# Patient Record
Sex: MALE | Race: WHITE | NOT HISPANIC OR LATINO | Employment: OTHER | URBAN - METROPOLITAN AREA
[De-identification: names, ages, dates, MRNs, and addresses within clinical notes are randomized per-mention and may not be internally consistent; named-entity substitution may affect disease eponyms.]

---

## 2017-10-04 ENCOUNTER — ALLSCRIPTS OFFICE VISIT (OUTPATIENT)
Dept: OTHER | Facility: OTHER | Age: 67
End: 2017-10-04

## 2017-10-04 DIAGNOSIS — N40.0 ENLARGED PROSTATE WITHOUT LOWER URINARY TRACT SYMPTOMS (LUTS): ICD-10-CM

## 2017-10-04 LAB
BILIRUB UR QL STRIP: NEGATIVE
CLARITY UR: NORMAL
COLOR UR: YELLOW
GLUCOSE (HISTORICAL): NEGATIVE
HGB UR QL STRIP.AUTO: NEGATIVE
KETONES UR STRIP-MCNC: NEGATIVE MG/DL
LEUKOCYTE ESTERASE UR QL STRIP: NORMAL
NITRITE UR QL STRIP: NEGATIVE
PH UR STRIP.AUTO: 6 [PH]
PROT UR STRIP-MCNC: NORMAL MG/DL
SP GR UR STRIP.AUTO: 1.02
UROBILINOGEN UR QL STRIP.AUTO: 0.2

## 2018-01-13 VITALS
BODY MASS INDEX: 30.45 KG/M2 | HEIGHT: 67 IN | SYSTOLIC BLOOD PRESSURE: 144 MMHG | DIASTOLIC BLOOD PRESSURE: 82 MMHG | WEIGHT: 194 LBS

## 2018-10-10 ENCOUNTER — OFFICE VISIT (OUTPATIENT)
Dept: UROLOGY | Facility: MEDICAL CENTER | Age: 68
End: 2018-10-10
Payer: MEDICARE

## 2018-10-10 VITALS
HEIGHT: 64 IN | DIASTOLIC BLOOD PRESSURE: 84 MMHG | BODY MASS INDEX: 31.41 KG/M2 | SYSTOLIC BLOOD PRESSURE: 136 MMHG | WEIGHT: 184 LBS

## 2018-10-10 DIAGNOSIS — N40.1 BENIGN LOCALIZED PROSTATIC HYPERPLASIA WITH LOWER URINARY TRACT SYMPTOMS (LUTS): Primary | ICD-10-CM

## 2018-10-10 LAB
SL AMB  POCT GLUCOSE, UA: NEGATIVE
SL AMB LEUKOCYTE ESTERASE,UA: NEGATIVE
SL AMB POCT BILIRUBIN,UA: NEGATIVE
SL AMB POCT BLOOD,UA: NEGATIVE
SL AMB POCT CLARITY,UA: CLEAR
SL AMB POCT COLOR,UA: YELLOW
SL AMB POCT KETONES,UA: NEGATIVE
SL AMB POCT NITRITE,UA: NEGATIVE
SL AMB POCT PH,UA: 5.5
SL AMB POCT SPECIFIC GRAVITY,UA: 1.02
SL AMB POCT URINE PROTEIN: NORMAL
SL AMB POCT UROBILINOGEN: 0.2

## 2018-10-10 PROCEDURE — 81003 URINALYSIS AUTO W/O SCOPE: CPT | Performed by: UROLOGY

## 2018-10-10 PROCEDURE — 99214 OFFICE O/P EST MOD 30 MIN: CPT | Performed by: UROLOGY

## 2018-10-10 RX ORDER — SOTALOL HYDROCHLORIDE 120 MG/1
TABLET ORAL DAILY
COMMUNITY
Start: 2018-10-03 | End: 2019-12-20 | Stop reason: SDUPTHER

## 2018-10-10 RX ORDER — AMLODIPINE BESYLATE 10 MG/1
TABLET ORAL DAILY
COMMUNITY
Start: 2018-09-01

## 2018-10-10 RX ORDER — POTASSIUM CHLORIDE 20 MEQ/1
TABLET, EXTENDED RELEASE ORAL 2 TIMES DAILY
COMMUNITY
Start: 2018-09-22 | End: 2021-01-04 | Stop reason: ALTCHOICE

## 2018-10-10 RX ORDER — SIMVASTATIN 5 MG
TABLET ORAL DAILY
COMMUNITY
Start: 2018-09-26

## 2018-10-10 RX ORDER — GEMFIBROZIL 600 MG/1
TABLET, FILM COATED ORAL 2 TIMES DAILY
COMMUNITY
Start: 2018-09-22 | End: 2021-01-04 | Stop reason: ALTCHOICE

## 2018-10-10 RX ORDER — SERTRALINE HYDROCHLORIDE 100 MG/1
TABLET, FILM COATED ORAL DAILY
COMMUNITY
Start: 2018-09-24

## 2018-10-10 RX ORDER — RIVAROXABAN 20 MG/1
TABLET, FILM COATED ORAL DAILY
COMMUNITY
Start: 2018-08-09

## 2018-10-10 NOTE — ASSESSMENT & PLAN NOTE
The patient's urinary symptoms are now interfering with his activities of daily living  He will return in the near future for cystoscopy

## 2018-10-10 NOTE — PROGRESS NOTES
Assessment/Plan:    Benign localized prostatic hyperplasia with lower urinary tract symptoms (LUTS)    The patient's urinary symptoms are now interfering with his activities of daily living  He will return in the near future for cystoscopy  Diagnoses and all orders for this visit:    Benign localized prostatic hyperplasia with lower urinary tract symptoms (LUTS)  -     POCT urine dip auto non-scope    Other orders  -     XARELTO 20 MG tablet; daily  -     sotalol (BETAPACE) 120 mg tablet; daily  -     potassium chloride (K-DUR,KLOR-CON) 20 mEq tablet; 2 (two) times a day  -     azilsartan medoxomil (EDARBI) 40 MG tablet; Take 40 mg by mouth daily  -     amLODIPine (NORVASC) 10 mg tablet; daily  -     gemfibrozil (LOPID) 600 mg tablet; 2 (two) times a day  -     metFORMIN (GLUCOPHAGE) 500 mg tablet; Take by mouth daily  -     simvastatin (ZOCOR) 5 MG tablet; daily  -     sertraline (ZOLOFT) 100 mg tablet; daily          Subjective:      Patient ID: Chelo Huerta is a 79 y o  male  HPI    BPH:   He notes urinary frequency, urinary urgency  He denies other significant urinary symptoms  He denies gross hematuria, urinary tract infections or incontinence  He is taking neither medications nor supplements for his symptoms  Sx interfere with ADL's  I think we can make pt much better than he is  AUA SYMPTOM SCORE      Most Recent Value   AUA SYMPTOM SCORE   How often have you had a sensation of not emptying your bladder completely after you finished urinating? 0   How often have you had to urinate again less than two hours after you finished urinating? 5   How often have you found you stopped and started again several times when you urinate?  0   How often have you found it difficult to postpone urination? 3   How often have you had a weak urinary stream?  2   How often have you had to push or strain to begin urination?   0   How many times did you most typically get up to urinate from the time you went to bed at night until the time you got up in the morning? 2   Quality of Life: If you were to spend the rest of your life with your urinary condition just the way it is now, how would you feel about that?  3   AUA SYMPTOM SCORE  12        PSA in April 2018 was 0 72  The following portions of the patient's history were reviewed and updated as appropriate: allergies, current medications, past family history, past medical history, past social history, past surgical history and problem list     Review of Systems   Constitutional: Negative for activity change and fatigue  Respiratory: Negative for shortness of breath and wheezing  Cardiovascular: Negative for chest pain  The patient had a successful cardioversion in the past   He is taking Xarelto  Still in and out of a fib  Had ablation a few days ago  Hypertension  Gastrointestinal: Negative for abdominal pain  Endocrine:          Non-insulin-dependent diabetes  Genitourinary: Negative for difficulty urinating, dysuria, frequency, hematuria and urgency  Musculoskeletal: Negative for back pain and gait problem  Skin: Negative  Allergic/Immunologic: Negative  Neurological: Negative  Psychiatric/Behavioral: Negative  Objective:      /84 (BP Location: Left arm, Patient Position: Sitting, Cuff Size: Standard)   Ht 5' 4" (1 626 m)   Wt 83 5 kg (184 lb)   BMI 31 58 kg/m²          Physical Exam   Constitutional: He is oriented to person, place, and time  He appears well-developed and well-nourished  HENT:   Head: Normocephalic and atraumatic  Eyes: EOM are normal    Neck: Normal range of motion  Neck supple  Pulmonary/Chest: Effort normal    Genitourinary: Rectum normal    Genitourinary Comments: The prostate is 40 grams, firm, smooth and non-tender   Musculoskeletal: Normal range of motion  Neurological: He is alert and oriented to person, place, and time  Skin: Skin is warm and dry     Psychiatric: He has a normal mood and affect   His behavior is normal  Judgment and thought content normal

## 2018-10-10 NOTE — LETTER
October 10, 2018     Nazia Cloud MD  [id number] [place] [address]    Patient: Milind Bonilla   YOB: 1950   Date of Visit: 10/10/2018       Dear Dr Joshua Aquino:    Thank you for referring Christine Vang to me for evaluation  Below are my notes for this consultation  If you have questions, please do not hesitate to call me  I look forward to following your patient along with you  Sincerely,        Hernán De Guzman MD        CC: No Recipients  Hernán De Guzman MD  10/10/2018  9:56 AM  Sign at close encounter  Assessment/Plan:    Benign localized prostatic hyperplasia with lower urinary tract symptoms (LUTS)    The patient's urinary symptoms are now interfering with his activities of daily living  He will return in the near future for cystoscopy  Diagnoses and all orders for this visit:    Benign localized prostatic hyperplasia with lower urinary tract symptoms (LUTS)  -     POCT urine dip auto non-scope    Other orders  -     XARELTO 20 MG tablet; daily  -     sotalol (BETAPACE) 120 mg tablet; daily  -     potassium chloride (K-DUR,KLOR-CON) 20 mEq tablet; 2 (two) times a day  -     azilsartan medoxomil (EDARBI) 40 MG tablet; Take 40 mg by mouth daily  -     amLODIPine (NORVASC) 10 mg tablet; daily  -     gemfibrozil (LOPID) 600 mg tablet; 2 (two) times a day  -     metFORMIN (GLUCOPHAGE) 500 mg tablet; Take by mouth daily  -     simvastatin (ZOCOR) 5 MG tablet; daily  -     sertraline (ZOLOFT) 100 mg tablet; daily          Subjective:      Patient ID: Milind Bonilla is a 79 y o  male  HPI    BPH:   He notes urinary frequency, urinary urgency  He denies other significant urinary symptoms  He denies gross hematuria, urinary tract infections or incontinence  He is taking neither medications nor supplements for his symptoms  Sx interfere with ADL's  I think we can make pt much better than he is         AUA SYMPTOM SCORE      Most Recent Value   AUA SYMPTOM SCORE How often have you had a sensation of not emptying your bladder completely after you finished urinating? 0   How often have you had to urinate again less than two hours after you finished urinating? 5   How often have you found you stopped and started again several times when you urinate?  0   How often have you found it difficult to postpone urination? 3   How often have you had a weak urinary stream?  2   How often have you had to push or strain to begin urination? 0   How many times did you most typically get up to urinate from the time you went to bed at night until the time you got up in the morning? 2   Quality of Life: If you were to spend the rest of your life with your urinary condition just the way it is now, how would you feel about that?  3   AUA SYMPTOM SCORE  12        PSA in April 2018 was 0 72  The following portions of the patient's history were reviewed and updated as appropriate: allergies, current medications, past family history, past medical history, past social history, past surgical history and problem list     Review of Systems   Constitutional: Negative for activity change and fatigue  Respiratory: Negative for shortness of breath and wheezing  Cardiovascular: Negative for chest pain  The patient had a successful cardioversion in the past   He is taking Xarelto  Still in and out of a fib  Had ablation a few days ago  Hypertension  Gastrointestinal: Negative for abdominal pain  Endocrine:          Non-insulin-dependent diabetes  Genitourinary: Negative for difficulty urinating, dysuria, frequency, hematuria and urgency  Musculoskeletal: Negative for back pain and gait problem  Skin: Negative  Allergic/Immunologic: Negative  Neurological: Negative  Psychiatric/Behavioral: Negative            Objective:      /84 (BP Location: Left arm, Patient Position: Sitting, Cuff Size: Standard)   Ht 5' 4" (1 626 m)   Wt 83 5 kg (184 lb)   BMI 31 58 kg/m² Physical Exam   Constitutional: He is oriented to person, place, and time  He appears well-developed and well-nourished  HENT:   Head: Normocephalic and atraumatic  Eyes: EOM are normal    Neck: Normal range of motion  Neck supple  Pulmonary/Chest: Effort normal    Genitourinary: Rectum normal    Genitourinary Comments: The prostate is 40 grams, firm, smooth and non-tender   Musculoskeletal: Normal range of motion  Neurological: He is alert and oriented to person, place, and time  Skin: Skin is warm and dry  Psychiatric: He has a normal mood and affect   His behavior is normal  Judgment and thought content normal

## 2018-10-29 ENCOUNTER — PROCEDURE VISIT (OUTPATIENT)
Dept: UROLOGY | Facility: MEDICAL CENTER | Age: 68
End: 2018-10-29
Payer: MEDICARE

## 2018-10-29 VITALS
DIASTOLIC BLOOD PRESSURE: 84 MMHG | HEIGHT: 64 IN | SYSTOLIC BLOOD PRESSURE: 146 MMHG | BODY MASS INDEX: 31.41 KG/M2 | WEIGHT: 184 LBS

## 2018-10-29 DIAGNOSIS — N32.81 OVERACTIVE BLADDER: ICD-10-CM

## 2018-10-29 DIAGNOSIS — N13.8 BPH WITH URINARY OBSTRUCTION: ICD-10-CM

## 2018-10-29 DIAGNOSIS — N40.1 BPH WITH URINARY OBSTRUCTION: ICD-10-CM

## 2018-10-29 DIAGNOSIS — N40.1 BENIGN LOCALIZED PROSTATIC HYPERPLASIA WITH LOWER URINARY TRACT SYMPTOMS (LUTS): Primary | ICD-10-CM

## 2018-10-29 LAB
SL AMB  POCT GLUCOSE, UA: ABNORMAL
SL AMB LEUKOCYTE ESTERASE,UA: ABNORMAL
SL AMB POCT BILIRUBIN,UA: NEGATIVE
SL AMB POCT BLOOD,UA: ABNORMAL
SL AMB POCT CLARITY,UA: CLEAR
SL AMB POCT COLOR,UA: ABNORMAL
SL AMB POCT KETONES,UA: NEGATIVE
SL AMB POCT NITRITE,UA: NEGATIVE
SL AMB POCT PH,UA: 6
SL AMB POCT SPECIFIC GRAVITY,UA: 1.02
SL AMB POCT URINE PROTEIN: ABNORMAL
SL AMB POCT UROBILINOGEN: 0.2

## 2018-10-29 PROCEDURE — 99213 OFFICE O/P EST LOW 20 MIN: CPT | Performed by: UROLOGY

## 2018-10-29 PROCEDURE — 52000 CYSTOURETHROSCOPY: CPT | Performed by: UROLOGY

## 2018-10-29 PROCEDURE — 81003 URINALYSIS AUTO W/O SCOPE: CPT | Performed by: UROLOGY

## 2018-10-29 RX ORDER — TAMSULOSIN HYDROCHLORIDE 0.4 MG/1
0.4 CAPSULE ORAL EVERY EVENING
Qty: 30 CAPSULE | Refills: 1 | Status: SHIPPED | OUTPATIENT
Start: 2018-10-29 | End: 2018-11-28 | Stop reason: SDUPTHER

## 2018-10-29 RX ORDER — NITROFURANTOIN 25; 75 MG/1; MG/1
100 CAPSULE ORAL 2 TIMES DAILY
Qty: 6 CAPSULE | Refills: 0 | Status: SHIPPED | OUTPATIENT
Start: 2018-10-29 | End: 2021-01-04 | Stop reason: ALTCHOICE

## 2018-10-29 NOTE — ASSESSMENT & PLAN NOTE
The patient's urinary frequency and urgency certainly overlap with this diagnosis and the diagnosis of BPH  We will try alpha blockers and reassess the patient in a month

## 2018-10-29 NOTE — LETTER
October 29, 2018     Carlton Morales MD  [address] [facility]  [address] [address]    Patient: Harjit Saldaña   YOB: 1950   Date of Visit: 10/29/2018       Dear Dr Dinesh Bhatia:    Thank you for referring More Alcantara to me for evaluation  Below are my notes for this consultation  If you have questions, please do not hesitate to call me  I look forward to following your patient along with you  Sincerely,        Dimitri Buchanan MD        CC: No Recipients  Dimitri Buchanan MD  10/29/2018  8:49 AM  Sign at close encounter  Assessment/Plan:    Overactive bladder  The patient's urinary frequency and urgency certainly overlap with this diagnosis and the diagnosis of BPH  We will try alpha blockers and reassess the patient in a month  Benign localized prostatic hyperplasia with lower urinary tract symptoms (LUTS)    Trial of alpha blockers and reassess in 4 weeks  Diagnoses and all orders for this visit:    Benign localized prostatic hyperplasia with lower urinary tract symptoms (LUTS)  -     POCT urine dip auto non-scope  -     nitrofurantoin (MACROBID) 100 mg capsule; Take 1 capsule (100 mg total) by mouth 2 (two) times a day    BPH with urinary obstruction  -     tamsulosin (FLOMAX) 0 4 mg; Take 1 capsule (0 4 mg total) by mouth every evening    Overactive bladder          Subjective:      Patient ID: Harjit Saldaña is a 79 y o  male  HPI  BPH:   He notes urinary frequency, urinary urgency  He denies other significant urinary symptoms  He denies gross hematuria, urinary tract infections or incontinence  He is taking neither medications nor supplements for his symptoms       Sx interfere with ADL's  I think we can make pt much better than he is             AUA SYMPTOM SCORE      Most Recent Value   AUA SYMPTOM SCORE   How often have you had a sensation of not emptying your bladder completely after you finished urinating?   0   How often have you had to urinate again less than two hours after you finished urinating? 5   How often have you found you stopped and started again several times when you urinate?  0   How often have you found it difficult to postpone urination? 3   How often have you had a weak urinary stream?  2   How often have you had to push or strain to begin urination? 0   How many times did you most typically get up to urinate from the time you went to bed at night until the time you got up in the morning? 2   Quality of Life: If you were to spend the rest of your life with your urinary condition just the way it is now, how would you feel about that?  3   AUA SYMPTOM SCORE  12          PSA in April 2018 was 0 72  Overactive bladder:  The patient's urinary frequency and urgency certainly overlap with this diagnosis and the diagnosis of BPH  We will try alpha blockers and reassess the patient in a month  Procedures  MALE FLEXIBLE CYSTOSCOPY    Preoperative diagnosis:    BPH with obstruction    Postoperative diagnosis:     Same    Operation:  Flexible cystoscopy    Surgeon:  Teresa Espinoza MD,FACS    Anesthesia:  Xylocaine jelly    Procedure:  Patient was brought to the cystoscopy room and positively identified  The patient was prepped and draped in sterile fashion  Ten mL of 1% xylocaine jelly was instilled into the urethra  A penile clamp was applied  The flexible cystoscope was inserted  Findings are as follows:    Penile Urethra:normal  Bulbar Urethra:normal  Prostate:   Occlusive for approximately 1 5 cm by symmetrically enlarged lateral lobes  Bladder: The bladder neck is normal  Ureteral orifices are in normal  position  The mucosa is  normal    There is  mild trabeculation  The cystoscope was removed  The patient tolerated the procedure well  Following additional consultation to review the findings with the patient, he was discharged from the office in satisfactory condition  A small postvoid residual was noted      Impression:  Mild BPH which can be managed with medication if the patient wishes  Alternatively, he may simply live with the symptoms as they are  The following portions of the patient's history were reviewed and updated as appropriate: allergies, current medications, past family history, past medical history, past social history, past surgical history and problem list     Review of Systems    Constitutional: Negative for activity change and fatigue  Respiratory: Negative for shortness of breath and wheezing  Cardiovascular: Negative for chest pain  The patient had a successful cardioversion in the past   He is taking Xarelto  Still in and out of a fib  Had ablation a few days ago  Hypertension  Gastrointestinal: Negative for abdominal pain  Endocrine:          Non-insulin-dependent diabetes  Genitourinary: Negative for difficulty urinating, dysuria, frequency, hematuria and urgency  Musculoskeletal: Negative for back pain and gait problem  Skin: Negative  Allergic/Immunologic: Negative  Neurological: Negative  Psychiatric/Behavioral: Negative  Objective:      /84 (BP Location: Left arm, Patient Position: Sitting, Cuff Size: Standard)   Ht 5' 4" (1 626 m)   Wt 83 5 kg (184 lb)   BMI 31 58 kg/m²           Physical Exam   Constitutional: He is oriented to person, place, and time  He appears well-developed and well-nourished  HENT:   Head: Normocephalic and atraumatic  Eyes: EOM are normal    Neck: Normal range of motion  Pulmonary/Chest: Effort normal    Genitourinary: Penis normal    Genitourinary Comments: Normal external genitalia  Musculoskeletal: Normal range of motion  Neurological: He is alert and oriented to person, place, and time  Skin: Skin is warm and dry  Psychiatric: He has a normal mood and affect   His behavior is normal  Judgment and thought content normal

## 2018-10-29 NOTE — PROGRESS NOTES
Assessment/Plan:    Overactive bladder  The patient's urinary frequency and urgency certainly overlap with this diagnosis and the diagnosis of BPH  We will try alpha blockers and reassess the patient in a month  Benign localized prostatic hyperplasia with lower urinary tract symptoms (LUTS)    Trial of alpha blockers and reassess in 4 weeks  Diagnoses and all orders for this visit:    Benign localized prostatic hyperplasia with lower urinary tract symptoms (LUTS)  -     POCT urine dip auto non-scope  -     nitrofurantoin (MACROBID) 100 mg capsule; Take 1 capsule (100 mg total) by mouth 2 (two) times a day    BPH with urinary obstruction  -     tamsulosin (FLOMAX) 0 4 mg; Take 1 capsule (0 4 mg total) by mouth every evening    Overactive bladder          Subjective:      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 0   How often have you had to urinate again less than two hours after you finished urinating? 5   How often have you found you stopped and started again several times when you urinate?  0   How often have you found it difficult to postpone urination? 3   How often have you had a weak urinary stream?  2   How often have you had to push or strain to begin urination? 0   How many times did you most typically get up to urinate from the time you went to bed at night until the time you got up in the morning?   2   Quality of Life: If you were to spend the rest of your life with your urinary condition just the way it is now, how would you feel about that?  3   AUA SYMPTOM SCORE  12          PSA in April 2018 was 0 72  Overactive bladder:  The patient's urinary frequency and urgency certainly overlap with this diagnosis and the diagnosis of BPH  We will try alpha blockers and reassess the patient in a month  Procedures  MALE FLEXIBLE CYSTOSCOPY    Preoperative diagnosis:    BPH with obstruction    Postoperative diagnosis:     Same    Operation:  Flexible cystoscopy    Surgeon:  Dany Estrada MD,FACS    Anesthesia:  Xylocaine jelly    Procedure:  Patient was brought to the cystoscopy room and positively identified  The patient was prepped and draped in sterile fashion  Ten mL of 1% xylocaine jelly was instilled into the urethra  A penile clamp was applied  The flexible cystoscope was inserted  Findings are as follows:    Penile Urethra:normal  Bulbar Urethra:normal  Prostate:   Occlusive for approximately 1 5 cm by symmetrically enlarged lateral lobes  Bladder: The bladder neck is normal  Ureteral orifices are in normal  position  The mucosa is  normal    There is  mild trabeculation  The cystoscope was removed  The patient tolerated the procedure well  Following additional consultation to review the findings with the patient, he was discharged from the office in satisfactory condition  A small postvoid residual was noted  Impression:  Mild BPH which can be managed with medication if the patient wishes  Alternatively, he may simply live with the symptoms as they are  The following portions of the patient's history were reviewed and updated as appropriate: allergies, current medications, past family history, past medical history, past social history, past surgical history and problem list     Review of Systems    Constitutional: Negative for activity change and fatigue  Respiratory: Negative for shortness of breath and wheezing  Cardiovascular: Negative for chest pain  The patient had a successful cardioversion in the past   He is taking Xarelto  Still in and out of a fib  Had ablation a few days ago  Hypertension  Gastrointestinal: Negative for abdominal pain  Endocrine:          Non-insulin-dependent diabetes  Genitourinary: Negative for difficulty urinating, dysuria, frequency, hematuria and urgency  Musculoskeletal: Negative for back pain and gait problem  Skin: Negative  Allergic/Immunologic: Negative  Neurological: Negative  Psychiatric/Behavioral: Negative  Objective:      /84 (BP Location: Left arm, Patient Position: Sitting, Cuff Size: Standard)   Ht 5' 4" (1 626 m)   Wt 83 5 kg (184 lb)   BMI 31 58 kg/m²          Physical Exam   Constitutional: He is oriented to person, place, and time  He appears well-developed and well-nourished  HENT:   Head: Normocephalic and atraumatic  Eyes: EOM are normal    Neck: Normal range of motion  Pulmonary/Chest: Effort normal    Genitourinary: Penis normal    Genitourinary Comments: Normal external genitalia  Musculoskeletal: Normal range of motion  Neurological: He is alert and oriented to person, place, and time  Skin: Skin is warm and dry  Psychiatric: He has a normal mood and affect   His behavior is normal  Judgment and thought content normal

## 2018-11-28 ENCOUNTER — OFFICE VISIT (OUTPATIENT)
Dept: UROLOGY | Facility: MEDICAL CENTER | Age: 68
End: 2018-11-28
Payer: MEDICARE

## 2018-11-28 VITALS
BODY MASS INDEX: 29.57 KG/M2 | DIASTOLIC BLOOD PRESSURE: 82 MMHG | HEART RATE: 54 BPM | WEIGHT: 184 LBS | SYSTOLIC BLOOD PRESSURE: 142 MMHG | HEIGHT: 66 IN

## 2018-11-28 DIAGNOSIS — R35.0 URINARY FREQUENCY: ICD-10-CM

## 2018-11-28 DIAGNOSIS — N13.8 BPH WITH URINARY OBSTRUCTION: ICD-10-CM

## 2018-11-28 DIAGNOSIS — N40.1 BPH WITH URINARY OBSTRUCTION: ICD-10-CM

## 2018-11-28 DIAGNOSIS — N40.1 BENIGN LOCALIZED PROSTATIC HYPERPLASIA WITH LOWER URINARY TRACT SYMPTOMS (LUTS): Primary | ICD-10-CM

## 2018-11-28 PROBLEM — N32.81 OVERACTIVE BLADDER: Status: RESOLVED | Noted: 2018-10-29 | Resolved: 2018-11-28

## 2018-11-28 LAB
SL AMB  POCT GLUCOSE, UA: NEGATIVE
SL AMB LEUKOCYTE ESTERASE,UA: NEGATIVE
SL AMB POCT BILIRUBIN,UA: NEGATIVE
SL AMB POCT BLOOD,UA: NEGATIVE
SL AMB POCT CLARITY,UA: CLEAR
SL AMB POCT COLOR,UA: YELLOW
SL AMB POCT KETONES,UA: NEGATIVE
SL AMB POCT NITRITE,UA: NEGATIVE
SL AMB POCT PH,UA: 6
SL AMB POCT SPECIFIC GRAVITY,UA: >=1.03
SL AMB POCT URINE PROTEIN: NORMAL
SL AMB POCT UROBILINOGEN: 0.2

## 2018-11-28 PROCEDURE — 99213 OFFICE O/P EST LOW 20 MIN: CPT | Performed by: UROLOGY

## 2018-11-28 PROCEDURE — 81003 URINALYSIS AUTO W/O SCOPE: CPT | Performed by: UROLOGY

## 2018-11-28 RX ORDER — TAMSULOSIN HYDROCHLORIDE 0.4 MG/1
0.4 CAPSULE ORAL EVERY EVENING
Qty: 30 CAPSULE | Refills: 11 | Status: SHIPPED | OUTPATIENT
Start: 2018-11-28 | End: 2019-12-20

## 2018-11-28 RX ORDER — RISPERIDONE 0.5 MG/1
TABLET, FILM COATED ORAL DAILY
COMMUNITY
Start: 2018-11-13 | End: 2021-01-04 | Stop reason: ALTCHOICE

## 2018-11-28 NOTE — LETTER
November 28, 2018     Radha Hill MD  [address] [id number] [address] [address]    Patient: Sherman Ricketts   YOB: 1950   Date of Visit: 11/28/2018       Dear Dr Oswald Ny:    Thank you for referring Urszula Correa to me for evaluation  Below are my notes for this consultation  If you have questions, please do not hesitate to call me  I look forward to following your patient along with you  Sincerely,        Dana Ramirez MD        CC: No Recipients  Dana Ramirez MD  11/28/2018 11:03 AM  Sign at close encounter  Assessment/Plan:    Benign localized prostatic hyperplasia with lower urinary tract symptoms (LUTS)    The patient is doing very well on tamsulosin  We will continue it and see him again in 1 year  Diagnoses and all orders for this visit:    Benign localized prostatic hyperplasia with lower urinary tract symptoms (LUTS)    Urinary frequency  -     POCT urine dip auto non-scope    BPH with urinary obstruction  -     tamsulosin (FLOMAX) 0 4 mg; Take 1 capsule (0 4 mg total) by mouth every evening    Other orders  -     risperiDONE (RisperDAL) 0 5 mg tablet; daily          Subjective:      Patient ID: Sherman Ricketts is a 79 y o  male  HPI  BPH:   The patient was seen approximately 4 weeks ago with urinary symptoms severe enough to interfere with his activities of daily living  He was started on nitrofurantoin and tamsulosin  AUA score significantly improved  He notes nocturia x 1, weak stream   He denies other significant urinary symptoms  He denies gross hematuria, urinary tract infections or incontinence  He is taking tamsulosin for his symptoms  Feel tamsulosin turned his life around  No side effects  AUA SYMPTOM SCORE      Most Recent Value   AUA SYMPTOM SCORE   How often have you had a sensation of not emptying your bladder completely after you finished urinating?   0   How often have you had to urinate again less than two hours after you finished urinating? 2   How often have you found you stopped and started again several times when you urinate?  0   How often have you found it difficult to postpone urination? 2   How often have you had a weak urinary stream?  2   How often have you had to push or strain to begin urination? 0   How many times did you most typically get up to urinate from the time you went to bed at night until the time you got up in the morning? 1   Quality of Life: If you were to spend the rest of your life with your urinary condition just the way it is now, how would you feel about that?  2   AUA SYMPTOM SCORE  7          The following portions of the patient's history were reviewed and updated as appropriate: allergies, current medications, past family history, past medical history, past social history, past surgical history and problem list     Review of Systems    Constitutional: Negative for activity change and fatigue  Respiratory: Negative for shortness of breath and wheezing  Cardiovascular: Negative for chest pain  The patient had a successful cardioversion in the past   He is taking Xarelto  Still in and out of a fib  Had ablation a few days ago  Hypertension  Gastrointestinal: Negative for abdominal pain  Endocrine:          Non-insulin-dependent diabetes  Genitourinary: Negative for difficulty urinating, dysuria, frequency, hematuria and urgency  Musculoskeletal: Negative for back pain and gait problem  Skin: Negative  Allergic/Immunologic: Negative  Neurological: Negative  Psychiatric/Behavioral: Negative  Objective:      /82 (BP Location: Left arm, Patient Position: Sitting, Cuff Size: Standard)   Pulse (!) 54   Ht 5' 6" (1 676 m)   Wt 83 5 kg (184 lb)   BMI 29 70 kg/m²           Physical Exam   Constitutional: He is oriented to person, place, and time  He appears well-developed and well-nourished  HENT:   Head: Normocephalic and atraumatic     Eyes: EOM are normal  Neck: Normal range of motion  Pulmonary/Chest: Effort normal    Musculoskeletal: Normal range of motion  Neurological: He is alert and oriented to person, place, and time  Skin: Skin is warm and dry  Psychiatric: He has a normal mood and affect   His behavior is normal  Judgment and thought content normal

## 2018-11-28 NOTE — PROGRESS NOTES
Assessment/Plan:    Benign localized prostatic hyperplasia with lower urinary tract symptoms (LUTS)    The patient is doing very well on tamsulosin  We will continue it and see him again in 1 year  Diagnoses and all orders for this visit:    Benign localized prostatic hyperplasia with lower urinary tract symptoms (LUTS)    Urinary frequency  -     POCT urine dip auto non-scope    BPH with urinary obstruction  -     tamsulosin (FLOMAX) 0 4 mg; Take 1 capsule (0 4 mg total) by mouth every evening    Other orders  -     risperiDONE (RisperDAL) 0 5 mg tablet; daily          Subjective:      Patient ID: Miguel Kilgore is a 79 y o  male  HPI  BPH:   The patient was seen approximately 4 weeks ago with urinary symptoms severe enough to interfere with his activities of daily living  He was started on nitrofurantoin and tamsulosin  AUA score significantly improved  He notes nocturia x 1, weak stream   He denies other significant urinary symptoms  He denies gross hematuria, urinary tract infections or incontinence  He is taking tamsulosin for his symptoms  Feel tamsulosin turned his life around  No side effects  AUA SYMPTOM SCORE      Most Recent Value   AUA SYMPTOM SCORE   How often have you had a sensation of not emptying your bladder completely after you finished urinating? 0   How often have you had to urinate again less than two hours after you finished urinating? 2   How often have you found you stopped and started again several times when you urinate?  0   How often have you found it difficult to postpone urination? 2   How often have you had a weak urinary stream?  2   How often have you had to push or strain to begin urination? 0   How many times did you most typically get up to urinate from the time you went to bed at night until the time you got up in the morning?   1   Quality of Life: If you were to spend the rest of your life with your urinary condition just the way it is now, how would you feel about that?  2   AUA SYMPTOM SCORE  7          The following portions of the patient's history were reviewed and updated as appropriate: allergies, current medications, past family history, past medical history, past social history, past surgical history and problem list     Review of Systems    Constitutional: Negative for activity change and fatigue  Respiratory: Negative for shortness of breath and wheezing  Cardiovascular: Negative for chest pain  The patient had a successful cardioversion in the past   He is taking Xarelto  Still in and out of a fib  Had ablation a few days ago  Hypertension  Gastrointestinal: Negative for abdominal pain  Endocrine:          Non-insulin-dependent diabetes  Genitourinary: Negative for difficulty urinating, dysuria, frequency, hematuria and urgency  Musculoskeletal: Negative for back pain and gait problem  Skin: Negative  Allergic/Immunologic: Negative  Neurological: Negative  Psychiatric/Behavioral: Negative  Objective:      /82 (BP Location: Left arm, Patient Position: Sitting, Cuff Size: Standard)   Pulse (!) 54   Ht 5' 6" (1 676 m)   Wt 83 5 kg (184 lb)   BMI 29 70 kg/m²          Physical Exam   Constitutional: He is oriented to person, place, and time  He appears well-developed and well-nourished  HENT:   Head: Normocephalic and atraumatic  Eyes: EOM are normal    Neck: Normal range of motion  Pulmonary/Chest: Effort normal    Musculoskeletal: Normal range of motion  Neurological: He is alert and oriented to person, place, and time  Skin: Skin is warm and dry  Psychiatric: He has a normal mood and affect   His behavior is normal  Judgment and thought content normal

## 2019-02-08 ENCOUNTER — OFFICE VISIT (OUTPATIENT)
Dept: UROLOGY | Facility: MEDICAL CENTER | Age: 69
End: 2019-02-08
Payer: MEDICARE

## 2019-02-08 VITALS
BODY MASS INDEX: 29.89 KG/M2 | WEIGHT: 186 LBS | SYSTOLIC BLOOD PRESSURE: 140 MMHG | DIASTOLIC BLOOD PRESSURE: 80 MMHG | HEIGHT: 66 IN | HEART RATE: 60 BPM

## 2019-02-08 DIAGNOSIS — R35.0 FREQUENCY OF URINATION: Primary | ICD-10-CM

## 2019-02-08 DIAGNOSIS — N40.1 BPH WITH OBSTRUCTION/LOWER URINARY TRACT SYMPTOMS: ICD-10-CM

## 2019-02-08 DIAGNOSIS — N13.8 BPH WITH OBSTRUCTION/LOWER URINARY TRACT SYMPTOMS: ICD-10-CM

## 2019-02-08 DIAGNOSIS — N32.81 OVERACTIVE BLADDER: ICD-10-CM

## 2019-02-08 LAB — POST-VOID RESIDUAL VOLUME, ML POC: 89 ML

## 2019-02-08 PROCEDURE — 81003 URINALYSIS AUTO W/O SCOPE: CPT | Performed by: UROLOGY

## 2019-02-08 PROCEDURE — 99213 OFFICE O/P EST LOW 20 MIN: CPT | Performed by: UROLOGY

## 2019-02-08 PROCEDURE — 51798 US URINE CAPACITY MEASURE: CPT | Performed by: UROLOGY

## 2019-02-08 RX ORDER — OXYBUTYNIN CHLORIDE 5 MG/1
5 TABLET ORAL 3 TIMES DAILY
Qty: 90 TABLET | Refills: 1 | Status: SHIPPED | OUTPATIENT
Start: 2019-02-08 | End: 2019-12-20

## 2019-02-08 NOTE — LETTER
February 8, 2019     Radha Hill MD  [address] [address]    Patient: Sherman Ricketts   YOB: 1950   Date of Visit: 2/8/2019       Dear Dr Oswald Ny:    Thank you for referring Urszula Correa to me for evaluation  Below are my notes for this consultation  If you have questions, please do not hesitate to call me  I look forward to following your patient along with you  Sincerely,        Dana Ramirez MD        CC: No Recipients  Dana Ramirez MD  2/8/2019  9:20 AM  Sign at close encounter  Assessment/Plan:    Overactive bladder  After short term success with tamsulosin for treatment of the patient's BPH symptoms, he now presents with frequency, urgency and voiding small amounts  The postvoid residual today of 89 mL is actually a 1 hour postvoid residual   The symptoms now are compatible with overactive bladder  He will be given a trial of oxybutynin in addition to the tamsulosin  He will return in 1 month cirilo [place] Diagnoses and all orders for this visit:    Frequency of urination  -     POCT Measure PVR  -     oxybutynin (DITROPAN) 5 mg tablet; Take 1 tablet (5 mg total) by mouth 3 (three) times a day    Overactive bladder    BPH with obstruction/lower urinary tract symptoms          Subjective:      Patient ID: Sherman Ricketts is a 76 y o  male  HPI  BPH:  He notes urinary frequency and nocturia x 3 or more  He denies other significant urinary symptoms  He denies gross hematuria, urinary tract infections or incontinence  He is taking tamsulosin for his symptoms  Stream is excellent  Sx interfere with ADL's and annoy wife  The patient was placed on tamsulosin on to October 29, 2018  He reported that his symptoms improved significantly on November 28, 2018  Now his symptoms are as bad as they ever were  No change in meds  No cold remedies  PVR=89 cc  This is a 1-hour PVR  PSA:  0 72 on April 24, 2018       AUA SYMPTOM SCORE Most Recent Value   AUA SYMPTOM SCORE   How often have you had a sensation of not emptying your bladder completely after you finished urinating? 5   How often have you had to urinate again less than two hours after you finished urinating? 5   How often have you found you stopped and started again several times when you urinate?  0   How often have you found it difficult to postpone urination? 4   How often have you had a weak urinary stream?  0   How often have you had to push or strain to begin urination? 0   How many times did you most typically get up to urinate from the time you went to bed at night until the time you got up in the morning? 3   Quality of Life: If you were to spend the rest of your life with your urinary condition just the way it is now, how would you feel about that?  3   AUA SYMPTOM SCORE  17        The patient refused to give urine sample, stating that his insurance will not pay for it      The following portions of the patient's history were reviewed and updated as appropriate: allergies, current medications, past family history, past medical history, past social history, past surgical history and problem list     Review of Systems    Constitutional: Negative for activity change and fatigue  Respiratory: Negative for shortness of breath and wheezing     Cardiovascular: Negative for chest pain         The patient had a successful cardioversion in the past   He is taking Xarelto   Still in and out of a fib   Had an ablation a few months ago  Hypertension    Gastrointestinal: Negative for abdominal pain  Endocrine:          Non-insulin-dependent diabetes    Genitourinary: Negative for difficulty urinating, dysuria, frequency, hematuria and urgency  Musculoskeletal: Negative for back pain and gait problem     Skin: Negative     Allergic/Immunologic: Negative     Neurological: Negative     Psychiatric/Behavioral: Negative     Objective:      /80 (BP Location: Left arm, Patient Position: Sitting, Cuff Size: Standard)   Pulse 60   Ht 5' 6" (1 676 m)   Wt 84 4 kg (186 lb)   BMI 30 02 kg/m²           Physical Exam

## 2019-02-08 NOTE — ASSESSMENT & PLAN NOTE
After short term success with tamsulosin for treatment of the patient's BPH symptoms, he now presents with frequency, urgency and voiding small amounts  The postvoid residual today of 89 mL is actually a 1 hour postvoid residual   The symptoms now are compatible with overactive bladder  He will be given a trial of oxybutynin in addition to the tamsulosin  He will return in 1 month cirilo Multani

## 2019-03-05 ENCOUNTER — TELEPHONE (OUTPATIENT)
Dept: UROLOGY | Facility: MEDICAL CENTER | Age: 69
End: 2019-03-05

## 2019-03-05 NOTE — TELEPHONE ENCOUNTER
Patient left voice mail to cancel his appointment for Friday 03/08/2019  Called patient back to see if he would like to reschedule  Left voice mail for patient to call back if he would like to reschedule

## 2019-07-17 ENCOUNTER — APPOINTMENT (EMERGENCY)
Dept: CT IMAGING | Facility: HOSPITAL | Age: 69
End: 2019-07-17
Payer: COMMERCIAL

## 2019-07-17 ENCOUNTER — HOSPITAL ENCOUNTER (EMERGENCY)
Facility: HOSPITAL | Age: 69
Discharge: HOME | End: 2019-07-17
Attending: EMERGENCY MEDICINE | Admitting: EMERGENCY MEDICINE
Payer: COMMERCIAL

## 2019-07-17 ENCOUNTER — APPOINTMENT (EMERGENCY)
Dept: RADIOLOGY | Facility: HOSPITAL | Age: 69
End: 2019-07-17
Payer: COMMERCIAL

## 2019-07-17 VITALS
OXYGEN SATURATION: 96 % | DIASTOLIC BLOOD PRESSURE: 69 MMHG | TEMPERATURE: 98.1 F | HEART RATE: 51 BPM | WEIGHT: 188.27 LBS | BODY MASS INDEX: 30.39 KG/M2 | RESPIRATION RATE: 16 BRPM | SYSTOLIC BLOOD PRESSURE: 146 MMHG

## 2019-07-17 DIAGNOSIS — T14.8XXA HEMATOMA: Primary | ICD-10-CM

## 2019-07-17 DIAGNOSIS — S80.12XA CONTUSION OF LEFT LOWER LEG, INITIAL ENCOUNTER: ICD-10-CM

## 2019-07-17 LAB
ANION GAP SERPL CALCULATED.3IONS-SCNC: 10 MMOL/L (ref 4–13)
APTT PPP: 35 SECONDS (ref 23–37)
BASOPHILS # BLD AUTO: 0.08 THOUSANDS/ΜL (ref 0–0.1)
BASOPHILS NFR BLD AUTO: 1 % (ref 0–1)
BUN SERPL-MCNC: 27 MG/DL (ref 5–25)
CALCIUM SERPL-MCNC: 9.9 MG/DL (ref 8.3–10.1)
CHLORIDE SERPL-SCNC: 105 MMOL/L (ref 100–108)
CO2 SERPL-SCNC: 24 MMOL/L (ref 21–32)
CREAT SERPL-MCNC: 1.26 MG/DL (ref 0.6–1.3)
EOSINOPHIL # BLD AUTO: 0.36 THOUSAND/ΜL (ref 0–0.61)
EOSINOPHIL NFR BLD AUTO: 5 % (ref 0–6)
ERYTHROCYTE [DISTWIDTH] IN BLOOD BY AUTOMATED COUNT: 12.8 % (ref 11.6–15.1)
GFR SERPL CREATININE-BSD FRML MDRD: 58 ML/MIN/1.73SQ M
GLUCOSE SERPL-MCNC: 127 MG/DL (ref 65–140)
HCT VFR BLD AUTO: 39.7 % (ref 36.5–49.3)
HGB BLD-MCNC: 13.4 G/DL (ref 12–17)
IMM GRANULOCYTES # BLD AUTO: 0.04 THOUSAND/UL (ref 0–0.2)
IMM GRANULOCYTES NFR BLD AUTO: 1 % (ref 0–2)
INR PPP: 1.15 (ref 0.84–1.19)
LYMPHOCYTES # BLD AUTO: 1.22 THOUSANDS/ΜL (ref 0.6–4.47)
LYMPHOCYTES NFR BLD AUTO: 15 % (ref 14–44)
MCH RBC QN AUTO: 29.6 PG (ref 26.8–34.3)
MCHC RBC AUTO-ENTMCNC: 33.8 G/DL (ref 31.4–37.4)
MCV RBC AUTO: 88 FL (ref 82–98)
MONOCYTES # BLD AUTO: 0.53 THOUSAND/ΜL (ref 0.17–1.22)
MONOCYTES NFR BLD AUTO: 7 % (ref 4–12)
NEUTROPHILS # BLD AUTO: 5.84 THOUSANDS/ΜL (ref 1.85–7.62)
NEUTS SEG NFR BLD AUTO: 71 % (ref 43–75)
NRBC BLD AUTO-RTO: 0 /100 WBCS
PLATELET # BLD AUTO: 320 THOUSANDS/UL (ref 149–390)
PMV BLD AUTO: 9 FL (ref 8.9–12.7)
POTASSIUM SERPL-SCNC: 4.2 MMOL/L (ref 3.5–5.3)
PROTHROMBIN TIME: 14.9 SECONDS (ref 11.6–14.5)
RBC # BLD AUTO: 4.52 MILLION/UL (ref 3.88–5.62)
SODIUM SERPL-SCNC: 139 MMOL/L (ref 136–145)
WBC # BLD AUTO: 8.07 THOUSAND/UL (ref 4.31–10.16)

## 2019-07-17 PROCEDURE — 36415 COLL VENOUS BLD VENIPUNCTURE: CPT | Performed by: PHYSICIAN ASSISTANT

## 2019-07-17 PROCEDURE — 80048 BASIC METABOLIC PNL TOTAL CA: CPT | Performed by: PHYSICIAN ASSISTANT

## 2019-07-17 PROCEDURE — 73701 CT LOWER EXTREMITY W/DYE: CPT

## 2019-07-17 PROCEDURE — 99285 EMERGENCY DEPT VISIT HI MDM: CPT | Performed by: PHYSICIAN ASSISTANT

## 2019-07-17 PROCEDURE — 99284 EMERGENCY DEPT VISIT MOD MDM: CPT

## 2019-07-17 PROCEDURE — 73590 X-RAY EXAM OF LOWER LEG: CPT

## 2019-07-17 PROCEDURE — 85730 THROMBOPLASTIN TIME PARTIAL: CPT | Performed by: PHYSICIAN ASSISTANT

## 2019-07-17 PROCEDURE — 90715 TDAP VACCINE 7 YRS/> IM: CPT | Performed by: PHYSICIAN ASSISTANT

## 2019-07-17 PROCEDURE — 90471 IMMUNIZATION ADMIN: CPT

## 2019-07-17 PROCEDURE — 85610 PROTHROMBIN TIME: CPT | Performed by: PHYSICIAN ASSISTANT

## 2019-07-17 PROCEDURE — 93005 ELECTROCARDIOGRAM TRACING: CPT

## 2019-07-17 PROCEDURE — 85025 COMPLETE CBC W/AUTO DIFF WBC: CPT | Performed by: PHYSICIAN ASSISTANT

## 2019-07-17 PROCEDURE — 96374 THER/PROPH/DIAG INJ IV PUSH: CPT

## 2019-07-17 RX ORDER — MORPHINE SULFATE 4 MG/ML
4 INJECTION, SOLUTION INTRAMUSCULAR; INTRAVENOUS ONCE
Status: COMPLETED | OUTPATIENT
Start: 2019-07-17 | End: 2019-07-17

## 2019-07-17 RX ORDER — HYDROCODONE BITARTRATE AND ACETAMINOPHEN 5; 325 MG/1; MG/1
1 TABLET ORAL EVERY 6 HOURS PRN
Qty: 4 TABLET | Refills: 0 | Status: SHIPPED | OUTPATIENT
Start: 2019-07-17 | End: 2019-07-27

## 2019-07-17 RX ORDER — BACITRACIN, NEOMYCIN, POLYMYXIN B 400; 3.5; 5 [USP'U]/G; MG/G; [USP'U]/G
1 OINTMENT TOPICAL ONCE
Status: COMPLETED | OUTPATIENT
Start: 2019-07-17 | End: 2019-07-17

## 2019-07-17 RX ORDER — ACETAMINOPHEN 325 MG/1
650 TABLET ORAL ONCE AS NEEDED
Status: COMPLETED | OUTPATIENT
Start: 2019-07-17 | End: 2019-07-17

## 2019-07-17 RX ADMIN — BACITRACIN ZINC, NEOMYCIN SULFATE, AND POLYMYXIN B SULFATE 1 SMALL APPLICATION: 400; 3.5; 5 OINTMENT TOPICAL at 16:35

## 2019-07-17 RX ADMIN — MORPHINE SULFATE 4 MG: 4 INJECTION INTRAVENOUS at 12:49

## 2019-07-17 RX ADMIN — TETANUS TOXOID, REDUCED DIPHTHERIA TOXOID AND ACELLULAR PERTUSSIS VACCINE, ADSORBED 0.5 ML: 5; 2.5; 8; 8; 2.5 SUSPENSION INTRAMUSCULAR at 12:18

## 2019-07-17 RX ADMIN — IOHEXOL 100 ML: 350 INJECTION, SOLUTION INTRAVENOUS at 14:04

## 2019-07-17 RX ADMIN — ACETAMINOPHEN 650 MG: 325 TABLET ORAL at 12:16

## 2019-07-17 NOTE — DISCHARGE INSTRUCTIONS
Hold your Xarelto 1 day  FU with your family doctor tomorrow  Return to the ED for any concerns  Including worsening swelling, severe pain, numbness or tingling or reduced pulses

## 2019-07-17 NOTE — ED NOTES
Wound cleansed, neosporin and non-adherent dressing applied, and ace wrap applied per PA request      Alayna Smith RN  07/17/19 6985
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
## 2019-07-17 NOTE — ED NOTES
Larisa Garrison made aware of patient's heart rate, patient remains asymptomatic at this time        Nile Casanova RN  07/17/19 1024

## 2019-07-18 ENCOUNTER — OFFICE VISIT (OUTPATIENT)
Dept: URGENT CARE | Age: 69
End: 2019-07-18
Payer: COMMERCIAL

## 2019-07-18 VITALS
SYSTOLIC BLOOD PRESSURE: 161 MMHG | OXYGEN SATURATION: 96 % | TEMPERATURE: 97.6 F | DIASTOLIC BLOOD PRESSURE: 73 MMHG | BODY MASS INDEX: 30.53 KG/M2 | HEART RATE: 58 BPM | WEIGHT: 190 LBS | HEIGHT: 66 IN | RESPIRATION RATE: 16 BRPM

## 2019-07-18 DIAGNOSIS — S80.12XA TRAUMATIC HEMATOMA OF LEFT LOWER LEG, INITIAL ENCOUNTER: Primary | ICD-10-CM

## 2019-07-18 PROCEDURE — G0382 LEV 3 HOSP TYPE B ED VISIT: HCPCS | Performed by: FAMILY MEDICINE

## 2019-07-18 NOTE — PATIENT INSTRUCTIONS
Hematoma   WHAT YOU NEED TO KNOW:   A hematoma is a collection of blood  A bruise is a type of hematoma  A hematoma may form in a muscle or in the tissues just under the skin  A hematoma that forms under the skin will feel like a bump or hard mass  Hematomas can happen anywhere in your body, including in your brain  Your body may break down and absorb a mild hematoma on its own  A more serious hematoma may need treatment  DISCHARGE INSTRUCTIONS:   Medicines: You may need any of the following:  · Prescription pain medicine  may be given  Ask how to take this medicine safely  · NSAIDs , such as ibuprofen, help decrease swelling, pain, and fever  This medicine is available with or without a doctor's order  NSAIDs can cause stomach bleeding or kidney problems in certain people  If you take blood thinner medicine, always ask your healthcare provider if NSAIDs are safe for you  Always read the medicine label and follow directions  · Antibiotics  prevent or treat a bacterial infection  · Take your medicine as directed  Contact your healthcare provider if you think your medicine is not helping or if you have side effects  Tell him of her if you are allergic to any medicine  Keep a list of the medicines, vitamins, and herbs you take  Include the amounts, and when and why you take them  Bring the list or the pill bottles to follow-up visits  Carry your medicine list with you in case of an emergency  Return to the emergency department if:   · You have new or worsening pain, or pain that does not get better with medicine  · You have a fever  · You have trouble moving the body part that has the hematoma  Contact your healthcare provider if:   · You have questions or concerns about your condition or care  Follow up with your healthcare provider as directed: You may need to have surgery if your hematoma is severe  You may also need other tests to make sure there is no other damage that needs to be treated  Write down your questions so you remember to ask them during your visits  Self-care:   · Rest the area  Rest will help your body heal and will also help prevent more damage  · Apply ice as directed  Ice helps reduce swelling  Ice may also help prevent tissue damage  Use an ice pack, or put crushed ice in a bag  Cover it with a towel  Place it on your hematoma for 20 minutes every hour, or as directed  Ask how many times each day to apply ice, and for how many days  · Compress the injury if possible  Lightly wrap the injury with an elastic or soft bandage  This may help control swelling  Ask your healthcare provider how to wrap your injury properly  · Elevate the area as directed  If possible, raise the area above the level of your heart as often as you can  This will help decrease swelling  · Keep the hematoma covered with a bandage  This will help protect the area while it heals  © 2017 [employer]  Information is for End User's use only and may not be sold, redistributed or otherwise used for commercial purposes  All illustrations and images included in CareNotes® are the copyrighted property of [employer]  or Artie Wang  The above information is an  only  It is not intended as medical advice for individual conditions or treatments  Talk to your doctor, nurse or pharmacist before following any medical regimen to see if it is safe and effective for you
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
## 2019-07-19 LAB
ATRIAL RATE: 46 BPM
P AXIS: 94 DEGREES
PR INTERVAL: 184 MS
QRS AXIS: 6 DEGREES
QRSD INTERVAL: 96 MS
QT INTERVAL: 482 MS
QTC INTERVAL: 421 MS
T WAVE AXIS: 44 DEGREES
VENTRICULAR RATE: 46 BPM

## 2019-07-19 PROCEDURE — 93010 ELECTROCARDIOGRAM REPORT: CPT | Performed by: INTERNAL MEDICINE

## 2019-12-20 ENCOUNTER — OFFICE VISIT (OUTPATIENT)
Dept: UROLOGY | Facility: MEDICAL CENTER | Age: 69
End: 2019-12-20
Payer: MEDICARE

## 2019-12-20 VITALS
WEIGHT: 190.2 LBS | HEART RATE: 74 BPM | SYSTOLIC BLOOD PRESSURE: 138 MMHG | DIASTOLIC BLOOD PRESSURE: 72 MMHG | HEIGHT: 66 IN | BODY MASS INDEX: 30.57 KG/M2

## 2019-12-20 DIAGNOSIS — N13.8 BPH WITH OBSTRUCTION/LOWER URINARY TRACT SYMPTOMS: Primary | ICD-10-CM

## 2019-12-20 DIAGNOSIS — N40.1 BPH WITH OBSTRUCTION/LOWER URINARY TRACT SYMPTOMS: Primary | ICD-10-CM

## 2019-12-20 PROCEDURE — 81003 URINALYSIS AUTO W/O SCOPE: CPT | Performed by: UROLOGY

## 2019-12-20 PROCEDURE — 99214 OFFICE O/P EST MOD 30 MIN: CPT | Performed by: UROLOGY

## 2019-12-20 RX ORDER — SPIRONOLACTONE 50 MG/1
50 TABLET, FILM COATED ORAL 2 TIMES DAILY
Refills: 11 | COMMUNITY
Start: 2019-12-04

## 2019-12-20 RX ORDER — CHLORAL HYDRATE 500 MG
CAPSULE ORAL
COMMUNITY

## 2019-12-20 RX ORDER — CLONAZEPAM 0.5 MG/1
TABLET ORAL
Refills: 2 | COMMUNITY
Start: 2019-09-26 | End: 2021-01-04 | Stop reason: ALTCHOICE

## 2019-12-20 RX ORDER — SIMVASTATIN 10 MG
TABLET ORAL
COMMUNITY
End: 2021-01-04 | Stop reason: ALTCHOICE

## 2019-12-20 RX ORDER — SOTALOL HYDROCHLORIDE 120 MG/1
TABLET ORAL
COMMUNITY
Start: 2019-06-19

## 2019-12-20 NOTE — PROGRESS NOTES
Assessment/Plan:    Benign localized prostatic hyperplasia with lower urinary tract symptoms (LUTS)  Mildly symptomatic  Content with the status quo  PSA ordered  Return in 1 year  Diagnoses and all orders for this visit:    BPH with obstruction/lower urinary tract symptoms  -     PSA, Total Screen; Future  -     PSA, Total Screen; Future    Other orders  -     sotalol (BETAPACE) 120 mg tablet; sotalol 120 mg tablet  -     clonazePAM (KlonoPIN) 0 5 mg tablet; TAKE 1 2 TABLET BY MOUTH TWICE A DAY AND TAKE 1 TABLET AT BEDTIME  -     spironolactone (ALDACTONE) 50 mg tablet; Take 50 mg by mouth 2 (two) times a day  -     simvastatin (ZOCOR) 10 mg tablet; simvastatin 10 mg tablet  -     Omega-3 Fatty Acids (OMEGA-3 FISH OIL) 1000 MG CAPS; Take by mouth          Subjective:      Patient ID: Ana Meléndez is a 71 y o  male  HPI  BPH:  He notes nocturia x 2  Falls right back to sleep  He denies other significant urinary symptoms  He denies gross hematuria, urinary tract infections or incontinence  He is taking neither prescription meds nor supplements for his symptoms  PSA:  Due for PSA  AUA SYMPTOM SCORE      Most Recent Value   AUA SYMPTOM SCORE   How often have you had a sensation of not emptying your bladder completely after you finished urinating? 0   How often have you had to urinate again less than two hours after you finished urinating? 1   How often have you found you stopped and started again several times when you urinate?  0   How often have you found it difficult to postpone urination? 0   How often have you had a weak urinary stream?  0   How often have you had to push or strain to begin urination? 0   How many times did you most typically get up to urinate from the time you went to bed at night until the time you got up in the morning? 2   Quality of Life: If you were to spend the rest of your life with your urinary condition just the way it is now, how would you feel about that? 1   AUA SYMPTOM SCORE  3          The following portions of the patient's history were reviewed and updated as appropriate: allergies, current medications, past family history, past medical history, past social history, past surgical history and problem list     Review of Systems    Constitutional: Negative for activity change and fatigue  Respiratory: Negative for shortness of breath and wheezing     Cardiovascular: Negative for chest pain         The patient had a successful cardioversion in the past   He is taking Xarelto   Still in and out of a fib   Had an ablation a few months ago  Hypertension    Gastrointestinal: Negative for abdominal pain  Endocrine:          Non-insulin-dependent diabetes    Genitourinary: Negative for difficulty urinating, dysuria, frequency, hematuria and urgency  Musculoskeletal: Negative for back pain and gait problem  Skin: Negative     Allergic/Immunologic: Negative     Neurological: Negative     Psychiatric/Behavioral: Negative       Objective:      /72   Pulse 74   Ht 5' 6" (1 676 m)   Wt 86 3 kg (190 lb 3 2 oz)   BMI 30 70 kg/m²          Physical Exam   Constitutional: He is oriented to person, place, and time  He appears well-developed and well-nourished  HENT:   Head: Normocephalic and atraumatic  Eyes: EOM are normal    Neck: Normal range of motion  Neck supple  Pulmonary/Chest: Effort normal    Genitourinary: Rectum normal    Genitourinary Comments: The prostate is 40 grams, firm, smooth and non-tender   Musculoskeletal: Normal range of motion  Neurological: He is alert and oriented to person, place, and time  Skin: Skin is warm and dry  Psychiatric: He has a normal mood and affect   His behavior is normal  Judgment and thought content normal

## 2020-01-02 DIAGNOSIS — N40.1 BENIGN LOCALIZED PROSTATIC HYPERPLASIA WITH LOWER URINARY TRACT SYMPTOMS (LUTS): Primary | ICD-10-CM

## 2020-01-06 ENCOUNTER — TELEPHONE (OUTPATIENT)
Dept: UROLOGY | Facility: MEDICAL CENTER | Age: 70
End: 2020-01-06

## 2020-01-06 DIAGNOSIS — N40.1 BPH WITH OBSTRUCTION/LOWER URINARY TRACT SYMPTOMS: Primary | ICD-10-CM

## 2020-01-06 DIAGNOSIS — N13.8 BPH WITH OBSTRUCTION/LOWER URINARY TRACT SYMPTOMS: Primary | ICD-10-CM

## 2020-01-06 NOTE — TELEPHONE ENCOUNTER
Call placed to patient to inform him that new order for PSA was placed in the system  Patient requested a script be sent to his home  Will mail script to address on file  Patient is aware and understands

## 2020-01-06 NOTE — TELEPHONE ENCOUNTER
Patient of Dr Chavez Score seen in the [facility] office  Patient advised that he went to get his PSA done with the order from 01/02/2020 and advised that medicare will not cover it and he would have to pay $104 00  Patient advised that he needs a different diagnosis code  Please advise

## 2020-01-15 LAB
CREAT ?TM UR-SCNC: 150 UMOL/L
EXT PROTEIN URINE: 24
HBA1C MFR BLD HPLC: 6.5 %
PROT/CREAT UR: 0.16 MG/G{CREAT}

## 2020-01-15 NOTE — TELEPHONE ENCOUNTER
Patient states he received second script for his psa however his insurance still will not cover    Please advise

## 2020-01-15 NOTE — TELEPHONE ENCOUNTER
Patient called and said [facility] in [facility] was putting the wrong code in   They entered the right code and he had his blood work done

## 2020-01-22 ENCOUNTER — TELEPHONE (OUTPATIENT)
Dept: UROLOGY | Facility: CLINIC | Age: 70
End: 2020-01-22

## 2020-01-22 NOTE — TELEPHONE ENCOUNTER
Called and informed patient of test results patient had no additional questions and will follow up as planned   ----- Message from Karen Hernandez MD sent at 1/21/2020  7:56 PM EST -----  PSA nl  Inform pt

## 2020-01-28 ENCOUNTER — OFFICE VISIT (OUTPATIENT)
Dept: URGENT CARE | Facility: CLINIC | Age: 70
End: 2020-01-28
Payer: MEDICARE

## 2020-01-28 VITALS
HEART RATE: 52 BPM | RESPIRATION RATE: 18 BRPM | HEIGHT: 66 IN | BODY MASS INDEX: 31.18 KG/M2 | DIASTOLIC BLOOD PRESSURE: 72 MMHG | WEIGHT: 194 LBS | SYSTOLIC BLOOD PRESSURE: 132 MMHG | OXYGEN SATURATION: 98 % | TEMPERATURE: 98.3 F

## 2020-01-28 DIAGNOSIS — J06.9 UPPER RESPIRATORY INFECTION WITH COUGH AND CONGESTION: Primary | ICD-10-CM

## 2020-01-28 PROCEDURE — G0463 HOSPITAL OUTPT CLINIC VISIT: HCPCS | Performed by: NURSE PRACTITIONER

## 2020-01-28 PROCEDURE — 99213 OFFICE O/P EST LOW 20 MIN: CPT | Performed by: NURSE PRACTITIONER

## 2020-01-28 RX ORDER — PREDNISONE 10 MG/1
10 TABLET ORAL 2 TIMES DAILY WITH MEALS
Qty: 6 TABLET | Refills: 0 | Status: SHIPPED | OUTPATIENT
Start: 2020-01-28 | End: 2020-01-31

## 2020-01-28 NOTE — PATIENT INSTRUCTIONS

## 2020-01-28 NOTE — PROGRESS NOTES
[address] [address]        NAME: Roxanne Palm is a 71 y o  male  : 1950    MRN: [id number]  DATE: 2020  TIME: 12:24 PM    Assessment and Plan   Upper respiratory infection with cough and congestion [J06 9]  1  Upper respiratory infection with cough and congestion  predniSONE 10 mg tablet         Patient Instructions     Prednisone for cough and possible laryngitis  Coricidin OTC as directed  Follow up with PCP in 3-5 days  Proceed to  ER if symptoms worsen  Chief Complaint     Chief Complaint   Patient presents with   Adonna Sale Like Symptoms     Sx began Thursday; worsened over /e but feels as if they are improving  Most bothersome sx is "feeling washed out "         History of Present Illness       HPI   Reports cold symptoms for 4-5 days  Reports that the cough got worse over the weekend and he lost his voice  Also reports fatigue and generally just feeling bad  Review of Systems   Review of Systems   Constitutional: Positive for fatigue  Negative for fever  HENT: Positive for congestion, rhinorrhea and sinus pressure  Negative for sore throat and trouble swallowing  Respiratory: Positive for cough  Negative for wheezing  Cardiovascular: Negative for chest pain  Gastrointestinal: Negative for diarrhea, nausea and vomiting  Neurological: Negative for dizziness and headaches           Current Medications       Current Outpatient Medications:     amLODIPine (NORVASC) 10 mg tablet, daily, Disp: , Rfl:     azilsartan medoxomil (EDARBI) 40 MG tablet, Take 40 mg by mouth daily, Disp: , Rfl:     metFORMIN (GLUCOPHAGE) 500 mg tablet, Take 250 mg by mouth daily , Disp: , Rfl:     Omega-3 Fatty Acids (OMEGA-3 FISH OIL) 1000 MG CAPS, Take by mouth, Disp: , Rfl:     sertraline (ZOLOFT) 100 mg tablet, daily, Disp: , Rfl:     simvastatin (ZOCOR) 10 mg tablet, simvastatin 10 mg tablet, Disp: , Rfl:     sotalol (BETAPACE) 120 mg tablet, sotalol 120 mg tablet, Disp: , Rfl:    spironolactone (ALDACTONE) 50 mg tablet, Take 50 mg by mouth 2 (two) times a day, Disp: , Rfl: 11    XARELTO 20 MG tablet, daily, Disp: , Rfl:     clonazePAM (KlonoPIN) 0 5 mg tablet, TAKE 1 2 TABLET BY MOUTH TWICE A DAY AND TAKE 1 TABLET AT BEDTIME, Disp: , Rfl: 2    gemfibrozil (LOPID) 600 mg tablet, 2 (two) times a day, Disp: , Rfl:     nitrofurantoin (MACROBID) 100 mg capsule, Take 1 capsule (100 mg total) by mouth 2 (two) times a day (Patient not taking: Reported on 2/8/2019 ), Disp: 6 capsule, Rfl: 0    potassium chloride (K-DUR,KLOR-CON) 20 mEq tablet, 2 (two) times a day, Disp: , Rfl:     predniSONE 10 mg tablet, Take 1 tablet (10 mg total) by mouth 2 (two) times a day with meals for 3 days, Disp: 6 tablet, Rfl: 0    risperiDONE (RisperDAL) 0 5 mg tablet, daily, Disp: , Rfl:     simvastatin (ZOCOR) 5 MG tablet, daily, Disp: , Rfl:     VENTOLIN  (90 Base) MCG/ACT inhaler, Inhale 2 puffs every 6 (six) hours as needed, Disp: , Rfl: 0    Current Allergies     Allergies as of 01/28/2020    (No Known Allergies)            The following portions of the patient's history were reviewed and updated as appropriate: allergies, current medications, past family history, past medical history, past social history, past surgical history and problem list      Past Medical History:   Diagnosis Date    Atrial fibrillation ([address] )     Benign localized prostatic hyperplasia with lower urinary tract symptoms (LUTS)     Bilateral hydrocele     Hyperlipidemia     Hypertension     Other specified depressive episodes     Type 2 diabetes mellitus ([address] )        Past Surgical History:   Procedure Laterality Date    CARDIAC SURGERY  10/2018    Ablation    CARPAL TUNNEL RELEASE Bilateral 1988    CATARACT EXTRACTION, BILATERAL Bilateral 2015    CYSTOSCOPY  07/2016    HERNIA REPAIR Bilateral 2003    HYDROCELE EXCISION / REPAIR Bilateral 07/2016    THROAT SURGERY  1990    THUMB ARTHROSCOPY Right 2016       Family History   Problem Relation Age of Onset    Heart disease Mother     Diabetes Sister          Medications have been verified  Objective   /72   Pulse (!) 52   Temp 98 3 °F (36 8 °C)   Resp 18   Ht 5' 6" (1 676 m)   Wt 88 kg (194 lb)   SpO2 98%   BMI 31 31 kg/m²        Physical Exam     Physical Exam   Constitutional: He appears well-developed  No distress  HENT:   Right Ear: External ear normal    Left Ear: External ear normal    Nasal discharge  Minimal postnasal drip  Turbinates 1+  Cardiovascular: Normal rate and regular rhythm  Pulmonary/Chest: Effort normal  No respiratory distress  He has no wheezes  Mild congestion in the lungs   Lymphadenopathy:     He has no cervical adenopathy

## 2021-01-04 ENCOUNTER — OFFICE VISIT (OUTPATIENT)
Dept: UROLOGY | Facility: MEDICAL CENTER | Age: 71
End: 2021-01-04
Payer: MEDICARE

## 2021-01-04 VITALS
DIASTOLIC BLOOD PRESSURE: 80 MMHG | HEART RATE: 97 BPM | SYSTOLIC BLOOD PRESSURE: 120 MMHG | HEIGHT: 66 IN | BODY MASS INDEX: 29.57 KG/M2 | WEIGHT: 184 LBS

## 2021-01-04 DIAGNOSIS — Z12.5 SPECIAL SCREENING FOR MALIGNANT NEOPLASM OF PROSTATE: Primary | ICD-10-CM

## 2021-01-04 DIAGNOSIS — N40.0 BPH WITHOUT OBSTRUCTION/LOWER URINARY TRACT SYMPTOMS: ICD-10-CM

## 2021-01-04 DIAGNOSIS — N40.1 BENIGN LOCALIZED PROSTATIC HYPERPLASIA WITH LOWER URINARY TRACT SYMPTOMS (LUTS): ICD-10-CM

## 2021-01-04 PROCEDURE — 81003 URINALYSIS AUTO W/O SCOPE: CPT | Performed by: UROLOGY

## 2021-01-04 PROCEDURE — 99214 OFFICE O/P EST MOD 30 MIN: CPT | Performed by: UROLOGY

## 2021-01-04 RX ORDER — PREDNISONE 10 MG/1
TABLET ORAL
COMMUNITY
End: 2021-01-04 | Stop reason: ALTCHOICE

## 2021-01-04 NOTE — PROGRESS NOTES
Assessment/Plan:    Benign localized prostatic hyperplasia with lower urinary tract symptoms (LUTS)  Mildly symptomatic  Return in about 1 year  Diagnoses and all orders for this visit:    Benign localized prostatic hyperplasia with lower urinary tract symptoms (LUTS)    Special screening for malignant neoplasm of prostate  -     PSA, Total Screen; Future  -     PSA, Total Screen; Future    BPH without obstruction/lower urinary tract symptoms  -     UA (URINE) with reflex to Scope          Subjective:      Patient ID: Cheri Norman is a 79 y o  male  HPI  BPH:  He notesmild  urinary frequency, mild urinary urgency and nocturia x 2  He denies other significant urinary symptoms  He denies gross hematuria, urinary tract infections or incontinence  He is taking neither medications nor supplements for his symptoms  PSA:  0 35 on January 15, 2020  Due for 2021 PSA  AUA SYMPTOM SCORE      Most Recent Value   AUA SYMPTOM SCORE   How often have you had a sensation of not emptying your bladder completely after you finished urinating? 0   How often have you had to urinate again less than two hours after you finished urinating? 2   How often have you found you stopped and started again several times when you urinate?  0   How often have you found it difficult to postpone urination? 2   How often have you had a weak urinary stream?  0   How often have you had to push or strain to begin urination? 0   How many times did you most typically get up to urinate from the time you went to bed at night until the time you got up in the morning?   2   Quality of Life: If you were to spend the rest of your life with your urinary condition just the way it is now, how would you feel about that?  2   AUA SYMPTOM SCORE  6          The following portions of the patient's history were reviewed and updated as appropriate: allergies, current medications, past family history, past medical history, past social history, past surgical history and problem list     Review of Systems   Constitutional: Negative for activity change and fatigue  Respiratory: Negative for shortness of breath and wheezing  Cardiovascular: Negative for chest pain  Hypertension  The patient had a successful cardioversion in the past   He is taking Xarelto  Still in and out of a fib  Had an ablation  Hypertension  Gastrointestinal: Negative for abdominal pain  Endocrine:        NIDDM with good control  Genitourinary: Negative for difficulty urinating, dysuria, frequency, hematuria and urgency  Musculoskeletal: Negative for back pain and gait problem  Skin: Negative  Allergic/Immunologic: Negative  Neurological: Negative  Psychiatric/Behavioral: Negative  Objective:      /80   Pulse 97   Ht 5' 6" (1 676 m)   Wt 83 5 kg (184 lb)   BMI 29 70 kg/m²          Physical Exam  Constitutional:       Appearance: He is well-developed  HENT:      Head: Normocephalic and atraumatic  Neck:      Musculoskeletal: Normal range of motion and neck supple  Pulmonary:      Effort: Pulmonary effort is normal    Genitourinary:     Rectum: Normal       Comments: The prostate is 40 grams, firm, smooth and non-tender  Musculoskeletal: Normal range of motion  Skin:     General: Skin is warm and dry  Neurological:      Mental Status: He is alert and oriented to person, place, and time  Psychiatric:         Behavior: Behavior normal          Thought Content:  Thought content normal          Judgment: Judgment normal

## 2021-01-05 ENCOUNTER — TELEPHONE (OUTPATIENT)
Dept: UROLOGY | Facility: MEDICAL CENTER | Age: 71
End: 2021-01-05

## 2021-01-05 NOTE — TELEPHONE ENCOUNTER
Johanna Jackson from [facility] calling to inquire about patients urine sample from his appointment 1/4  States it is listed on the slip but no sample was received      She can be reached at [phone]

## 2021-10-08 ENCOUNTER — OFFICE VISIT (OUTPATIENT)
Dept: URGENT CARE | Facility: CLINIC | Age: 71
End: 2021-10-08
Payer: MEDICARE

## 2021-10-08 ENCOUNTER — TELEPHONE (OUTPATIENT)
Dept: URGENT CARE | Facility: CLINIC | Age: 71
End: 2021-10-08

## 2021-10-08 VITALS
RESPIRATION RATE: 20 BRPM | TEMPERATURE: 97.2 F | OXYGEN SATURATION: 96 % | WEIGHT: 184 LBS | HEART RATE: 85 BPM | BODY MASS INDEX: 29.57 KG/M2 | HEIGHT: 66 IN

## 2021-10-08 DIAGNOSIS — J22 LOWER RESPIRATORY INFECTION: Primary | ICD-10-CM

## 2021-10-08 LAB — SARS-COV-2 RNA RESP QL NAA+PROBE: NEGATIVE

## 2021-10-08 PROCEDURE — U0003 INFECTIOUS AGENT DETECTION BY NUCLEIC ACID (DNA OR RNA); SEVERE ACUTE RESPIRATORY SYNDROME CORONAVIRUS 2 (SARS-COV-2) (CORONAVIRUS DISEASE [COVID-19]), AMPLIFIED PROBE TECHNIQUE, MAKING USE OF HIGH THROUGHPUT TECHNOLOGIES AS DESCRIBED BY CMS-2020-01-R: HCPCS | Performed by: PHYSICIAN ASSISTANT

## 2021-10-08 PROCEDURE — U0005 INFEC AGEN DETEC AMPLI PROBE: HCPCS | Performed by: PHYSICIAN ASSISTANT

## 2021-10-08 PROCEDURE — G0463 HOSPITAL OUTPT CLINIC VISIT: HCPCS | Performed by: PHYSICIAN ASSISTANT

## 2021-10-08 PROCEDURE — 99213 OFFICE O/P EST LOW 20 MIN: CPT | Performed by: PHYSICIAN ASSISTANT

## 2021-10-08 RX ORDER — ALBUTEROL SULFATE 90 UG/1
2 AEROSOL, METERED RESPIRATORY (INHALATION) EVERY 4 HOURS PRN
Qty: 8 G | Refills: 0 | Status: SHIPPED | OUTPATIENT
Start: 2021-10-08

## 2021-10-08 RX ORDER — PREDNISONE 10 MG/1
TABLET ORAL
Qty: 15 TABLET | Refills: 0 | Status: SHIPPED | OUTPATIENT
Start: 2021-10-08 | End: 2021-10-13

## 2021-10-08 RX ORDER — BENZONATATE 200 MG/1
200 CAPSULE ORAL 3 TIMES DAILY PRN
Qty: 20 CAPSULE | Refills: 0 | Status: SHIPPED | OUTPATIENT
Start: 2021-10-08

## 2021-10-08 RX ORDER — FUROSEMIDE 20 MG/1
20 TABLET ORAL
COMMUNITY
Start: 2021-09-20 | End: 2022-09-20

## 2021-10-08 RX ORDER — AZITHROMYCIN 250 MG/1
TABLET, FILM COATED ORAL
Qty: 6 TABLET | Refills: 0 | Status: SHIPPED | OUTPATIENT
Start: 2021-10-08 | End: 2021-10-12

## 2022-03-31 ENCOUNTER — OFFICE VISIT (OUTPATIENT)
Dept: UROLOGY | Facility: MEDICAL CENTER | Age: 72
End: 2022-03-31
Payer: MEDICARE

## 2022-03-31 VITALS
DIASTOLIC BLOOD PRESSURE: 70 MMHG | WEIGHT: 186.8 LBS | HEIGHT: 66 IN | HEART RATE: 72 BPM | BODY MASS INDEX: 30.02 KG/M2 | SYSTOLIC BLOOD PRESSURE: 120 MMHG

## 2022-03-31 DIAGNOSIS — N13.8 BPH WITH URINARY OBSTRUCTION: Primary | ICD-10-CM

## 2022-03-31 DIAGNOSIS — N40.1 BPH WITH URINARY OBSTRUCTION: Primary | ICD-10-CM

## 2022-03-31 PROCEDURE — 99213 OFFICE O/P EST LOW 20 MIN: CPT | Performed by: UROLOGY

## 2022-03-31 NOTE — PROGRESS NOTES
HISTORY:    Follow-up mild BPH  No real change in the past year or so  Daytime not much frequency  Can hold it long enough  Nocturia x2     In 2019, he was given oxybutynin for overactive bladder, but did not take it very long  No brought problems with frequency now     Recent PSA 0 7, it is always been low  ASSESSMENT / PLAN:    No real symptoms that are bothersome     Next visit in two years, will order PSA at that time    The following portions of the patient's history were reviewed and updated as appropriate: allergies, current medications, past family history, past medical history, past social history, past surgical history and problem list     Review of Systems   All other systems reviewed and are negative  Objective:     Physical Exam  Constitutional:       General: He is not in acute distress  Appearance: He is well-developed  He is not diaphoretic  HENT:      Head: Normocephalic and atraumatic  Eyes:      General: No scleral icterus  Pulmonary:      Effort: Pulmonary effort is normal    Genitourinary:     Comments: Penis testes normal     Prostate minimally enlarged no nodules  Skin:     Coloration: Skin is not pale  Neurological:      Mental Status: He is alert and oriented to person, place, and time  Psychiatric:         Behavior: Behavior normal          Thought Content: Thought content normal          Judgment: Judgment normal            No results found for: PSA]  BUN   Date Value Ref Range Status   07/17/2019 27 (H) 5 - 25 mg/dL Final     Creatinine   Date Value Ref Range Status   07/17/2019 1 26 0 60 - 1 30 mg/dL Final     Comment:     Standardized to IDMS reference method     No components found for: CBC      Patient Active Problem List   Diagnosis    Benign localized prostatic hyperplasia with lower urinary tract symptoms (LUTS)    Overactive bladder        There are no diagnoses linked to this encounter  Patient ID: Minh Carbajal is a 70 y o  male       Current Outpatient Medications:     amLODIPine (NORVASC) 10 mg tablet, daily, Disp: , Rfl:     metFORMIN (GLUCOPHAGE) 500 mg tablet, Take 250 mg by mouth daily , Disp: , Rfl:     Omega-3 Fatty Acids (OMEGA-3 FISH OIL) 1000 MG CAPS, Take by mouth, Disp: , Rfl:     sertraline (ZOLOFT) 100 mg tablet, daily, Disp: , Rfl:     simvastatin (ZOCOR) 5 MG tablet, daily, Disp: , Rfl:     sotalol (BETAPACE) 120 mg tablet, sotalol 120 mg tablet, Disp: , Rfl:     spironolactone (ALDACTONE) 50 mg tablet, Take 50 mg by mouth 2 (two) times a day, Disp: , Rfl: 11    XARELTO 20 MG tablet, daily, Disp: , Rfl:     albuterol (PROVENTIL HFA,VENTOLIN HFA) 90 mcg/act inhaler, Inhale 2 puffs every 4 (four) hours as needed for wheezing (Patient not taking: Reported on 3/31/2022 ), Disp: 8 g, Rfl: 0    azilsartan medoxomil (EDARBI) 40 MG tablet, Take 80 mg by mouth daily  (Patient not taking: Reported on 3/31/2022 ), Disp: , Rfl:     benzonatate (TESSALON) 200 MG capsule, Take 1 capsule (200 mg total) by mouth 3 (three) times a day as needed for cough (Patient not taking: Reported on 3/31/2022 ), Disp: 20 capsule, Rfl: 0    co-enzyme Q-10 30 MG capsule, Take 30 mg by mouth (Patient not taking: Reported on 3/31/2022 ), Disp: , Rfl:     furosemide (LASIX) 20 mg tablet, Take 20 mg by mouth (Patient not taking: Reported on 3/31/2022 ), Disp: , Rfl:     Past Medical History:   Diagnosis Date    [facility] )     Benign localized prostatic hyperplasia with lower urinary tract symptoms (LUTS)     Bilateral hydrocele     Hyperlipidemia     Hypertension     Other specified depressive episodes     Type 2 diabetes mellitus ([facility] )        Past Surgical History:   Procedure Laterality Date    ATRIAL CARDIAC PACEMAKER INSERTION      CARDIAC SURGERY  10/2018    Ablation    CARPAL TUNNEL RELEASE Bilateral 1988    CATARACT EXTRACTION, BILATERAL Bilateral 2015    CYSTOSCOPY  07/2016    HERNIA REPAIR Bilateral 2003  HYDROCELE EXCISION / REPAIR Bilateral 07/2016    THROAT SURGERY  1990    THUMB ARTHROSCOPY Right 2016       Social History

## 2022-10-07 ENCOUNTER — APPOINTMENT (EMERGENCY)
Dept: CT IMAGING | Facility: HOSPITAL | Age: 72
End: 2022-10-07
Payer: MEDICARE

## 2022-10-07 ENCOUNTER — HOSPITAL ENCOUNTER (EMERGENCY)
Facility: HOSPITAL | Age: 72
Discharge: HOME | End: 2022-10-07
Attending: EMERGENCY MEDICINE
Payer: MEDICARE

## 2022-10-07 VITALS
SYSTOLIC BLOOD PRESSURE: 138 MMHG | BODY MASS INDEX: 29.5 KG/M2 | RESPIRATION RATE: 16 BRPM | HEART RATE: 64 BPM | TEMPERATURE: 98.9 F | WEIGHT: 182.76 LBS | OXYGEN SATURATION: 95 % | DIASTOLIC BLOOD PRESSURE: 66 MMHG

## 2022-10-07 DIAGNOSIS — S30.0XXA CONTUSION OF SACRUM, INITIAL ENCOUNTER: Primary | ICD-10-CM

## 2022-10-07 PROCEDURE — 72192 CT PELVIS W/O DYE: CPT

## 2022-10-07 PROCEDURE — 99284 EMERGENCY DEPT VISIT MOD MDM: CPT

## 2022-10-07 PROCEDURE — 99284 EMERGENCY DEPT VISIT MOD MDM: CPT | Performed by: EMERGENCY MEDICINE

## 2022-10-07 PROCEDURE — 96372 THER/PROPH/DIAG INJ SC/IM: CPT

## 2022-10-07 PROCEDURE — 72131 CT LUMBAR SPINE W/O DYE: CPT

## 2022-10-07 RX ORDER — MORPHINE SULFATE 4 MG/ML
4 INJECTION, SOLUTION INTRAMUSCULAR; INTRAVENOUS ONCE
Status: COMPLETED | OUTPATIENT
Start: 2022-10-07 | End: 2022-10-07

## 2022-10-07 RX ORDER — OXYCODONE HYDROCHLORIDE AND ACETAMINOPHEN 5; 325 MG/1; MG/1
1 TABLET ORAL EVERY 6 HOURS PRN
Qty: 20 TABLET | Refills: 0 | Status: SHIPPED | OUTPATIENT
Start: 2022-10-07

## 2022-10-07 RX ADMIN — MORPHINE SULFATE 4 MG: 4 INJECTION INTRAVENOUS at 19:48

## 2022-10-07 NOTE — ED PROVIDER NOTES
History  Chief Complaint   Patient presents with    Back Injury     Patient fell off bar stool at home landing on side  Patient is on xeralto  Occurred 2 weeks ago but low back pain is persisting      Patient is a 19-year-old male  He is on Xarelto  He fell about 2 weeks ago  He did not strike his head  He fell off of a stool  He is complaining of lumbar and sacral pain  No motor or sensory complaints in the lower extremities  No incontinence  He denies any other injuries  The pain is moderately severe  It is not improved  It is worse with movement  He denies any other associated injuries  Prior to Admission Medications   Prescriptions Last Dose Informant Patient Reported? Taking?    Omega-3 Fatty Acids (OMEGA-3 FISH OIL) 1000 MG CAPS  Self Yes Yes   Sig: Take by mouth   XARELTO 20 MG tablet  Self Yes Yes   Sig: daily   amLODIPine (NORVASC) 10 mg tablet  Self Yes Yes   Sig: daily   metFORMIN (GLUCOPHAGE) 500 mg tablet  Self Yes Yes   Sig: Take 250 mg by mouth daily    sertraline (ZOLOFT) 100 mg tablet  Self Yes Yes   Sig: daily   simvastatin (ZOCOR) 5 MG tablet  Self Yes Yes   Sig: daily   sotalol (BETAPACE) 120 mg tablet  Self Yes Yes   Si (two) times a day   spironolactone (ALDACTONE) 50 mg tablet  Self Yes Yes   Sig: Take 50 mg by mouth 2 (two) times a day      Facility-Administered Medications: None       Past Medical History:   Diagnosis Date    Atrial fibrillation (HCC)     Benign localized prostatic hyperplasia with lower urinary tract symptoms (LUTS)     Bilateral hydrocele     Hyperlipidemia     Hypertension     Other specified depressive episodes     Type 2 diabetes mellitus ([facility] )        Past Surgical History:   Procedure Laterality Date    ATRIAL CARDIAC PACEMAKER INSERTION      CARDIAC SURGERY  10/2018    Ablation    CARPAL TUNNEL RELEASE Bilateral     CATARACT EXTRACTION, BILATERAL Bilateral     CYSTOSCOPY  2016    HERNIA REPAIR Bilateral     HYDROCELE EXCISION / REPAIR Bilateral 07/2016    THROAT SURGERY  1990    THUMB ARTHROSCOPY Right 2016       Family History   Problem Relation Age of Onset    Heart disease Mother     Diabetes Sister      I have reviewed and agree with the history as documented  E-Cigarette/Vaping    E-Cigarette Use Never User      E-Cigarette/Vaping Substances     Social History     Tobacco Use    Smoking status: Never Smoker    Smokeless tobacco: Never Used   Vaping Use    Vaping Use: Never used   Substance Use Topics    Alcohol use: No    Drug use: No       Review of Systems   Constitutional: Negative for chills and fever  HENT: Negative for rhinorrhea and sore throat  Eyes: Negative for pain, redness and visual disturbance  Respiratory: Negative for cough and shortness of breath  Cardiovascular: Negative for chest pain and leg swelling  Gastrointestinal: Negative for abdominal pain, diarrhea and vomiting  Endocrine: Negative for polydipsia and polyuria  Genitourinary: Negative for dysuria, frequency and hematuria  Musculoskeletal: Positive for back pain  Negative for neck pain  Skin: Negative for rash and wound  Allergic/Immunologic: Negative for immunocompromised state  Neurological: Negative for weakness, numbness and headaches  Psychiatric/Behavioral: Negative for hallucinations and suicidal ideas  All other systems reviewed and are negative  Physical Exam  Physical Exam  Vitals reviewed  Constitutional:       General: He is not in acute distress  Appearance: He is not toxic-appearing  HENT:      Head: Normocephalic and atraumatic  Nose: Nose normal       Mouth/Throat:      Mouth: Mucous membranes are moist    Eyes:      General:         Right eye: No discharge  Left eye: No discharge  Conjunctiva/sclera: Conjunctivae normal    Cardiovascular:      Rate and Rhythm: Normal rate and regular rhythm  Pulses: Normal pulses        Heart sounds: Normal heart sounds  No murmur heard  No friction rub  No gallop  Pulmonary:      Effort: Pulmonary effort is normal  No respiratory distress  Breath sounds: Normal breath sounds  No stridor  No wheezing, rhonchi or rales  Abdominal:      General: Bowel sounds are normal  There is no distension  Palpations: Abdomen is soft  Tenderness: There is no abdominal tenderness  There is no right CVA tenderness, left CVA tenderness, guarding or rebound  Musculoskeletal:         General: Tenderness present  No swelling, deformity or signs of injury  Cervical back: Normal range of motion and neck supple  No rigidity  Right lower leg: No edema  Left lower leg: No edema  Comments: No calf pain or unilateral leg swelling  Pelvis is stable  Extremities are atraumatic  There is tenderness to the lower lumbar spine  There is sacral and coccyx tenderness  No crepitus  Skin:     General: Skin is warm and dry  Coloration: Skin is not jaundiced or pale  Findings: No bruising, erythema or rash  Neurological:      General: No focal deficit present  Mental Status: He is alert and oriented to person, place, and time  Cranial Nerves: No facial asymmetry  Sensory: No sensory deficit  Motor: Motor function is intact     Psychiatric:         Mood and Affect: Mood normal          Behavior: Behavior normal          Vital Signs  ED Triage Vitals   Temperature Pulse Respirations Blood Pressure SpO2   10/07/22 1758 10/07/22 1758 10/07/22 1758 10/07/22 1758 10/07/22 1758   98 9 °F (37 2 °C) 69 16 133/64 97 %      Temp Source Heart Rate Source Patient Position - Orthostatic VS BP Location FiO2 (%)   10/07/22 1758 10/07/22 1758 10/07/22 1951 10/07/22 1951 --   Oral Monitor Lying Right arm       Pain Score       10/07/22 1948       8           Vitals:    10/07/22 1758 10/07/22 1951 10/07/22 2249   BP: 133/64 136/69 138/66   Pulse: 69 64 64   Patient Position - Orthostatic VS:  Lying Lying         Visual Acuity      ED Medications  Medications   morphine injection 4 mg (4 mg Intramuscular Given 10/7/22 1948)       Diagnostic Studies  Results Reviewed     None                 CT pelvis wo contrast   Final Result by Phillip Donato MD (10/07 2333)      No evidence of fracture  Workstation performed: [id number]         CT spine lumbar without contrast   Final Result by Phillip Donato MD (10/07 2110)      T12 superior endplate compression fracture with minimal loss of vertebral body height  Workstation performed: [id number]                    Procedures  Procedures         ED Course                                             MDM  Number of Diagnoses or Management Options  Diagnosis management comments: There was a T12 compression fracture of the endplate on imaging  This might be old as patient did not have any tenderness there  Otherwise, imaging was negative  Likely contusion  Will prescribe analgesia and refer to our [address]  Appropriate for discharge and outpatient management  No signs or symptoms of cauda equina syndrome  Amount and/or Complexity of Data Reviewed  Tests in the radiology section of CPT®: ordered and reviewed        Disposition  Final diagnoses:   Contusion of sacrum, initial encounter     Time reflects when diagnosis was documented in both MDM as applicable and the Disposition within this note     Time User Action Codes Description Comment    10/7/2022 11:47 PM Rosalio Olivarez Add [S30  0XXA] Contusion of sacrum, initial encounter       ED Disposition     ED Disposition   Discharge    Condition   Stable    Date/Time   Fri Oct 7, 2022 11:47 PM    Comment   Priti Melchor discharge to home/self care                 Follow-up Information     Follow up With Specialties Details Why Contact Info Additional Information    [facility] Physical Therapy In 1 week  [phone]    Jessica Rocha MD Internal Medicine In 1 week [address]             Patient's Medications   Discharge Prescriptions    OXYCODONE-ACETAMINOPHEN (PERCOCET) 5-325 MG PER TABLET    Take 1 tablet by mouth every 6 (six) hours as needed for severe pain Max Daily Amount: 4 tablets       Start Date: 10/7/2022 End Date: --       Order Dose: 1 tablet       Quantity: 20 tablet    Refills: 0           PDMP Review     None          ED Provider  Electronically Signed by           Thi Cervantes MD  10/07/22 6411

## 2022-10-10 ENCOUNTER — NURSE TRIAGE (OUTPATIENT)
Dept: PHYSICAL THERAPY | Facility: OTHER | Age: 72
End: 2022-10-10

## 2022-10-10 DIAGNOSIS — M54.50 ACUTE BILATERAL LOW BACK PAIN WITHOUT SCIATICA: Primary | ICD-10-CM

## 2022-10-10 NOTE — TELEPHONE ENCOUNTER
Additional Information  • Negative: Is this related to a work injury? • Negative: Is this related to an MVA? • Negative: Are you currently recieving homecare services? Background - Initial Assessment  Clinical complaint: Pain is bilat low back, denies radiation, no numbness or tingling  Started 9/23/22, had a fall off a bar stool at home  Was seen in the ED 10/7/22    Date of onset: 9/23/22  Frequency of pain: intermittent  Quality of pain: sharp    Protocols used: [facility] COMPREHENSIVE SPINE PROGRAM PROTOCOL

## 2022-10-10 NOTE — TELEPHONE ENCOUNTER
Additional Information  • Negative: Has the patient had unexplained weight loss? • Negative: Does the patient have a fever? • Negative: Is the patient experiencing blood in sputum? • Negative: Is the patient experiencing urine retention? • Negative: Is the patient experiencing acute drop foot or paralysis? • Affirmative: Has the patient experienced major trauma? (fall from height, high speed collision, direct blow to spine) and is also experiencing nausea, light-headedness, or loss of consciousness? Fall off a bar stool at home  Was seen and evaluated in the ED on 10/7/22    Protocols used: SL AMB COMPREHENSIVE SPINE PROGRAM PROTOCOL    This RN did review in detail the Comprehensive Spine Program and what we can provide for their back pain  Patient is agreeable to being triaged by this RN and would like to proceed with Physical Therapy  Referral was placed for Physical Therapy at the [facility] site  Patients information was sent to the  to make evaluation appointment  Patient made aware that the PT office  will be calling to schedule the appointment  Patient was provided with the phone number to the PT office  No further questions and/or concerns were voiced by the patient at this time  Patient states understanding of the referral that was placed

## 2022-11-03 ENCOUNTER — EVALUATION (OUTPATIENT)
Dept: PHYSICAL THERAPY | Facility: REHABILITATION | Age: 72
End: 2022-11-03

## 2022-11-03 DIAGNOSIS — M54.50 ACUTE BILATERAL LOW BACK PAIN WITHOUT SCIATICA: ICD-10-CM

## 2022-11-03 NOTE — PROGRESS NOTES
PT Evaluation     Today's date: 11/3/2022  Patient name: Zoraida Correa  : 1950  MRN: [id number]  Referring provider: Mony Anderson PT  Dx:   Encounter Diagnosis     ICD-10-CM    1  Acute bilateral low back pain without sciatica  M54 50 Ambulatory referral to PT spine       Start Time: 1450  Stop Time: 1525  Total time in clinic (min): 35 minutes    Assessment  Assessment details: Patient is a 70 y o  male that presents through comprehensive spine program with low back pain  Patient presents with decreased strength and decreased ROM  Patient has difficulty with movement/pain in the morning and after getting up from the floor secondary to impairments  Patient would benefit from skilled physical therapy services to address impairments to maximize function  Patient is provided HEP this visit and asked to call to follow up in two weeks  If symptoms do not improve or progress will bring back for follow up visit  Impairments: abnormal or restricted ROM, impaired physical strength and lacks appropriate home exercise program  Understanding of Dx/Px/POC: good   Prognosis: good    Goals  Impairment:  1  Patient will reports 50% reduction in pain in 4 weeks to maximize function  2   Patient will improve strength to 4/5 in all planes to maximize function  3   Patient will improve ROM to [facility] in 4 weeks to maximize function  Functional:  1  Patient will improve FOTO by 14 points to 67/100 in 4 weeks to maximize function  2  Patient will be independent with HEP in 4 weeks to maximize function  3  Patient will report no difficulty with movement after getting up from the floor or in the morning in 4 weeks to maximize function  Plan  Plan details: Patient will be a RE in 4 weeks      Patient would benefit from: skilled physical therapy  Planned modality interventions: cryotherapy and thermotherapy: hydrocollator packs  Planned therapy interventions: abdominal trunk stabilization, manual therapy, neuromuscular re-education, patient education, strengthening, therapeutic activities, stretching, therapeutic exercise, home exercise program, functional ROM exercises, flexibility and postural training  Frequency: 2x month  Treatment plan discussed with: patient        Subjective Evaluation    History of Present Illness  Mechanism of injury: trauma  Mechanism of injury: Patient presents with low back pain that started around the second week in September after a fall while going to sit on a stool  Patient waited until 10/7 to go to the ER after his pain did not reduce  Patient had CT scans negative for fracture  Patient reports being about 98% back to normal at this point  His pain started to improve over the last week or two  Patient reports pain was severe and had a lot of trouble moving  At this time he is pain free most of the day, but will have pain getting up in the morning or getting up from the floor  Pain  At best pain ratin  At worst pain rating: 3  Location: lumbar spine  Quality: annoying  Exacerbated by: getting up from floor or out of bed    Progression: improved    Treatments  Current treatment: physical therapy  Patient Goals  Patient goals for therapy: decreased pain, increased strength and increased motion          Objective     Active Range of Motion     Lumbar   Flexion:  Restriction level: moderate  Extension:  Restriction level: minimal  Left lateral flexion:  Restriction level: minimal  Right lateral flexion:  Restriction level: minimal    Strength/Myotome Testing     Left Hip   Planes of Motion   Flexion: 4  External rotation: 4  Internal rotation: 4    Right Hip   Planes of Motion   Flexion: 4  External rotation: 4  Internal rotation: 4    Left Knee   Flexion: 4  Extension: 4-    Right Knee   Flexion: 4  Extension: 4             Precautions: hx depression, DM II, HTN, pacemaker      Manuals 11/3                                                                Neuro Re-Ed Slump correct issued            lateral seated flexion issued                                                                             Ther Ex                                                                                                                     Ther Activity                                       Gait Training                                       Modalities                                         Access Code [email or web address]

## 2022-11-03 NOTE — LETTER
November 3, 2022    Eric Martinez PT    Patient: Kajal Bejarano  YOB: 1950   Date of Visit: 11/3/2022      Dear Dr Cherelle Bradford:    One of your patients, Kajal Bejarano, was referred to me by the [facility] Comprehensive Sp[facility]   Please review the attached evaluation summary from [address] [place] [address] recent visit  Please verify that you agree with the plan of care by signing the attached document and sending it back to our office  If you have any questions or concerns, please don't hesitate to call  Sincerely,    Ryan Jay      Primary Care Provider:      I certify that I have read the below Plan of Care and certify the need for these services furnished under this plan of treatment while under my care  Eric Martinez PT  [facility] [place]           PT Evaluation     Today's date: 11/3/2022  Patient name: Kajal Bejarano  : 1950  MRN: [id number]  Referring provider: Ortiz Chris PT  Dx:   Encounter Diagnosis     ICD-10-CM    1  Acute bilateral low back pain without sciatica  M54 50 Ambulatory referral to PT spine       Start Time: 1450  Stop Time: 1525  Total time in clinic (min): 35 minutes    Assessment  Assessment details: Patient is a 70 y o  male that presents through comprehensive spine program with low back pain  Patient presents with decreased strength and decreased ROM  Patient has difficulty with movement/pain in the morning and after getting up from the floor secondary to impairments  Patient would benefit from skilled physical therapy services to address impairments to maximize function  Patient is provided HEP this visit and asked to call to follow up in two weeks  If symptoms do not improve or progress will bring back for follow up visit  Impairments: abnormal or restricted ROM, impaired physical strength and lacks appropriate home exercise program  Understanding of Dx/Px/POC: good   Prognosis: good    Goals  Impairment:  1  Patient will reports 50% reduction in pain in 4 weeks to maximize function  2   Patient will improve strength to 4/5 in all planes to maximize function  3   Patient will improve ROM to [facility] in 4 weeks to maximize function  Functional:  1  Patient will improve FOTO by 14 points to 67/100 in 4 weeks to maximize function  2  Patient will be independent with HEP in 4 weeks to maximize function  3  Patient will report no difficulty with movement after getting up from the floor or in the morning in 4 weeks to maximize function  Plan  Plan details: Patient will be a RE in 4 weeks  Patient would benefit from: skilled physical therapy  Planned modality interventions: cryotherapy and thermotherapy: hydrocollator packs  Planned therapy interventions: abdominal trunk stabilization, manual therapy, neuromuscular re-education, patient education, strengthening, therapeutic activities, stretching, therapeutic exercise, home exercise program, functional ROM exercises, flexibility and postural training  Frequency: 2x month  Treatment plan discussed with: patient        Subjective Evaluation    History of Present Illness  Mechanism of injury: trauma  Mechanism of injury: Patient presents with low back pain that started around the second week in September after a fall while going to sit on a stool  Patient waited until 10/7 to go to the ER after his pain did not reduce  Patient had CT scans negative for fracture  Patient reports being about 98% back to normal at this point  His pain started to improve over the last week or two  Patient reports pain was severe and had a lot of trouble moving  At this time he is pain free most of the day, but will have pain getting up in the morning or getting up from the floor  Pain  At best pain ratin  At worst pain rating: 3  Location: lumbar spine  Quality: annoying  Exacerbated by: getting up from floor or out of bed    Progression: improved    Treatments  Current treatment: physical therapy  Patient Goals  Patient goals for therapy: decreased pain, increased strength and increased motion          Objective     Active Range of Motion     Lumbar   Flexion:  Restriction level: moderate  Extension:  Restriction level: minimal  Left lateral flexion:  Restriction level: minimal  Right lateral flexion:  Restriction level: minimal    Strength/Myotome Testing     Left Hip   Planes of Motion   Flexion: 4  External rotation: 4  Internal rotation: 4    Right Hip   Planes of Motion   Flexion: 4  External rotation: 4  Internal rotation: 4    Left Knee   Flexion: 4  Extension: 4-    Right Knee   Flexion: 4  Extension: 4             Precautions: hx depression, DM II, HTN, pacemaker      Manuals 11/3                                                                Neuro Re-Ed             Slump correct issued            lateral seated flexion issued                                                                             Ther Ex                                                                                                                     Ther Activity                                       Gait Training                                       Modalities                                         Access Code [email or web address]

## 2022-11-09 ENCOUNTER — TELEPHONE (OUTPATIENT)
Dept: PHYSICAL THERAPY | Facility: OTHER | Age: 72
End: 2022-11-09

## 2022-11-09 NOTE — TELEPHONE ENCOUNTER
Contacted patient for a follow up call regarding her CSP triage  Patient states he was evaluated by PT and is doing better  States he was pleased with our triage process

## 2023-04-29 ENCOUNTER — APPOINTMENT (EMERGENCY)
Dept: RADIOLOGY | Facility: HOSPITAL | Age: 73
End: 2023-04-29

## 2023-04-29 ENCOUNTER — HOSPITAL ENCOUNTER (EMERGENCY)
Facility: HOSPITAL | Age: 73
Discharge: HOME | End: 2023-04-29
Attending: EMERGENCY MEDICINE

## 2023-04-29 VITALS
HEART RATE: 78 BPM | OXYGEN SATURATION: 96 % | TEMPERATURE: 98.6 F | SYSTOLIC BLOOD PRESSURE: 144 MMHG | WEIGHT: 188.71 LBS | BODY MASS INDEX: 29.62 KG/M2 | HEIGHT: 67 IN | RESPIRATION RATE: 19 BRPM | DIASTOLIC BLOOD PRESSURE: 84 MMHG

## 2023-04-29 DIAGNOSIS — R05.9 COUGH: Primary | ICD-10-CM

## 2023-04-29 RX ORDER — BENZONATATE 100 MG/1
100 CAPSULE ORAL EVERY 8 HOURS
Qty: 21 CAPSULE | Refills: 0 | Status: SHIPPED | OUTPATIENT
Start: 2023-04-29

## 2023-04-30 NOTE — ED PROVIDER NOTES
History  Chief Complaint   Patient presents with    Cough     Pt reports he had a cough x 6 days  Pt states last  he went to urgent care where he was prescribed amox and flonase  Pt states the coughing has gotten worse since then  Pt states cough is dry  Pt also c/o nasal congestion which is clear in drainage  80-year-old male with PMH of A-fib s/p ablation, TAMARA, BPH, HLD, HTN, DM type II presents for evaluation of persistent cough x10 days  Patient states that he initially had nasal congestion, chills, sore throat  [facility]in and Flonase for suspected sinus infection  States symptoms have not been improving with antibiotics  Describes his cough as constant, dry, experienced 1-2 episodes of posttussive emesis  He is fully immunized including COVID vaccination x3 and pneumococcal vaccination  Denies significant shortness of breath, chest pain, dizziness, weakness, syncope, palpitations, hemoptysis, nausea, vomiting, fever, diarrhea  No recent travel  PMH of TAMARA, uses CPAP at night  Prior to Admission Medications   Prescriptions Last Dose Informant Patient Reported? Taking?    Omega-3 Fatty Acids (OMEGA-3 FISH OIL) 1000 MG CAPS   Yes No   Sig: Take by mouth   XARELTO 20 MG tablet   Yes No   Sig: daily   amLODIPine (NORVASC) 10 mg tablet   Yes No   Sig: daily   metFORMIN (GLUCOPHAGE) 500 mg tablet   Yes No   Sig: Take 250 mg by mouth daily    oxyCODONE-acetaminophen (Percocet) 5-325 mg per tablet   No No   Sig: Take 1 tablet by mouth every 6 (six) hours as needed for severe pain Max Daily Amount: 4 tablets   Patient not taking: Reported on 11/3/2022   sertraline (ZOLOFT) 100 mg tablet   Yes No   Sig: daily   simvastatin (ZOCOR) 5 MG tablet   Yes No   Sig: daily   sotalol (BETAPACE) 120 mg tablet   Yes No   Si (two) times a day   spironolactone (ALDACTONE) 50 mg tablet   Yes No   Sig: Take 50 mg by mouth 2 (two) times a day Facility-Administered Medications: None       Past Medical History:   Diagnosis Date    Atrial fibrillation ([facility] )     Benign localized prostatic hyperplasia with lower urinary tract symptoms (LUTS)     Bilateral hydrocele     Hyperlipidemia     Hypertension     Other specified depressive episodes     [facility] )        Past Surgical History:   Procedure Laterality Date    ATRIAL CARDIAC PACEMAKER INSERTION      CARDIAC SURGERY  10/2018    Ablation    CARPAL TUNNEL RELEASE Bilateral 1988    CATARACT EXTRACTION, BILATERAL Bilateral 2015    CYSTOSCOPY  07/2016    HERNIA REPAIR Bilateral 2003    HYDROCELE EXCISION / REPAIR Bilateral 07/2016    THROAT SURGERY  1990    THUMB ARTHROSCOPY Right 2016       Family History   Problem Relation Age of Onset    Heart disease Mother     Diabetes Sister      I have reviewed and agree with the history as documented  E-Cigarette/Vaping    E-Cigarette Use Never User      E-Cigarette/Vaping Substances     Social History     Tobacco Use    Smoking status: Never    Smokeless tobacco: Never   Vaping Use    Vaping Use: Never used   Substance Use Topics    Alcohol use: No    Drug use: No       Review of Systems   Constitutional: Negative for chills and fever  HENT: Negative for ear pain and sore throat  Eyes: Negative for pain and visual disturbance  Respiratory: Positive for cough  Negative for shortness of breath  Cardiovascular: Negative for chest pain and palpitations  Gastrointestinal: Negative for abdominal pain and vomiting  Genitourinary: Negative for dysuria and hematuria  Musculoskeletal: Positive for myalgias  Negative for arthralgias and back pain  Skin: Negative for color change and rash  Neurological: Negative for seizures and syncope  All other systems reviewed and are negative  Physical Exam  Physical Exam  Vitals and nursing note reviewed     Constitutional:       General: He is not in acute distress  Appearance: He is well-developed  Comments: Dry cough throughout exam    HENT:      Head: Normocephalic and atraumatic  Eyes:      Conjunctiva/sclera: Conjunctivae normal    Cardiovascular:      Rate and Rhythm: Normal rate and regular rhythm  Heart sounds: No murmur heard  Pulmonary:      Effort: Pulmonary effort is normal  No tachypnea, accessory muscle usage, prolonged expiration or respiratory distress  Breath sounds: No decreased air movement  Examination of the right-lower field reveals rales  Rales present  Abdominal:      Palpations: Abdomen is soft  Tenderness: There is no abdominal tenderness  Musculoskeletal:         General: No swelling  Cervical back: Neck supple  Skin:     General: Skin is warm and dry  Capillary Refill: Capillary refill takes less than 2 seconds  Neurological:      Mental Status: He is alert  Psychiatric:         Mood and Affect: Mood normal          Vital Signs  ED Triage Vitals [04/29/23 2002]   Temperature Pulse Respirations Blood Pressure SpO2   98 6 °F (37 °C) 78 19 144/84 96 %      Temp Source Heart Rate Source Patient Position - Orthostatic VS BP Location FiO2 (%)   Oral Monitor Sitting Right arm --      Pain Score       --           Vitals:    04/29/23 2002   BP: 144/84   Pulse: 78   Patient Position - Orthostatic VS: Sitting         Visual Acuity      ED Medications  Medications - No data to display    Diagnostic Studies  Results Reviewed     None                 XR chest 2 views    (Results Pending)              Procedures  Procedures         ED Course                               SBIRT 20yo+    Flowsheet Row Most Recent Value   Initial Alcohol Screen: US AUDIT-C     1  How often do you have a drink containing alcohol? 0 Filed at: 04/29/2023 2005   2  How many drinks containing alcohol do you have on a typical day you are drinking? 0 Filed at: 04/29/2023 2005   3a  Male UNDER 65:  How often do you have five or more drinks on one occasion? 0 Filed at: 04/29/2023 2005   3b  FEMALE Any Age, or MALE 65+: How often do you have 4 or more drinks on one occassion? 0 Filed at: 04/29/2023 2005   Audit-C Score 0 Filed at: 04/29/2023 2005   JONI: How many times in the past year have you    Used an illegal drug or used a prescription medication for non-medical reasons? Never Filed at: 04/29/2023 2005                    Medical Decision Making  66-year-old male presents for evaluation of persistent cough  Exam: Patient in NAD, dry cough noted throughout exam, vitals WNL; trace crackles heard in right lower lung fields, good air movement in all fields; heart RRR  Work-up: CXR  Will not conduct covid/flu testing as initial symptoms began 10 days ago  Possible small consolidation in right lower lung fields, unlikely to represent pneumonia considering normal vitals, afebrile, has been taking his prescribed antibiotics  However, will advise patient to continue his Augmentin course until finished in case there is any lingering infection  Persistent cough is likely due to bronchitis secondary to prior viral infection  Educated patient on condition and expectations, will provide Tessalon Perles for additional relief, recommend staying well-hydrated, return to emergency department for worsening shortness of breath or fevers  Patient expresses understanding of the condition, treatment plan, follow-up instructions, and return precautions  Amount and/or Complexity of Data Reviewed  Radiology: ordered  Risk  Prescription drug management            Disposition  Final diagnoses:   Cough     Time reflects when diagnosis was documented in both MDM as applicable and the Disposition within this note     Time User Action Codes Description Comment    4/29/2023  9:05 PM Mookie Love Bronchitis     4/29/2023  9:06 PM Lenora Keys Bronchitis     4/29/2023  9:06 PM Afshin Sheth Add [R05 9] Cough       ED Disposition     ED Disposition   Discharge    Condition   Stable    Date/Time   Sat Apr 29, 2023  9:06 PM    Comment   Ren Sharp discharge to home/self care  Follow-up Information     Follow up With Specialties Details Why Contact Info    Lanre Martinez MD Internal Medicine   [facility] [place] [address]            Discharge Medication List as of 4/29/2023  9:09 PM      START taking these medications    Details   benzonatate (TESSALON PERLES) 100 mg capsule Take 1 capsule (100 mg total) by mouth every 8 (eight) hours, Starting Sat 4/29/2023, Normal         CONTINUE these medications which have NOT CHANGED    Details   amLODIPine (NORVASC) 10 mg tablet daily, Starting Sat 9/1/2018, Historical Med      metFORMIN (GLUCOPHAGE) 500 mg tablet Take 250 mg by mouth daily , Historical Med      Omega-3 Fatty Acids (OMEGA-3 FISH OIL) 1000 MG CAPS Take by mouth, Historical Med      oxyCODONE-acetaminophen (Percocet) 5-325 mg per tablet Take 1 tablet by mouth every 6 (six) hours as needed for severe pain Max Daily Amount: 4 tablets, Starting Fri 10/7/2022, Normal      sertraline (ZOLOFT) 100 mg tablet daily, Starting Mon 9/24/2018, Historical Med      simvastatin (ZOCOR) 5 MG tablet daily, Starting Wed 9/26/2018, Historical Med      sotalol (BETAPACE) 120 mg tablet 2 (two) times a day, Starting Wed 6/19/2019, Historical Med      spironolactone (ALDACTONE) 50 mg tablet Take 50 mg by mouth 2 (two) times a day, Starting Wed 12/4/2019, Historical Med      XARELTO 20 MG tablet daily, Starting Thu 8/9/2018, Historical Med             No discharge procedures on file      PDMP Review     None          ED Provider  Electronically Signed by           Romero Barr PA-C  04/29/23 [id number]

## 2023-04-30 NOTE — DISCHARGE INSTRUCTIONS
-Your symptoms are likely sequelae of a viral infection  Cough after upper respiratory infections can notoriously last a long time, upwards of several weeks  You can try Tessalon Perles a few times each day for relief  Return to the ED if you develop worsening fever or difficulty breathing   -Your chest x-ray showed a very small area of consolidation  It is unlikely to be pneumonia, but I'd recommend finishing your antibiotics just in case there is lingering bacteria

## 2023-07-04 ENCOUNTER — HOSPITAL ENCOUNTER (EMERGENCY)
Facility: HOSPITAL | Age: 73
Discharge: HOME | End: 2023-07-04
Attending: EMERGENCY MEDICINE
Payer: MEDICARE

## 2023-07-04 ENCOUNTER — APPOINTMENT (EMERGENCY)
Dept: CT IMAGING | Facility: HOSPITAL | Age: 73
End: 2023-07-04
Payer: MEDICARE

## 2023-07-04 VITALS
DIASTOLIC BLOOD PRESSURE: 64 MMHG | OXYGEN SATURATION: 94 % | SYSTOLIC BLOOD PRESSURE: 130 MMHG | BODY MASS INDEX: 29.56 KG/M2 | RESPIRATION RATE: 16 BRPM | WEIGHT: 188.71 LBS | HEART RATE: 63 BPM | TEMPERATURE: 98.6 F

## 2023-07-04 DIAGNOSIS — K57.92 DIVERTICULITIS: Primary | ICD-10-CM

## 2023-07-04 LAB
ALBUMIN SERPL BCP-MCNC: 4.5 G/DL (ref 3.5–5)
ALP SERPL-CCNC: 61 U/L (ref 34–104)
ALT SERPL W P-5'-P-CCNC: 23 U/L (ref 7–52)
ANION GAP SERPL CALCULATED.3IONS-SCNC: 8 MMOL/L
AST SERPL W P-5'-P-CCNC: 15 U/L (ref 13–39)
BASOPHILS # BLD AUTO: 0.07 THOUSANDS/ÂΜL (ref 0–0.1)
BASOPHILS NFR BLD AUTO: 1 % (ref 0–1)
BILIRUB SERPL-MCNC: 1.39 MG/DL (ref 0.2–1)
BUN SERPL-MCNC: 26 MG/DL (ref 5–25)
CALCIUM SERPL-MCNC: 10.1 MG/DL (ref 8.4–10.2)
CHLORIDE SERPL-SCNC: 100 MMOL/L (ref 96–108)
CO2 SERPL-SCNC: 26 MMOL/L (ref 21–32)
CREAT SERPL-MCNC: 1.62 MG/DL (ref 0.6–1.3)
EOSINOPHIL # BLD AUTO: 0.2 THOUSAND/ÂΜL (ref 0–0.61)
EOSINOPHIL NFR BLD AUTO: 1 % (ref 0–6)
ERYTHROCYTE [DISTWIDTH] IN BLOOD BY AUTOMATED COUNT: 13.7 % (ref 11.6–15.1)
GFR SERPL CREATININE-BSD FRML MDRD: 41 ML/MIN/1.73SQ M
GLUCOSE SERPL-MCNC: 135 MG/DL (ref 65–140)
HCT VFR BLD AUTO: 43.7 % (ref 36.5–49.3)
HGB BLD-MCNC: 14.4 G/DL (ref 12–17)
IMM GRANULOCYTES # BLD AUTO: 0.06 THOUSAND/UL (ref 0–0.2)
IMM GRANULOCYTES NFR BLD AUTO: 0 % (ref 0–2)
LIPASE SERPL-CCNC: 23 U/L (ref 11–82)
LYMPHOCYTES # BLD AUTO: 1.89 THOUSANDS/ÂΜL (ref 0.6–4.47)
LYMPHOCYTES NFR BLD AUTO: 13 % (ref 14–44)
MCH RBC QN AUTO: 28.7 PG (ref 26.8–34.3)
MCHC RBC AUTO-ENTMCNC: 33 G/DL (ref 31.4–37.4)
MCV RBC AUTO: 87 FL (ref 82–98)
MONOCYTES # BLD AUTO: 1.06 THOUSAND/ÂΜL (ref 0.17–1.22)
MONOCYTES NFR BLD AUTO: 7 % (ref 4–12)
NEUTROPHILS # BLD AUTO: 11.71 THOUSANDS/ÂΜL (ref 1.85–7.62)
NEUTS SEG NFR BLD AUTO: 78 % (ref 43–75)
NRBC BLD AUTO-RTO: 0 /100 WBCS
PLATELET # BLD AUTO: 306 THOUSANDS/UL (ref 149–390)
PMV BLD AUTO: 8.5 FL (ref 8.9–12.7)
POTASSIUM SERPL-SCNC: 4.7 MMOL/L (ref 3.5–5.3)
PROT SERPL-MCNC: 8.2 G/DL (ref 6.4–8.4)
RBC # BLD AUTO: 5.01 MILLION/UL (ref 3.88–5.62)
SODIUM SERPL-SCNC: 134 MMOL/L (ref 135–147)
WBC # BLD AUTO: 14.99 THOUSAND/UL (ref 4.31–10.16)

## 2023-07-04 PROCEDURE — 85025 COMPLETE CBC W/AUTO DIFF WBC: CPT

## 2023-07-04 PROCEDURE — G1004 CDSM NDSC: HCPCS

## 2023-07-04 PROCEDURE — 36415 COLL VENOUS BLD VENIPUNCTURE: CPT

## 2023-07-04 PROCEDURE — 83690 ASSAY OF LIPASE: CPT

## 2023-07-04 PROCEDURE — 74177 CT ABD & PELVIS W/CONTRAST: CPT

## 2023-07-04 PROCEDURE — 80053 COMPREHEN METABOLIC PANEL: CPT

## 2023-07-04 RX ORDER — AMOXICILLIN AND CLAVULANATE POTASSIUM 875; 125 MG/1; MG/1
1 TABLET, FILM COATED ORAL EVERY 8 HOURS
Qty: 42 TABLET | Refills: 0 | Status: SHIPPED | OUTPATIENT
Start: 2023-07-04 | End: 2023-07-04 | Stop reason: SDUPTHER

## 2023-07-04 RX ORDER — ONDANSETRON 2 MG/ML
4 INJECTION INTRAMUSCULAR; INTRAVENOUS ONCE
Status: COMPLETED | OUTPATIENT
Start: 2023-07-04 | End: 2023-07-04

## 2023-07-04 RX ORDER — AMOXICILLIN AND CLAVULANATE POTASSIUM 875; 125 MG/1; MG/1
1 TABLET, FILM COATED ORAL EVERY 8 HOURS
Qty: 42 TABLET | Refills: 0 | Status: SHIPPED | OUTPATIENT
Start: 2023-07-04 | End: 2023-07-18

## 2023-07-04 RX ORDER — MORPHINE SULFATE 4 MG/ML
4 INJECTION, SOLUTION INTRAMUSCULAR; INTRAVENOUS ONCE
Status: COMPLETED | OUTPATIENT
Start: 2023-07-04 | End: 2023-07-04

## 2023-07-04 RX ORDER — AMOXICILLIN AND CLAVULANATE POTASSIUM 875; 125 MG/1; MG/1
1 TABLET, FILM COATED ORAL ONCE
Status: COMPLETED | OUTPATIENT
Start: 2023-07-04 | End: 2023-07-04

## 2023-07-04 RX ADMIN — ONDANSETRON 4 MG: 2 INJECTION INTRAMUSCULAR; INTRAVENOUS at 01:29

## 2023-07-04 RX ADMIN — MORPHINE SULFATE 4 MG: 4 INJECTION INTRAVENOUS at 01:30

## 2023-07-04 RX ADMIN — IOHEXOL 100 ML: 350 INJECTION, SOLUTION INTRAVENOUS at 04:00

## 2023-07-04 RX ADMIN — AMOXICILLIN AND CLAVULANATE POTASSIUM 1 TABLET: 875; 125 TABLET, FILM COATED ORAL at 04:44

## 2023-07-04 NOTE — ED ATTENDING ATTESTATION
7/4/2023  IPriyank DO, saw and evaluated the patient. I have discussed the patient with the resident/non-physician practitioner and agree with the resident's/non-physician practitioner's findings, Plan of Care, and MDM as documented in the resident's/non-physician practitioner's note, except where noted. All available labs and Radiology studies were reviewed. I was present for key portions of any procedure(s) performed by the resident/non-physician practitioner and I was immediately available to provide assistance. At this point I agree with the current assessment done in the Emergency Department. I have conducted an independent evaluation of this patient a history and physical is as follows: I saw and evaluated the patient. I reviewed the resident's note and agree. Cory Appiah is a 67 y.o. male who presents with Abdominal Pain (Sharp abd pain since yesterday. Some nausea. Denies v/d. No medication for pain)    has a past medical history of Atrial fibrillation ([address]), Benign localized prostatic hyperplasia with lower urinary tract symptoms (LUTS), Bilateral hydrocele, Hyperlipidemia, Hypertension, Other specified depressive episodes, and Type 2 diabetes mellitus ([address]). .     VS are as follows /64 (BP Location: Right arm)   Pulse 63   Temp 98.6 °F (37 °C) (Oral)   Resp 16   Wt 85.6 kg (188 lb 11.4 oz)   SpO2 94%   BMI 29.56 kg/m² .        Lab Results:    Labs Reviewed   CBC AND DIFFERENTIAL - Abnormal       Result Value Ref Range Status    WBC 14.99 (*) 4.31 - 10.16 Thousand/uL Final    RBC 5.01  3.88 - 5.62 Million/uL Final    Hemoglobin 14.4  12.0 - 17.0 g/dL Final    Hematocrit 43.7  36.5 - 49.3 % Final    MCV 87  82 - 98 fL Final    MCH 28.7  26.8 - 34.3 pg Final    MCHC 33.0  31.4 - 37.4 g/dL Final    RDW 13.7  11.6 - 15.1 % Final    MPV 8.5 (*) 8.9 - 12.7 fL Final    Platelets 467  894 - 390 Thousands/uL Final    nRBC 0  /100 WBCs Final    Neutrophils Relative 78 (*) 43 - 75 % Final    Immat GRANS % 0  0 - 2 % Final    Lymphocytes Relative 13 (*) 14 - 44 % Final    Monocytes Relative 7  4 - 12 % Final    Eosinophils Relative 1  0 - 6 % Final    Basophils Relative 1  0 - 1 % Final    Neutrophils Absolute 11.71 (*) 1.85 - 7.62 Thousands/µL Final    Immature Grans Absolute 0.06  0.00 - 0.20 Thousand/uL Final    Lymphocytes Absolute 1.89  0.60 - 4.47 Thousands/µL Final    Monocytes Absolute 1.06  0.17 - 1.22 Thousand/µL Final    Eosinophils Absolute 0.20  0.00 - 0.61 Thousand/µL Final    Basophils Absolute 0.07  0.00 - 0.10 Thousands/µL Final   COMPREHENSIVE METABOLIC PANEL - Abnormal    Sodium 134 (*) 135 - 147 mmol/L Final    Potassium 4.7  3.5 - 5.3 mmol/L Final    Chloride 100  96 - 108 mmol/L Final    CO2 26  21 - 32 mmol/L Final    ANION GAP 8  mmol/L Final    BUN 26 (*) 5 - 25 mg/dL Final    Creatinine 1.62 (*) 0.60 - 1.30 mg/dL Final    Comment: Standardized to IDMS reference method    Glucose 135  65 - 140 mg/dL Final    Comment: If the patient is fasting, the ADA then defines impaired fasting glucose as > 100 mg/dL and diabetes as > or equal to 123 mg/dL. Calcium 10.1  8.4 - 10.2 mg/dL Final    AST 15  13 - 39 U/L Final    ALT 23  7 - 52 U/L Final    Comment: Specimen collection should occur prior to Sulfasalazine administration due to the potential for falsely depressed results. Alkaline Phosphatase 61  34 - 104 U/L Final    Total Protein 8.2  6.4 - 8.4 g/dL Final    Albumin 4.5  3.5 - 5.0 g/dL Final    Total Bilirubin 1.39 (*) 0.20 - 1.00 mg/dL Final    Comment: Use of this assay is not recommended for patients undergoing treatment with eltrombopag due to the potential for falsely elevated results. N-acetyl-p-benzoquinone imine (metabolite of Acetaminophen) will generate erroneously low results in samples for patients that have taken an overdose of Acetaminophen.     eGFR 41  ml/min/1.73sq m Final    Narrative:     [place] guidelines for Chronic Kidney Disease (CKD):   •  Stage 1 with normal or high GFR (GFR > 90 mL/min/1.73 square meters)  •  Stage 2 Mild CKD (GFR = 60-89 mL/min/1.73 square meters)  •  Stage 3A Moderate CKD (GFR = 45-59 mL/min/1.73 square meters)  •  Stage 3B Moderate CKD (GFR = 30-44 mL/min/1.73 square meters)  •  Stage 4 Severe CKD (GFR = 15-29 mL/min/1.73 square meters)  •  Stage 5 End Stage CKD (GFR <15 mL/min/1.73 square meters)  Note: GFR calculation is accurate only with a steady state creatinine   LIPASE - Normal    Lipase 23  11 - 82 u/L Final         Imaging:   CT abdomen pelvis with contrast   Final Result      Findings consistent with acute uncomplicated sigmoid diverticulitis with reactive inflammatory changes of the left bladder dome. Indeterminate 2.5 cm lesion at the left kidney is also described on outside CT examinations from 10/13/2021 and is presumed to represent a hemorrhagic or proteinaceous            Workstation performed: [id number]             Work up and treatment plan discussed with Treatment Team: Registered Nurse: Adolfo Arcos; Resident: Paris Ibarra MD and agreed upon plan. Final Diagnosis:  1. Diverticulitis            MDM  ED Course as of 07/04/23 0450   Tue Jul 04, 2023   0309 Comprehensive metabolic panel(!)  CKD. Around his current baseline   0309 CBC and differential(!)  Leukocytosis      Intermittent abdominal pain. We will obtain abdominal labs, CT scan and treat supportively with morphine, Zofran and continued reevaluation    CT shows acute uncomplicated diverticulitis. Patient doing well here. We will treat supportively with Augmentin and strict return ER precautions.     Final diagnoses:   Diverticulitis         Disposition    Home/Self Care         New Prescriptions:    Discharge Medication List as of 7/4/2023  4:30 AM      START taking these medications    Details   amoxicillin-clavulanate (AUGMENTIN) 875-125 mg per tablet Take 1 tablet by mouth every 8 (eight) hours for 14 days, Starting Tue 7/4/2023, Until Tue 7/18/2023, Normal                  Follow-up Instructions:    Juanita Murdock MD  [address]    In 1 week      [facility] [address].  [facility] [address] [facility],[address] [id number]  [phone]          ED Course  ED Course as of 07/04/23 0450   Tue Jul 04, 2023   0309 Comprehensive metabolic panel(!)  CKD.   Around his current baseline   0309 CBC and differential(!)  Leukocytosis         Critical Care Time  Procedures

## 2023-07-04 NOTE — ED PROVIDER NOTES
History  Chief Complaint   Patient presents with   • Abdominal Pain     Sharp abd pain since yesterday. Some nausea. Denies v/d. No medication for pain     HPI  Luh Tuttle is a 67 y.o. male who presents to the emergency department with abdominal pain for 2 days. He has had nausea without vomiting or diarrhea, he denies blood in the stool. He has a prior history of diverticulitis. Prior to Admission Medications   Prescriptions Last Dose Informant Patient Reported? Taking?    Omega-3 Fatty Acids (OMEGA-3 FISH OIL) 1000 MG CAPS  Self Yes Yes   Sig: Take by mouth   XARELTO 20 MG tablet  Self Yes Yes   Sig: daily   amLODIPine (NORVASC) 10 mg tablet  Self Yes Yes   Sig: 10 mg 2 (two) times a day   benzonatate (TESSALON PERLES) 100 mg capsule Not Taking  No No   Sig: Take 1 capsule (100 mg total) by mouth every 8 (eight) hours   Patient not taking: Reported on 2023   metFORMIN (GLUCOPHAGE) 500 mg tablet  Self Yes Yes   Sig: Take 1,000 mg by mouth 2 (two) times a day   oxyCODONE-acetaminophen (Percocet) 5-325 mg per tablet Not Taking  No No   Sig: Take 1 tablet by mouth every 6 (six) hours as needed for severe pain Max Daily Amount: 4 tablets   Patient not taking: Reported on 11/3/2022   sertraline (ZOLOFT) 100 mg tablet Not Taking Self Yes No   Sig: daily   Patient not taking: Reported on 2023   simvastatin (ZOCOR) 5 MG tablet  Self Yes Yes   Sig: Take 10 mg by mouth daily   sotalol (BETAPACE) 120 mg tablet  Self Yes Yes   Si (two) times a day   spironolactone (ALDACTONE) 50 mg tablet  Self Yes Yes   Sig: Take 25 mg by mouth daily      Facility-Administered Medications: None       Past Medical History:   Diagnosis Date   • Atrial fibrillation (HCC)    • Benign localized prostatic hyperplasia with lower urinary tract symptoms (LUTS)    • Bilateral hydrocele    • Hyperlipidemia    • Hypertension    • Other specified depressive episodes    • Type 2 diabetes mellitus ([address])        Past Surgical History: Procedure Laterality Date   • ATRIAL CARDIAC PACEMAKER INSERTION     • CARDIAC SURGERY  10/2018    Ablation   • CARPAL TUNNEL RELEASE Bilateral    • CATARACT EXTRACTION, BILATERAL Bilateral    • CYSTOSCOPY  2016   • HERNIA REPAIR Bilateral    • HYDROCELE EXCISION / REPAIR Bilateral 2016   • THROAT SURGERY     • THUMB ARTHROSCOPY Right 2016       Family History   Problem Relation Age of Onset   • Heart disease Mother    • Diabetes Sister      I have reviewed and agree with the history as documented. E-Cigarette/Vaping   • E-Cigarette Use Never User      E-Cigarette/Vaping Substances     Social History     Tobacco Use   • Smoking status: Never   • Smokeless tobacco: Never   Vaping Use   • Vaping Use: Never used   Substance Use Topics   • Alcohol use: No   • Drug use: No       Home medications:  Prior to Admission Medications   Prescriptions Last Dose Informant Patient Reported? Taking?    Omega-3 Fatty Acids (OMEGA-3 FISH OIL) 1000 MG CAPS  Self Yes Yes   Sig: Take by mouth   XARELTO 20 MG tablet  Self Yes Yes   Sig: daily   amLODIPine (NORVASC) 10 mg tablet  Self Yes Yes   Sig: 10 mg 2 (two) times a day   benzonatate (TESSALON PERLES) 100 mg capsule Not Taking  No No   Sig: Take 1 capsule (100 mg total) by mouth every 8 (eight) hours   Patient not taking: Reported on 2023   metFORMIN (GLUCOPHAGE) 500 mg tablet  Self Yes Yes   Sig: Take 1,000 mg by mouth 2 (two) times a day   oxyCODONE-acetaminophen (Percocet) 5-325 mg per tablet Not Taking  No No   Sig: Take 1 tablet by mouth every 6 (six) hours as needed for severe pain Max Daily Amount: 4 tablets   Patient not taking: Reported on 11/3/2022   sertraline (ZOLOFT) 100 mg tablet Not Taking Self Yes No   Sig: daily   Patient not taking: Reported on 2023   simvastatin (ZOCOR) 5 MG tablet  Self Yes Yes   Sig: Take 10 mg by mouth daily   sotalol (BETAPACE) 120 mg tablet  Self Yes Yes   Si (two) times a day   spironolactone (ALDACTONE) 50 mg tablet  Self Yes Yes   Sig: Take 25 mg by mouth daily      Facility-Administered Medications: None     Allergies:  No Known Allergies     Review of Systems   Constitutional: Negative for fever. Respiratory: Negative for shortness of breath. Cardiovascular: Negative for chest pain. Gastrointestinal: Positive for abdominal pain and nausea. Negative for blood in stool and vomiting. Genitourinary: Negative for dysuria. All other systems reviewed and are negative. Physical Exam  ED Triage Vitals   Temperature Pulse Respirations Blood Pressure SpO2   07/04/23 0046 07/04/23 0044 07/04/23 0044 07/04/23 0044 07/04/23 0044   98.6 °F (37 °C) 87 20 154/81 98 %      Temp Source Heart Rate Source Patient Position - Orthostatic VS BP Location FiO2 (%)   07/04/23 0046 07/04/23 0044 07/04/23 0044 07/04/23 0044 --   Oral Monitor Sitting Right arm       Pain Score       07/04/23 0130       9             Orthostatic Vital Signs  Vitals:    07/04/23 0044 07/04/23 0330   BP: 154/81 130/64   Pulse: 87 63   Patient Position - Orthostatic VS: Sitting Lying       Physical Exam  Vitals and nursing note reviewed. Constitutional:       General: He is not in acute distress. Appearance: He is not toxic-appearing. HENT:      Head: Normocephalic. Mouth/Throat:      Mouth: Mucous membranes are moist.   Eyes:      Pupils: Pupils are equal, round, and reactive to light. Cardiovascular:      Rate and Rhythm: Normal rate and regular rhythm. Heart sounds: No murmur heard. Pulmonary:      Effort: Pulmonary effort is normal. No respiratory distress. Breath sounds: No wheezing, rhonchi or rales. Abdominal:      General: Abdomen is flat. There is no distension. Palpations: Abdomen is soft. Tenderness: There is abdominal tenderness in the periumbilical area. There is no guarding or rebound. Negative signs include Burt's sign and McBurney's sign. Skin:     General: Skin is warm and dry. Neurological:      Mental Status: He is alert.          ED Medications  Medications   ondansetron (ZOFRAN) injection 4 mg (4 mg Intravenous Given 7/4/23 0129)   morphine injection 4 mg (4 mg Intravenous Given 7/4/23 0130)   iohexol (OMNIPAQUE) 350 MG/ML injection (SINGLE-DOSE) 100 mL (100 mL Intravenous Given 7/4/23 0400)   amoxicillin-clavulanate (AUGMENTIN) 875-125 mg per tablet 1 tablet (1 tablet Oral Given 7/4/23 0444)       Diagnostic Studies  Results Reviewed     Procedure Component Value Units Date/Time    Comprehensive metabolic panel [[id number]]  (Abnormal) Collected: 07/04/23 0133    Lab Status: Final result Specimen: Blood from Arm, Right Updated: 07/04/23 0155     Sodium 134 mmol/L      Potassium 4.7 mmol/L      Chloride 100 mmol/L      CO2 26 mmol/L      ANION GAP 8 mmol/L      BUN 26 mg/dL      Creatinine 1.62 mg/dL      Glucose 135 mg/dL      Calcium 10.1 mg/dL      AST 15 U/L      ALT 23 U/L      Alkaline Phosphatase 61 U/L      Total Protein 8.2 g/dL      Albumin 4.5 g/dL      Total Bilirubin 1.39 mg/dL      eGFR 41 ml/min/1.73sq m     Narrative:      [place] guidelines for Chronic Kidney Disease (CKD):   •  Stage 1 with normal or high GFR (GFR > 90 mL/min/1.73 square meters)  •  Stage 2 Mild CKD (GFR = 60-89 mL/min/1.73 square meters)  •  Stage 3A Moderate CKD (GFR = 45-59 mL/min/1.73 square meters)  •  Stage 3B Moderate CKD (GFR = 30-44 mL/min/1.73 square meters)  •  Stage 4 Severe CKD (GFR = 15-29 mL/min/1.73 square meters)  •  Stage 5 End Stage CKD (GFR <15 mL/min/1.73 square meters)  Note: GFR calculation is accurate only with a steady state creatinine    Lipase [[id number]]  (Normal) Collected: 07/04/23 0133    Lab Status: Final result Specimen: Blood from Arm, Right Updated: 07/04/23 0155     Lipase 23 u/L     CBC and differential [[id number]]  (Abnormal) Collected: 07/04/23 0133    Lab Status: Final result Specimen: Blood from Arm, Right Updated: 07/04/23 0141 WBC 14.99 Thousand/uL      RBC 5.01 Million/uL      Hemoglobin 14.4 g/dL      Hematocrit 43.7 %      MCV 87 fL      MCH 28.7 pg      MCHC 33.0 g/dL      RDW 13.7 %      MPV 8.5 fL      Platelets 150 Thousands/uL      nRBC 0 /100 WBCs      Neutrophils Relative 78 %      Immat GRANS % 0 %      Lymphocytes Relative 13 %      Monocytes Relative 7 %      Eosinophils Relative 1 %      Basophils Relative 1 %      Neutrophils Absolute 11.71 Thousands/µL      Immature Grans Absolute 0.06 Thousand/uL      Lymphocytes Absolute 1.89 Thousands/µL      Monocytes Absolute 1.06 Thousand/µL      Eosinophils Absolute 0.20 Thousand/µL      Basophils Absolute 0.07 Thousands/µL                  CT abdomen pelvis with contrast   Final Result by Jesse Shane MD (07/04 9457)      Findings consistent with acute uncomplicated sigmoid diverticulitis with reactive inflammatory changes of the left bladder dome. Indeterminate 2.5 cm lesion at the left kidney is also described on outside CT examinations from 10/13/2021 and is presumed to represent a hemorrhagic or proteinaceous            Workstation performed: [id number]               Procedures  Procedures      ED Course                                       MDM  MDM  Myla Gerard is a 67 y.o. male who presents to the emergency department with abdominal pain. Workup including vital signs, physical exam, labs and CT. CT with evidence of diverticulitis. Plan for treatment with course of Augmentin. Stable for discharge home with primary care follow up, discharge instructions and return precautions given.        Disposition  Final diagnoses:   Diverticulitis     Time reflects when diagnosis was documented in both MDM as applicable and the Disposition within this note     Time User Action Codes Description Comment    7/4/2023  4:26 AM Maurice Banda Add [K57.92] Diverticulitis       ED Disposition     ED Disposition   Discharge    Condition   Stable    Date/Time   Tue Jul 4, 2023  4:29 AM    Otilia   Nupur Tellez discharge to home/self care.                Follow-up Information     Follow up With Specialties Details Why Contact Info Additional Information    Marti Bolton MD Internal Medicine In 1 week  [address] [address]  [phone]       [facility]   [address]  [facility] [address] [facility],[address] [id number] [address] [facility], [address] [facility] [place] [address], [facility] [place], [place], [address] [phone]          Discharge Medication List as of 7/4/2023  4:30 AM      START taking these medications    Details   amoxicillin-clavulanate (AUGMENTIN) 875-125 mg per tablet Take 1 tablet by mouth every 8 (eight) hours for 14 days, Starting Tue 7/4/2023, Until Tue 7/18/2023, Normal         CONTINUE these medications which have NOT CHANGED    Details   amLODIPine (NORVASC) 10 mg tablet 10 mg 2 (two) times a day, Starting Sat 9/1/2018, Historical Med      metFORMIN (GLUCOPHAGE) 500 mg tablet Take 1,000 mg by mouth 2 (two) times a day, Historical Med      Omega-3 Fatty Acids (OMEGA-3 FISH OIL) 1000 MG CAPS Take by mouth, Historical Med      simvastatin (ZOCOR) 5 MG tablet Take 10 mg by mouth daily, Starting Wed 9/26/2018, Historical Med      sotalol (BETAPACE) 120 mg tablet 2 (two) times a day, Starting Wed 6/19/2019, Historical Med      spironolactone (ALDACTONE) 50 mg tablet Take 25 mg by mouth daily, Starting Wed 12/4/2019, Historical Med      XARELTO 20 MG tablet daily, Starting Thu 8/9/2018, Historical Med      benzonatate (TESSALON PERLES) 100 mg capsule Take 1 capsule (100 mg total) by mouth every 8 (eight) hours, Starting Sat 4/29/2023, Normal      oxyCODONE-acetaminophen (Percocet) 5-325 mg per tablet Take 1 tablet by mouth every 6 (six) hours as needed for severe pain Max Daily Amount: 4 tablets, Starting Fri 10/7/2022, Normal      sertraline (ZOLOFT) 100 mg tablet daily, Starting Mon 9/24/2018, Historical Med                 PDMP Review     None           ED Provider  Attending physically available and evaluated Elizabeth Pa. I managed the patient along with the ED Attending. Electronically Signed by    Portions of the record may have been created with voice recognition software. Occasional wrong word or "sound a like" substitutions may have occurred due to the inherent limitations of voice recognition software.   Read the chart carefully and recognize, using context, where substitutions have occurred     Margarito Garcia MD  07/04/23 3746

## 2023-07-04 NOTE — DISCHARGE INSTRUCTIONS
CT scan showed diverticulitis, a prescription for antibiotics was sent to the pharmacy. CT scan also showed a kidney cyst, you should follow-up with your primary care doctor. Return to the emergency department if symptoms worsen or if you have any other concerns.

## 2023-10-19 NOTE — PROGRESS NOTES
Assessment/Plan:    Overactive bladder  After short term success with tamsulosin for treatment of the patient's BPH symptoms, he now presents with frequency, urgency and voiding small amounts  The postvoid residual today of 89 mL is actually a 1 hour postvoid residual   The symptoms now are compatible with overactive bladder  He will be given a trial of oxybutynin in addition to the tamsulosin  He will return in 1 month p otis [place] Diagnoses and all orders for this visit:    Frequency of urination  -     POCT Measure PVR  -     oxybutynin (DITROPAN) 5 mg tablet; Take 1 tablet (5 mg total) by mouth 3 (three) times a day    Overactive bladder    BPH with obstruction/lower urinary tract symptoms          Subjective:      Patient ID: Ralph Roger is a 76 y o  male  HPI  BPH:  He notes urinary frequency and nocturia x 3 or more  He denies other significant urinary symptoms  He denies gross hematuria, urinary tract infections or incontinence  He is taking tamsulosin for his symptoms  Stream is excellent  Sx interfere with ADL's and annoy wife  The patient was placed on tamsulosin on to October 29, 2018  He reported that his symptoms improved significantly on November 28, 2018  Now his symptoms are as bad as they ever were  No change in meds  No cold remedies  PVR=89 cc  This is a 1-hour PVR  PSA:  0 72 on April 24, 2018  AUA SYMPTOM SCORE      Most Recent Value   AUA SYMPTOM SCORE   How often have you had a sensation of not emptying your bladder completely after you finished urinating? 5   How often have you had to urinate again less than two hours after you finished urinating? 5   How often have you found you stopped and started again several times when you urinate?  0   How often have you found it difficult to postpone urination? 4   How often have you had a weak urinary stream?  0   How often have you had to push or strain to begin urination?   0   How many times did you most typically get up to urinate from the time you went to bed at night until the time you got up in the morning? 3   Quality of Life: If you were to spend the rest of your life with your urinary condition just the way it is now, how would you feel about that?  3   AUA SYMPTOM SCORE  17        The patient refused to give urine sample, stating that his insurance will not pay for it      The following portions of the patient's history were reviewed and updated as appropriate: allergies, current medications, past family history, past medical history, past social history, past surgical history and problem list     Review of Systems    Constitutional: Negative for activity change and fatigue  Respiratory: Negative for shortness of breath and wheezing     Cardiovascular: Negative for chest pain         The patient had a successful cardioversion in the past   He is taking Xarelto   Still in and out of a fib   Had an ablation a few months ago  Hypertension    Gastrointestinal: Negative for abdominal pain  Endocrine:          Non-insulin-dependent diabetes    Genitourinary: Negative for difficulty urinating, dysuria, frequency, hematuria and urgency  Musculoskeletal: Negative for back pain and gait problem     Skin: Negative     Allergic/Immunologic: Negative     Neurological: Negative     Psychiatric/Behavioral: Negative     Objective:      /80 (BP Location: Left arm, Patient Position: Sitting, Cuff Size: Standard)   Pulse 60   Ht 5' 6" (1 676 m)   Wt 84 4 kg (186 lb)   BMI 30 02 kg/m²          Physical Exam Tetracycline Counseling: Patient counseled regarding possible photosensitivity and increased risk for sunburn.  Patient instructed to avoid sunlight, if possible.  When exposed to sunlight, patients should wear protective clothing, sunglasses, and sunscreen.  The patient was instructed to call the office immediately if the following severe adverse effects occur:  hearing changes, easy bruising/bleeding, severe headache, or vision changes.  The patient verbalized understanding of the proper use and possible adverse effects of tetracycline.  All of the patient's questions and concerns were addressed. Patient understands to avoid pregnancy while on therapy due to potential birth defects.

## 2024-03-05 ENCOUNTER — OFFICE VISIT (OUTPATIENT)
Dept: URGENT CARE | Facility: CLINIC | Age: 74
End: 2024-03-05
Payer: MEDICARE

## 2024-03-05 VITALS
TEMPERATURE: 96.2 F | WEIGHT: 185 LBS | DIASTOLIC BLOOD PRESSURE: 66 MMHG | OXYGEN SATURATION: 98 % | RESPIRATION RATE: 18 BRPM | HEART RATE: 74 BPM | SYSTOLIC BLOOD PRESSURE: 136 MMHG | BODY MASS INDEX: 28.98 KG/M2

## 2024-03-05 DIAGNOSIS — R05.1 ACUTE COUGH: Primary | ICD-10-CM

## 2024-03-05 PROCEDURE — G0463 HOSPITAL OUTPT CLINIC VISIT: HCPCS | Performed by: PHYSICIAN ASSISTANT

## 2024-03-05 PROCEDURE — 99213 OFFICE O/P EST LOW 20 MIN: CPT | Performed by: PHYSICIAN ASSISTANT

## 2024-03-05 RX ORDER — VENLAFAXINE HYDROCHLORIDE 150 MG/1
150 CAPSULE, EXTENDED RELEASE ORAL DAILY
COMMUNITY
Start: 2023-12-12

## 2024-03-05 RX ORDER — SILDENAFIL 50 MG/1
TABLET, FILM COATED ORAL
COMMUNITY
Start: 2024-02-28

## 2024-03-05 RX ORDER — BENZONATATE 100 MG/1
100 CAPSULE ORAL 3 TIMES DAILY PRN
Qty: 20 CAPSULE | Refills: 0 | Status: SHIPPED | OUTPATIENT
Start: 2024-03-05 | End: 2024-03-05 | Stop reason: CLARIF

## 2024-03-05 RX ORDER — BENZONATATE 100 MG/1
100 CAPSULE ORAL 3 TIMES DAILY PRN
Qty: 20 CAPSULE | Refills: 0 | Status: SHIPPED | OUTPATIENT
Start: 2024-03-05

## 2024-03-05 RX ORDER — TRAZODONE HYDROCHLORIDE 100 MG/1
100 TABLET ORAL EVERY EVENING
COMMUNITY
Start: 2023-12-12

## 2024-03-05 NOTE — PATIENT INSTRUCTIONS
Tessalon Perles to use as needed to help control cough.  Please note that the cough is not necessarily a bad thing but it is the body's way of trying to keep airways clear.  Stay well-hydrated.    Contact your primary care provider offices soon as possible to arrange follow-up for approximately 1 week for reevaluation.    If you would develop any severe weakness, chest pain, shortness of breath proceed immediately to emergency room for further evaluation.

## 2024-03-05 NOTE — PROGRESS NOTES
[facility]    NAME: Amrit Becerra is a 73 y.o. male  : 1950    MRN: [id number]  DATE: 2024  TIME: 5:38 PM    Assessment and Plan   Acute cough [R05.1]  1. Acute cough  benzonatate (TESSALON PERLES) 100 mg capsule    DISCONTINUED: benzonatate (TESSALON PERLES) 100 mg capsule          Patient Instructions     Patient Instructions   Tessalon Perles to use as needed to help control cough.  Please note that the cough is not necessarily a bad thing but it is the body's way of trying to keep airways clear.  Stay well-hydrated.    Contact your primary care provider offices soon as possible to arrange follow-up for approximately 1 week for reevaluation.    If you would develop any severe weakness, chest pain, shortness of breath proceed immediately to emergency room for further evaluation.    Chief Complaint     Chief Complaint   Patient presents with    Cold Like Symptoms     Patient with c/o dry cough       History of Present Illness   Amrit Becerra presents to the clinic c/o  73-year-old male comes in with complaint of cough and tickle in his throat.  Started late on Saturday.  Denies any nasal congestion, runny nose, postnasal drip, shortness of breath, chest pain, wheezing.  No fever or chills.  No malaise.    Was seen by his cardiologist today who checked his lungs and said they sounded good.    Patient is concerned because he says he has a history of bronchitis.  Non-smoker.  No history of asthma or pneumonia.        Review of Systems   Review of Systems   Constitutional: Negative.    HENT:  Negative for congestion, ear pain, postnasal drip, rhinorrhea, sneezing, sore throat and trouble swallowing.    Respiratory:  Positive for cough. Negative for chest tightness, shortness of breath, wheezing and stridor.    Cardiovascular:  Negative for chest pain and leg swelling.   Musculoskeletal:  Negative for myalgias.   Neurological:  Negative for dizziness and weakness.       Current Medications      Long-Term Medications   Medication Sig Dispense Refill    amLODIPine (NORVASC) 10 mg tablet 10 mg 2 (two) times a day      metFORMIN (GLUCOPHAGE) 500 mg tablet Take 1,000 mg by mouth 2 (two) times a day      mupirocin (BACTROBAN) 2 % ointment APPLY WITH EACH DRESSING CHANGE TWICE DAILY      Omega-3 Fatty Acids (OMEGA-3 FISH OIL) 1000 MG CAPS Take by mouth      sildenafil (VIAGRA) 50 MG tablet TAKE 1 TABLET BY MOUTH ONCE DAILY AS NEEDED APPROXIMATELY 1 HOUR BEFORE SEXUAL ACTIVITY      simvastatin (ZOCOR) 5 MG tablet Take 10 mg by mouth daily      sotalol (BETAPACE) 120 mg tablet 2 (two) times a day      spironolactone (ALDACTONE) 50 mg tablet Take 25 mg by mouth 2 (two) times a day  11    traZODone (DESYREL) 100 mg tablet Take 100 mg by mouth every evening      venlafaxine (EFFEXOR-XR) 150 mg 24 hr capsule Take 150 mg by mouth daily      XARELTO 20 MG tablet daily      sertraline (ZOLOFT) 100 mg tablet daily (Patient not taking: Reported on 7/4/2023)         Current Allergies     Allergies as of 03/05/2024 - Reviewed 03/05/2024   Allergen Reaction Noted    Valsartan Other (See Comments) 09/07/2023    Oxybutynin Palpitations 09/07/2023          The following portions of the patient's history were reviewed and updated as appropriate: allergies, current medications, past family history, past medical history, past social history, past surgical history and problem list.  Past Medical History:   Diagnosis Date    Atrial fibrillation (HCC)     Benign localized prostatic hyperplasia with lower urinary tract symptoms (LUTS)     Bilateral hydrocele     Hyperlipidemia     Hypertension     Other specified depressive episodes     Type 2 diabetes mellitus (HCC)      Past Surgical History:   Procedure Laterality Date    ATRIAL CARDIAC PACEMAKER INSERTION      CARDIAC SURGERY  10/2018    Ablation    CARPAL TUNNEL RELEASE Bilateral 1988    CATARACT EXTRACTION, BILATERAL Bilateral 2015    CYSTOSCOPY  07/2016    HERNIA REPAIR  Bilateral 2003    HYDROCELE EXCISION / REPAIR Bilateral 07/2016    THROAT SURGERY  1990    THUMB ARTHROSCOPY Right 2016     Family History   Problem Relation Age of Onset    Heart disease Mother     Diabetes Sister        Objective   /66   Pulse 74   Temp (!) 96.2 °F (35.7 °C) (Tympanic)   Resp 18   Wt 83.9 kg (185 lb)   SpO2 98%   BMI 28.98 kg/m²   No LMP for male patient.       Physical Exam     Physical Exam  Vitals and nursing note reviewed.   Constitutional:       General: He is not in acute distress.     Appearance: He is well-developed. He is not ill-appearing, toxic-appearing or diaphoretic.      Comments: Older gentleman with occasional dry cough.  No trismus or conversational dyspnea.   HENT:      Head: Normocephalic and atraumatic.      Right Ear: Tympanic membrane, ear canal and external ear normal.      Left Ear: Tympanic membrane, ear canal and external ear normal.      Nose: No congestion or rhinorrhea.      Mouth/Throat:      Mouth: Mucous membranes are moist.      Pharynx: No oropharyngeal exudate or posterior oropharyngeal erythema.   Eyes:      General: No scleral icterus.        Right eye: No discharge.         Left eye: No discharge.      Conjunctiva/sclera: Conjunctivae normal.      Pupils: Pupils are equal, round, and reactive to light.   Neck:      Trachea: No tracheal deviation.   Cardiovascular:      Rate and Rhythm: Normal rate and regular rhythm.      Heart sounds: Normal heart sounds. No murmur heard.     No friction rub. No gallop.   Pulmonary:      Effort: Pulmonary effort is normal. No respiratory distress.      Breath sounds: Normal breath sounds. No stridor. No wheezing, rhonchi or rales.   Musculoskeletal:      Cervical back: Normal range of motion and neck supple. No rigidity or tenderness.   Lymphadenopathy:      Cervical: No cervical adenopathy.   Skin:     General: Skin is warm and dry.      Findings: No rash.      Comments: No acute rashes   Neurological:       Mental Status: He is alert and oriented to person, place, and time.   Psychiatric:         Mood and Affect: Mood normal.         Behavior: Behavior normal.

## 2024-04-05 ENCOUNTER — APPOINTMENT (OUTPATIENT)
Dept: LAB | Facility: CLINIC | Age: 74
End: 2024-04-05

## 2024-04-05 DIAGNOSIS — Z11.1 SCREENING EXAMINATION FOR PULMONARY TUBERCULOSIS: ICD-10-CM

## 2024-04-05 DIAGNOSIS — Z01.84 IMMUNITY STATUS TESTING: ICD-10-CM

## 2024-04-05 LAB
MEV IGG SER QL IA: NORMAL
MUV IGG SER QL IA: NORMAL
RUBV IGG SERPL IA-ACNC: <10 IU/ML
VZV IGG SER QL IA: NORMAL

## 2024-04-05 PROCEDURE — 86787 VARICELLA-ZOSTER ANTIBODY: CPT

## 2024-04-05 PROCEDURE — 86480 TB TEST CELL IMMUN MEASURE: CPT

## 2024-04-05 PROCEDURE — 86735 MUMPS ANTIBODY: CPT

## 2024-04-05 PROCEDURE — 86765 RUBEOLA ANTIBODY: CPT

## 2024-04-05 PROCEDURE — 86762 RUBELLA ANTIBODY: CPT

## 2024-04-05 PROCEDURE — 36415 COLL VENOUS BLD VENIPUNCTURE: CPT

## 2024-04-06 LAB
GAMMA INTERFERON BACKGROUND BLD IA-ACNC: 0.04 IU/ML
M TB IFN-G BLD-IMP: NEGATIVE
M TB IFN-G CD4+ BCKGRND COR BLD-ACNC: 0.03 IU/ML
M TB IFN-G CD4+ BCKGRND COR BLD-ACNC: 0.03 IU/ML
MITOGEN IGNF BCKGRD COR BLD-ACNC: 9.96 IU/ML

## 2024-04-22 ENCOUNTER — HOSPITAL ENCOUNTER (EMERGENCY)
Facility: HOSPITAL | Age: 74
Discharge: HOME | End: 2024-04-22
Attending: EMERGENCY MEDICINE
Payer: MEDICARE

## 2024-04-22 ENCOUNTER — APPOINTMENT (EMERGENCY)
Dept: CT IMAGING | Facility: HOSPITAL | Age: 74
End: 2024-04-22
Payer: MEDICARE

## 2024-04-22 VITALS
RESPIRATION RATE: 12 BRPM | BODY MASS INDEX: 28.49 KG/M2 | WEIGHT: 181.88 LBS | DIASTOLIC BLOOD PRESSURE: 66 MMHG | OXYGEN SATURATION: 93 % | TEMPERATURE: 97.6 F | HEART RATE: 61 BPM | SYSTOLIC BLOOD PRESSURE: 123 MMHG

## 2024-04-22 DIAGNOSIS — K57.92 ACUTE DIVERTICULITIS: Primary | ICD-10-CM

## 2024-04-22 DIAGNOSIS — R93.429 ABNORMAL CT SCAN, KIDNEY: ICD-10-CM

## 2024-04-22 DIAGNOSIS — K65.4 MESENTERIC PANNICULITIS (HCC): ICD-10-CM

## 2024-04-22 LAB
ALBUMIN SERPL BCP-MCNC: 4.8 G/DL (ref 3.5–5)
ALP SERPL-CCNC: 51 U/L (ref 34–104)
ALT SERPL W P-5'-P-CCNC: 34 U/L (ref 7–52)
ANION GAP SERPL CALCULATED.3IONS-SCNC: 11 MMOL/L (ref 4–13)
APTT PPP: 35 SECONDS (ref 23–37)
AST SERPL W P-5'-P-CCNC: 19 U/L (ref 13–39)
BACTERIA UR QL AUTO: ABNORMAL /HPF
BASOPHILS # BLD AUTO: 0.08 THOUSANDS/ÂΜL (ref 0–0.1)
BASOPHILS NFR BLD AUTO: 1 % (ref 0–1)
BILIRUB SERPL-MCNC: 0.88 MG/DL (ref 0.2–1)
BILIRUB UR QL STRIP: NEGATIVE
BUN SERPL-MCNC: 27 MG/DL (ref 5–25)
CALCIUM SERPL-MCNC: 10.4 MG/DL (ref 8.4–10.2)
CHLORIDE SERPL-SCNC: 104 MMOL/L (ref 96–108)
CLARITY UR: CLEAR
CO2 SERPL-SCNC: 23 MMOL/L (ref 21–32)
COLOR UR: YELLOW
CREAT SERPL-MCNC: 1.58 MG/DL (ref 0.6–1.3)
EOSINOPHIL # BLD AUTO: 0.42 THOUSAND/ÂΜL (ref 0–0.61)
EOSINOPHIL NFR BLD AUTO: 4 % (ref 0–6)
ERYTHROCYTE [DISTWIDTH] IN BLOOD BY AUTOMATED COUNT: 12.8 % (ref 11.6–15.1)
GFR SERPL CREATININE-BSD FRML MDRD: 42 ML/MIN/1.73SQ M
GLUCOSE SERPL-MCNC: 139 MG/DL (ref 65–140)
GLUCOSE UR STRIP-MCNC: NEGATIVE MG/DL
HCT VFR BLD AUTO: 44.3 % (ref 36.5–49.3)
HGB BLD-MCNC: 14.9 G/DL (ref 12–17)
HGB UR QL STRIP.AUTO: NEGATIVE
HYALINE CASTS #/AREA URNS LPF: ABNORMAL /LPF
IMM GRANULOCYTES # BLD AUTO: 0.04 THOUSAND/UL (ref 0–0.2)
IMM GRANULOCYTES NFR BLD AUTO: 0 % (ref 0–2)
INR PPP: 1.26 (ref 0.84–1.19)
KETONES UR STRIP-MCNC: NEGATIVE MG/DL
LEUKOCYTE ESTERASE UR QL STRIP: ABNORMAL
LIPASE SERPL-CCNC: 62 U/L (ref 11–82)
LYMPHOCYTES # BLD AUTO: 1.83 THOUSANDS/ÂΜL (ref 0.6–4.47)
LYMPHOCYTES NFR BLD AUTO: 17 % (ref 14–44)
MCH RBC QN AUTO: 28.7 PG (ref 26.8–34.3)
MCHC RBC AUTO-ENTMCNC: 33.6 G/DL (ref 31.4–37.4)
MCV RBC AUTO: 85 FL (ref 82–98)
MONOCYTES # BLD AUTO: 0.71 THOUSAND/ÂΜL (ref 0.17–1.22)
MONOCYTES NFR BLD AUTO: 7 % (ref 4–12)
NEUTROPHILS # BLD AUTO: 7.48 THOUSANDS/ÂΜL (ref 1.85–7.62)
NEUTS SEG NFR BLD AUTO: 71 % (ref 43–75)
NITRITE UR QL STRIP: NEGATIVE
NON-SQ EPI CELLS URNS QL MICRO: ABNORMAL /HPF
NRBC BLD AUTO-RTO: 0 /100 WBCS
PH UR STRIP.AUTO: 6 [PH] (ref 4.5–8)
PLATELET # BLD AUTO: 319 THOUSANDS/UL (ref 149–390)
PMV BLD AUTO: 8.9 FL (ref 8.9–12.7)
POTASSIUM SERPL-SCNC: 4.4 MMOL/L (ref 3.5–5.3)
PROT SERPL-MCNC: 8.8 G/DL (ref 6.4–8.4)
PROT UR STRIP-MCNC: NEGATIVE MG/DL
PROTHROMBIN TIME: 16.1 SECONDS (ref 11.6–14.5)
RBC # BLD AUTO: 5.2 MILLION/UL (ref 3.88–5.62)
RBC #/AREA URNS AUTO: ABNORMAL /HPF
SODIUM SERPL-SCNC: 138 MMOL/L (ref 135–147)
SP GR UR STRIP.AUTO: 1.02 (ref 1–1.03)
UROBILINOGEN UR QL STRIP.AUTO: 0.2 E.U./DL
WBC # BLD AUTO: 10.56 THOUSAND/UL (ref 4.31–10.16)
WBC #/AREA URNS AUTO: ABNORMAL /HPF

## 2024-04-22 PROCEDURE — 96375 TX/PRO/DX INJ NEW DRUG ADDON: CPT

## 2024-04-22 PROCEDURE — 85610 PROTHROMBIN TIME: CPT | Performed by: EMERGENCY MEDICINE

## 2024-04-22 PROCEDURE — 96365 THER/PROPH/DIAG IV INF INIT: CPT

## 2024-04-22 PROCEDURE — 85025 COMPLETE CBC W/AUTO DIFF WBC: CPT | Performed by: EMERGENCY MEDICINE

## 2024-04-22 PROCEDURE — 99284 EMERGENCY DEPT VISIT MOD MDM: CPT

## 2024-04-22 PROCEDURE — 80053 COMPREHEN METABOLIC PANEL: CPT | Performed by: EMERGENCY MEDICINE

## 2024-04-22 PROCEDURE — 74177 CT ABD & PELVIS W/CONTRAST: CPT

## 2024-04-22 PROCEDURE — 99285 EMERGENCY DEPT VISIT HI MDM: CPT | Performed by: EMERGENCY MEDICINE

## 2024-04-22 PROCEDURE — 85730 THROMBOPLASTIN TIME PARTIAL: CPT | Performed by: EMERGENCY MEDICINE

## 2024-04-22 PROCEDURE — 36415 COLL VENOUS BLD VENIPUNCTURE: CPT | Performed by: EMERGENCY MEDICINE

## 2024-04-22 PROCEDURE — 83690 ASSAY OF LIPASE: CPT | Performed by: EMERGENCY MEDICINE

## 2024-04-22 PROCEDURE — 81001 URINALYSIS AUTO W/SCOPE: CPT

## 2024-04-22 PROCEDURE — 96376 TX/PRO/DX INJ SAME DRUG ADON: CPT

## 2024-04-22 RX ORDER — FENTANYL CITRATE 50 UG/ML
50 INJECTION, SOLUTION INTRAMUSCULAR; INTRAVENOUS ONCE
Status: COMPLETED | OUTPATIENT
Start: 2024-04-22 | End: 2024-04-22

## 2024-04-22 RX ORDER — AMOXICILLIN AND CLAVULANATE POTASSIUM 875; 125 MG/1; MG/1
1 TABLET, FILM COATED ORAL EVERY 12 HOURS
Qty: 14 TABLET | Refills: 0 | Status: SHIPPED | OUTPATIENT
Start: 2024-04-22 | End: 2024-04-29

## 2024-04-22 RX ORDER — AMOXICILLIN AND CLAVULANATE POTASSIUM 875; 125 MG/1; MG/1
1 TABLET, FILM COATED ORAL ONCE
Status: COMPLETED | OUTPATIENT
Start: 2024-04-22 | End: 2024-04-22

## 2024-04-22 RX ADMIN — FENTANYL CITRATE 50 MCG: 50 INJECTION INTRAMUSCULAR; INTRAVENOUS at 10:19

## 2024-04-22 RX ADMIN — IOHEXOL 100 ML: 350 INJECTION, SOLUTION INTRAVENOUS at 11:31

## 2024-04-22 RX ADMIN — FENTANYL CITRATE 50 MCG: 50 INJECTION INTRAMUSCULAR; INTRAVENOUS at 09:37

## 2024-04-22 RX ADMIN — SODIUM CHLORIDE, SODIUM LACTATE, POTASSIUM CHLORIDE, AND CALCIUM CHLORIDE 500 ML: .6; .31; .03; .02 INJECTION, SOLUTION INTRAVENOUS at 09:41

## 2024-04-22 RX ADMIN — AMOXICILLIN AND CLAVULANATE POTASSIUM 1 TABLET: 875; 125 TABLET, FILM COATED ORAL at 12:17

## 2024-04-22 NOTE — DISCHARGE INSTRUCTIONS
Your CT results:  IMPRESSION:     1. Suggestion of very mild acute uncomplicated descending diverticulitis.     2. Stable 2.9 cm left mid renal hypodense lesion with density greater than simple fluid. Consider outpatient ultrasound for further evaluation.     3. Mild stable mesenteric panniculitis.

## 2024-04-22 NOTE — ED PROVIDER NOTES
History  Chief Complaint   Patient presents with    Abdominal Pain     Pt thinks he has diverticulitis, having abdominal pain. States he had it before and it feels the same        Abdominal Pain    Patient has a history of atrial fibrillation on Xarelto and a history of prior diverticulitis with lower abdominal pain that started rather suddenly last night.  No diarrhea or hematochezia.  No urinary complaints.  No fever.  No flank pain.  He has had episodes of this in the past where he did not need to be admitted and was treated with antibiotics however he recalls somebody saying that if this keeps recurring he may need surgery.  He has no chest pain or shortness of breath.  No fevers or chills.  He is not nauseous.  Prior to Admission Medications   Prescriptions Last Dose Informant Patient Reported? Taking?   Omega-3 Fatty Acids (OMEGA-3 FISH OIL) 1000 MG CAPS 4/22/2024 Self Yes Yes   Sig: Take by mouth   XARELTO 20 MG tablet 4/21/2024 Self Yes Yes   Sig: daily   amLODIPine (NORVASC) 10 mg tablet 4/22/2024 Self Yes Yes   Sig: 10 mg 2 (two) times a day   benzonatate (TESSALON PERLES) 100 mg capsule Not Taking  No No   Sig: Take 1 capsule (100 mg total) by mouth every 8 (eight) hours   Patient not taking: Reported on 7/4/2023   benzonatate (TESSALON PERLES) 100 mg capsule Not Taking  No No   Sig: Take 1 capsule (100 mg total) by mouth 3 (three) times a day as needed for cough   Patient not taking: Reported on 4/22/2024   metFORMIN (GLUCOPHAGE) 500 mg tablet 4/22/2024 Self Yes Yes   Sig: Take 1,000 mg by mouth 2 (two) times a day   mupirocin (BACTROBAN) 2 % ointment Not Taking  Yes No   Sig: APPLY WITH EACH DRESSING CHANGE TWICE DAILY   Patient not taking: Reported on 4/22/2024   oxyCODONE-acetaminophen (Percocet) 5-325 mg per tablet Not Taking  No No   Sig: Take 1 tablet by mouth every 6 (six) hours as needed for severe pain Max Daily Amount: 4 tablets   Patient not taking: Reported on 11/3/2022   sertraline (ZOLOFT)  100 mg tablet 2024 Self Yes Yes   Sig: daily   sildenafil (VIAGRA) 50 MG tablet Unknown  Yes No   Sig: TAKE 1 TABLET BY MOUTH ONCE DAILY AS NEEDED APPROXIMATELY 1 HOUR BEFORE SEXUAL ACTIVITY   Patient not taking: Reported on 2024   simvastatin (ZOCOR) 5 MG tablet 2024 Self Yes Yes   Sig: Take 10 mg by mouth daily   sotalol (BETAPACE) 120 mg tablet 2024 Self Yes Yes   Si (two) times a day   spironolactone (ALDACTONE) 50 mg tablet 2024 Self Yes Yes   Sig: Take 25 mg by mouth 2 (two) times a day   traZODone (DESYREL) 100 mg tablet Not Taking  Yes No   Sig: Take 100 mg by mouth every evening   Patient not taking: Reported on 2024   venlafaxine (EFFEXOR-XR) 150 mg 24 hr capsule Not Taking  Yes No   Sig: Take 150 mg by mouth daily   Patient not taking: Reported on 2024      Facility-Administered Medications: None       Past Medical History:   Diagnosis Date    Atrial fibrillation (HCC)     Benign localized prostatic hyperplasia with lower urinary tract symptoms (LUTS)     Bilateral hydrocele     Hyperlipidemia     Hypertension     Other specified depressive episodes     Type 2 diabetes mellitus (HCC)        Past Surgical History:   Procedure Laterality Date    ATRIAL CARDIAC PACEMAKER INSERTION      CARDIAC SURGERY  10/2018    Ablation    CARPAL TUNNEL RELEASE Bilateral     CATARACT EXTRACTION, BILATERAL Bilateral     CYSTOSCOPY  2016    HERNIA REPAIR Bilateral     HYDROCELE EXCISION / REPAIR Bilateral 2016    THROAT SURGERY  1990    THUMB ARTHROSCOPY Right 2016       Family History   Problem Relation Age of Onset    Heart disease Mother     Diabetes Sister      I have reviewed and agree with the history as documented.    E-Cigarette/Vaping    E-Cigarette Use Never User      E-Cigarette/Vaping Substances     Social History     Tobacco Use    Smoking status: Never    Smokeless tobacco: Never   Vaping Use    Vaping status: Never Used   Substance Use Topics    Alcohol  use: No    Drug use: No       Review of Systems   Gastrointestinal:  Positive for abdominal pain.       Physical Exam  Physical Exam  Vitals and nursing note reviewed.   Constitutional:       General: He is not in acute distress.     Appearance: Normal appearance. He is well-developed. He is not ill-appearing, toxic-appearing or diaphoretic.   HENT:      Head: Normocephalic and atraumatic.      Right Ear: Hearing normal. No drainage or swelling.      Left Ear: Hearing normal. No drainage or swelling.   Eyes:      General: Lids are normal.         Right eye: No discharge.         Left eye: No discharge.      Conjunctiva/sclera: Conjunctivae normal.   Neck:      Vascular: No JVD.      Trachea: Trachea normal.   Cardiovascular:      Rate and Rhythm: Normal rate and regular rhythm.      Pulses: Normal pulses.      Heart sounds: Normal heart sounds. No murmur heard.     No friction rub. No gallop.   Pulmonary:      Effort: Pulmonary effort is normal. No respiratory distress.      Breath sounds: Normal breath sounds. No stridor. No wheezing or rales.   Chest:      Chest wall: No tenderness.   Abdominal:      General: Abdomen is protuberant.      Palpations: Abdomen is soft.      Tenderness: There is abdominal tenderness in the right lower quadrant, suprapubic area and left lower quadrant. There is no right CVA tenderness, left CVA tenderness, guarding or rebound.      Hernia: No hernia is present.      Comments: ABD is soft.   Musculoskeletal:         General: Normal range of motion.      Cervical back: Normal range of motion.   Skin:     General: Skin is warm and dry.      Coloration: Skin is not pale.      Findings: No rash.   Neurological:      General: No focal deficit present.      Mental Status: He is alert.      GCS: GCS eye subscore is 4. GCS verbal subscore is 5. GCS motor subscore is 6.      Sensory: No sensory deficit.      Motor: No abnormal muscle tone.   Psychiatric:         Mood and Affect: Mood normal.          Speech: Speech normal.         Behavior: Behavior is cooperative.         Vital Signs  ED Triage Vitals   Temperature Pulse Respirations Blood Pressure SpO2   04/22/24 0909 04/22/24 0909 04/22/24 0909 04/22/24 0909 04/22/24 0909   97.6 °F (36.4 °C) 84 19 160/79 95 %      Temp Source Heart Rate Source Patient Position - Orthostatic VS BP Location FiO2 (%)   04/22/24 0909 04/22/24 0909 04/22/24 0909 04/22/24 0909 --   Oral Monitor Lying Right arm       Pain Score       04/22/24 0937       8           Vitals:    04/22/24 0909 04/22/24 1030 04/22/24 1200   BP: 160/79 122/69 123/66   Pulse: 84 60 61   Patient Position - Orthostatic VS: Lying           Visual Acuity      ED Medications  Medications   fentaNYL injection 50 mcg (50 mcg Intravenous Given 4/22/24 0937)   lactated ringers bolus 500 mL (0 mL Intravenous Stopped 4/22/24 1055)   fentaNYL injection 50 mcg (50 mcg Intravenous Given 4/22/24 1019)   iohexol (OMNIPAQUE) 350 MG/ML injection (SINGLE-DOSE) 100 mL (100 mL Intravenous Given 4/22/24 1131)   amoxicillin-clavulanate (AUGMENTIN) 875-125 mg per tablet 1 tablet (1 tablet Oral Given 4/22/24 1217)       Diagnostic Studies  Results Reviewed       Procedure Component Value Units Date/Time    Urine Microscopic [[id number]]  (Abnormal) Collected: 04/22/24 1103    Lab Status: Final result Specimen: Urine, Clean Catch Updated: 04/22/24 1214     RBC, UA 1-2 /hpf      WBC, UA 4-10 /hpf      Epithelial Cells Occasional /hpf      Bacteria, UA None Seen /hpf      Hyaline Casts, UA 0-3 /lpf     Urine Macroscopic, POC [[id number]]  (Abnormal) Collected: 04/22/24 1103    Lab Status: Final result Specimen: Urine Updated: 04/22/24 1105     Color, UA Yellow     Clarity, UA Clear     pH, UA 6.0     Leukocytes, UA Trace     Nitrite, UA Negative     Protein, UA Negative mg/dl      Glucose, UA Negative mg/dl      Ketones, UA Negative mg/dl      Urobilinogen, UA 0.2 E.U./dl      Bilirubin, UA Negative     Occult Blood, UA Negative      Specific Gravity, UA 1.020    Narrative:      CLIN[facility]K RESULT    Comprehensive metabolic panel [[id number]]  (Abnormal) Collected: 04/22/24 0943    Lab Status: Final result Specimen: Blood from Arm, Left Updated: 04/22/24 1103     Sodium 138 mmol/L      Potassium 4.4 mmol/L      Chloride 104 mmol/L      CO2 23 mmol/L      ANION GAP 11 mmol/L      BUN 27 mg/dL      Creatinine 1.58 mg/dL      Glucose 139 mg/dL      Calcium 10.4 mg/dL      AST 19 U/L      ALT 34 U/L      Alkaline Phosphatase 51 U/L      Total Protein 8.8 g/dL      Albumin 4.8 g/dL      Total Bilirubin 0.88 mg/dL      eGFR 42 ml/min/1.73sq m     Narrative:      [employer] guidelines for Chronic Kidney Disease (CKD):     Stage 1 with normal or high GFR (GFR > 90 mL/min/1.73 square meters)    Stage 2 Mild CKD (GFR = 60-89 mL/min/1.73 square meters)    Stage 3A Moderate CKD (GFR = 45-59 mL/min/1.73 square meters)    Stage 3B Moderate CKD (GFR = 30-44 mL/min/1.73 square meters)    Stage 4 Severe CKD (GFR = 15-29 mL/min/1.73 square meters)    Stage 5 End Stage CKD (GFR <15 mL/min/1.73 square meters)  Note: GFR calculation is accurate only with a steady state creatinine    Lipase [[id number]]  (Normal) Collected: 04/22/24 0943    Lab Status: Final result Specimen: Blood from Arm, Left Updated: 04/22/24 1103     Lipase 62 u/L     Protime-INR [[id number]]  (Abnormal) Collected: 04/22/24 0943    Lab Status: Final result Specimen: Blood from Arm, Left Updated: 04/22/24 1018     Protime 16.1 seconds      INR 1.26    APTT [[id number]]  (Normal) Collected: 04/22/24 0943    Lab Status: Final result Specimen: Blood from Arm, Left Updated: 04/22/24 1018     PTT 35 seconds     CBC and differential [[id number]]  (Abnormal) Collected: 04/22/24 0943    Lab Status: Final result Specimen: Blood from Arm, Left Updated: 04/22/24 0952     WBC 10.56 Thousand/uL      RBC 5.20 Million/uL      Hemoglobin 14.9 g/dL      Hematocrit 44.3 %      MCV 85 fL       MCH 28.7 pg      MCHC 33.6 g/dL      RDW 12.8 %      MPV 8.9 fL      Platelets 319 Thousands/uL      nRBC 0 /100 WBCs      Segmented % 71 %      Immature Grans % 0 %      Lymphocytes % 17 %      Monocytes % 7 %      Eosinophils Relative 4 %      Basophils Relative 1 %      Absolute Neutrophils 7.48 Thousands/µL      Absolute Immature Grans 0.04 Thousand/uL      Absolute Lymphocytes 1.83 Thousands/µL      Absolute Monocytes 0.71 Thousand/µL      Eosinophils Absolute 0.42 Thousand/µL      Basophils Absolute 0.08 Thousands/µL                    CT abdomen pelvis with contrast   ED Interpretation by Vitaliy Garces MD (04/22 1230)   I have reviewed the film, per my independent interpretation : sigmoid diverticulitis without abscess or perforation. No free air. Formal read per radiology.        Final Result by Andrew Meneses MD (04/22 1147)      1. Suggestion of very mild acute uncomplicated descending diverticulitis.      2. Stable 2.9 cm left mid renal hypodense lesion with density greater than simple fluid. Consider outpatient ultrasound for further evaluation.      3. Mild stable mesenteric panniculitis.         The study was marked in EPIC for immediate notification.      Workstation performed: [id number]                    Procedures  Procedures         ED Course  ED Course as of 04/22/24 1234   Mon Apr 22, 2024   1008 WBC(!): 10.56  Only very mildly elevated.  Does not appear to have a bandemia.  Normal hemoglobin and platelet count.   1015 Patient given more pain medication as he still having a fair amount of pain.   1019 POCT INR(!): 1.26  Suspect this could be elevated because of his anticoagulation.   1108 BUN(!): 27   1108 Creatinine(!): 1.58  Patient's kidney function is a little worse.  He does state he has not really been drinking any water.  I did hydrate him.  His LFTs are normal.  The rest of his electrolytes are rather unremarkable.  His glucose is 139.   1108 LIPASE: 62  Negative for  pancreatitis.                               SBIRT 20yo+      Flowsheet Row Most Recent Value   Initial Alcohol Screen: US AUDIT-C     1. How often do you have a drink containing alcohol? 0 Filed at: 04/22/2024 0909   2. How many drinks containing alcohol do you have on a typical day you are drinking?  0 Filed at: 04/22/2024 0909   3b. FEMALE Any Age, or MALE 65+: How often do you have 4 or more drinks on one occassion? 0 Filed at: 04/22/2024 0909   Audit-C Score 0 Filed at: 04/22/2024 0909   JONI: How many times in the past year have you...    Used an illegal drug or used a prescription medication for non-medical reasons? Never Filed at: 04/22/2024 0909                      Medical Decision Making  Patient with acute mild diverticulitis without evidence of abscess or perforation.  Does not require acute surgical consult or inpatient admission.  Has no fever.  Will trial oral antibiotics and follow-up with his primary care doctor.  He has a renal lesion that needs an outpatient ultrasound so I explained this to the patient and his wife and also placed the reading in the discharge instructions and inform them that they need to follow-up with primary care doctor for this as well.  He has been tolerating p.o.  He does have some mild renal insufficiency where he was given IV hydration here and was encouraged to also eat and drink at home since he is not vomiting.    Amount and/or Complexity of Data Reviewed  Labs: ordered. Decision-making details documented in ED Course.  Radiology: ordered.    Risk  Prescription drug management.             Disposition  Final diagnoses:   Acute diverticulitis   Abnormal CT scan, kidney - left   Mesenteric panniculitis (HCC)     Time reflects when diagnosis was documented in both MDM as applicable and the Disposition within this note       Time User Action Codes Description Comment    4/22/2024 12:17 PM Vitaliy Garces Add [K57.92] Acute diverticulitis     4/22/2024 12:17 PM  Vitaliy Garces Add [R93.429] Abnormal CT scan, kidney     4/22/2024 12:17 PM Vitaliy Garces Modify [R93.429] Abnormal CT scan, kidney left    4/22/2024 12:18 PM Vitaliy Garces Add [K65.4] Mesenteric panniculitis (HCC)           ED Disposition       ED Disposition   Discharge    Condition   Stable    Date/Time   Mon Apr 22, 2024 1217    Comment   Amrit Becerra discharge to home/self care.                   Follow-up Information       Follow up With Specialties Details Why Contact Info    Sarah Dukes MD Internal Medicine Schedule an appointment as soon as possible for a visit in 1 week reevaluation and to order your follow up ultrasound of your kidney. [address] [address]  [phone]              Patient's Medications   Discharge Prescriptions    AMOXICILLIN-CLAVULANATE (AUGMENTIN) 875-125 MG PER TABLET    Take 1 tablet by mouth every 12 (twelve) hours for 7 days       Start Date: 4/22/2024 End Date: 4/29/2024       Order Dose: 1 tablet       Quantity: 14 tablet    Refills: 0       No discharge procedures on file.    PDMP Review       None            ED Provider  Electronically Signed by             Vitaliy Garces MD  04/22/24 9350

## 2024-05-04 ENCOUNTER — HOSPITAL ENCOUNTER (INPATIENT)
Facility: HOSPITAL | Age: 74
LOS: 2 days | Discharge: HOME | DRG: 392 | End: 2024-05-06
Attending: EMERGENCY MEDICINE | Admitting: STUDENT IN AN ORGANIZED HEALTH CARE EDUCATION/TRAINING PROGRAM
Payer: MEDICARE

## 2024-05-04 ENCOUNTER — APPOINTMENT (EMERGENCY)
Dept: CT IMAGING | Facility: HOSPITAL | Age: 74
DRG: 392 | End: 2024-05-04
Payer: MEDICARE

## 2024-05-04 DIAGNOSIS — R10.9 ABDOMINAL PAIN: Primary | ICD-10-CM

## 2024-05-04 DIAGNOSIS — K57.92 DIVERTICULITIS: ICD-10-CM

## 2024-05-04 PROBLEM — G47.33 OSA (OBSTRUCTIVE SLEEP APNEA): Status: ACTIVE | Noted: 2024-05-04

## 2024-05-04 PROBLEM — N18.31 STAGE 3A CHRONIC KIDNEY DISEASE (CKD) (HCC): Status: ACTIVE | Noted: 2024-05-04

## 2024-05-04 PROBLEM — I48.19 PERSISTENT ATRIAL FIBRILLATION (HCC): Status: ACTIVE | Noted: 2024-05-04

## 2024-05-04 PROBLEM — E11.9 TYPE 2 DIABETES MELLITUS, WITHOUT LONG-TERM CURRENT USE OF INSULIN (HCC): Status: ACTIVE | Noted: 2024-05-04

## 2024-05-04 PROBLEM — N28.1 CYST OF LEFT KIDNEY: Status: ACTIVE | Noted: 2024-05-04

## 2024-05-04 PROBLEM — K76.0 HEPATIC STEATOSIS: Status: ACTIVE | Noted: 2024-05-04

## 2024-05-04 LAB
ALBUMIN SERPL BCP-MCNC: 4.4 G/DL (ref 3.5–5)
ALP SERPL-CCNC: 106 U/L (ref 34–104)
ALT SERPL W P-5'-P-CCNC: 100 U/L (ref 7–52)
ANION GAP SERPL CALCULATED.3IONS-SCNC: 6 MMOL/L (ref 4–13)
AST SERPL W P-5'-P-CCNC: 24 U/L (ref 13–39)
BACTERIA UR QL AUTO: ABNORMAL /HPF
BASOPHILS # BLD AUTO: 0.07 THOUSANDS/ÂΜL (ref 0–0.1)
BASOPHILS NFR BLD AUTO: 1 % (ref 0–1)
BILIRUB DIRECT SERPL-MCNC: 0.15 MG/DL (ref 0–0.2)
BILIRUB SERPL-MCNC: 0.69 MG/DL (ref 0.2–1)
BILIRUB UR QL STRIP: NEGATIVE
BUN SERPL-MCNC: 27 MG/DL (ref 5–25)
CALCIUM SERPL-MCNC: 10.4 MG/DL (ref 8.4–10.2)
CHLORIDE SERPL-SCNC: 104 MMOL/L (ref 96–108)
CLARITY UR: CLEAR
CO2 SERPL-SCNC: 27 MMOL/L (ref 21–32)
COLOR UR: YELLOW
CREAT SERPL-MCNC: 1.3 MG/DL (ref 0.6–1.3)
EOSINOPHIL # BLD AUTO: 0.26 THOUSAND/ÂΜL (ref 0–0.61)
EOSINOPHIL NFR BLD AUTO: 2 % (ref 0–6)
ERYTHROCYTE [DISTWIDTH] IN BLOOD BY AUTOMATED COUNT: 13.2 % (ref 11.6–15.1)
GFR SERPL CREATININE-BSD FRML MDRD: 54 ML/MIN/1.73SQ M
GLUCOSE SERPL-MCNC: 121 MG/DL (ref 65–140)
GLUCOSE SERPL-MCNC: 145 MG/DL (ref 65–140)
GLUCOSE SERPL-MCNC: 170 MG/DL (ref 65–140)
GLUCOSE UR STRIP-MCNC: NEGATIVE MG/DL
HCT VFR BLD AUTO: 40.6 % (ref 36.5–49.3)
HGB BLD-MCNC: 13.3 G/DL (ref 12–17)
HGB UR QL STRIP.AUTO: NEGATIVE
HYALINE CASTS #/AREA URNS LPF: ABNORMAL /LPF
IMM GRANULOCYTES # BLD AUTO: 0.11 THOUSAND/UL (ref 0–0.2)
IMM GRANULOCYTES NFR BLD AUTO: 1 % (ref 0–2)
KETONES UR STRIP-MCNC: NEGATIVE MG/DL
LEUKOCYTE ESTERASE UR QL STRIP: NEGATIVE
LIPASE SERPL-CCNC: 122 U/L (ref 11–82)
LYMPHOCYTES # BLD AUTO: 1.33 THOUSANDS/ÂΜL (ref 0.6–4.47)
LYMPHOCYTES NFR BLD AUTO: 9 % (ref 14–44)
MCH RBC QN AUTO: 29.4 PG (ref 26.8–34.3)
MCHC RBC AUTO-ENTMCNC: 32.8 G/DL (ref 31.4–37.4)
MCV RBC AUTO: 90 FL (ref 82–98)
MONOCYTES # BLD AUTO: 0.92 THOUSAND/ÂΜL (ref 0.17–1.22)
MONOCYTES NFR BLD AUTO: 6 % (ref 4–12)
NEUTROPHILS # BLD AUTO: 12.73 THOUSANDS/ÂΜL (ref 1.85–7.62)
NEUTS SEG NFR BLD AUTO: 81 % (ref 43–75)
NITRITE UR QL STRIP: NEGATIVE
NON-SQ EPI CELLS URNS QL MICRO: ABNORMAL /HPF
NRBC BLD AUTO-RTO: 0 /100 WBCS
PH UR STRIP.AUTO: 5.5 [PH] (ref 4.5–8)
PLATELET # BLD AUTO: 336 THOUSANDS/UL (ref 149–390)
PMV BLD AUTO: 8.7 FL (ref 8.9–12.7)
POTASSIUM SERPL-SCNC: 4.5 MMOL/L (ref 3.5–5.3)
PROT SERPL-MCNC: 8 G/DL (ref 6.4–8.4)
PROT UR STRIP-MCNC: ABNORMAL MG/DL
RBC # BLD AUTO: 4.53 MILLION/UL (ref 3.88–5.62)
RBC #/AREA URNS AUTO: ABNORMAL /HPF
SODIUM SERPL-SCNC: 137 MMOL/L (ref 135–147)
SP GR UR STRIP.AUTO: 1.02 (ref 1–1.03)
UROBILINOGEN UR QL STRIP.AUTO: 0.2 E.U./DL
WBC # BLD AUTO: 15.42 THOUSAND/UL (ref 4.31–10.16)
WBC #/AREA URNS AUTO: ABNORMAL /HPF

## 2024-05-04 PROCEDURE — 82948 REAGENT STRIP/BLOOD GLUCOSE: CPT

## 2024-05-04 PROCEDURE — 96361 HYDRATE IV INFUSION ADD-ON: CPT

## 2024-05-04 PROCEDURE — 83690 ASSAY OF LIPASE: CPT | Performed by: EMERGENCY MEDICINE

## 2024-05-04 PROCEDURE — 87040 BLOOD CULTURE FOR BACTERIA: CPT | Performed by: EMERGENCY MEDICINE

## 2024-05-04 PROCEDURE — 99285 EMERGENCY DEPT VISIT HI MDM: CPT | Performed by: EMERGENCY MEDICINE

## 2024-05-04 PROCEDURE — 96375 TX/PRO/DX INJ NEW DRUG ADDON: CPT

## 2024-05-04 PROCEDURE — 85025 COMPLETE CBC W/AUTO DIFF WBC: CPT | Performed by: EMERGENCY MEDICINE

## 2024-05-04 PROCEDURE — 99223 1ST HOSP IP/OBS HIGH 75: CPT | Performed by: STUDENT IN AN ORGANIZED HEALTH CARE EDUCATION/TRAINING PROGRAM

## 2024-05-04 PROCEDURE — 96376 TX/PRO/DX INJ SAME DRUG ADON: CPT

## 2024-05-04 PROCEDURE — 36415 COLL VENOUS BLD VENIPUNCTURE: CPT | Performed by: EMERGENCY MEDICINE

## 2024-05-04 PROCEDURE — 80048 BASIC METABOLIC PNL TOTAL CA: CPT | Performed by: EMERGENCY MEDICINE

## 2024-05-04 PROCEDURE — 74177 CT ABD & PELVIS W/CONTRAST: CPT

## 2024-05-04 PROCEDURE — 96365 THER/PROPH/DIAG IV INF INIT: CPT

## 2024-05-04 PROCEDURE — 81001 URINALYSIS AUTO W/SCOPE: CPT

## 2024-05-04 PROCEDURE — 80076 HEPATIC FUNCTION PANEL: CPT | Performed by: EMERGENCY MEDICINE

## 2024-05-04 PROCEDURE — 99284 EMERGENCY DEPT VISIT MOD MDM: CPT

## 2024-05-04 PROCEDURE — 96367 TX/PROPH/DG ADDL SEQ IV INF: CPT

## 2024-05-04 RX ORDER — POLYETHYLENE GLYCOL 3350 17 G/17G
17 POWDER, FOR SOLUTION ORAL DAILY PRN
Status: DISCONTINUED | OUTPATIENT
Start: 2024-05-04 | End: 2024-05-06 | Stop reason: HOSPADM

## 2024-05-04 RX ORDER — SPIRONOLACTONE 25 MG/1
25 TABLET ORAL 2 TIMES DAILY
Status: DISCONTINUED | OUTPATIENT
Start: 2024-05-04 | End: 2024-05-06 | Stop reason: HOSPADM

## 2024-05-04 RX ORDER — ACETAMINOPHEN 10 MG/ML
1000 INJECTION, SOLUTION INTRAVENOUS ONCE
Status: COMPLETED | OUTPATIENT
Start: 2024-05-04 | End: 2024-05-04

## 2024-05-04 RX ORDER — PRAVASTATIN SODIUM 20 MG
20 TABLET ORAL
Status: DISCONTINUED | OUTPATIENT
Start: 2024-05-04 | End: 2024-05-06 | Stop reason: HOSPADM

## 2024-05-04 RX ORDER — INSULIN LISPRO 100 [IU]/ML
1-6 INJECTION, SOLUTION INTRAVENOUS; SUBCUTANEOUS
Status: DISCONTINUED | OUTPATIENT
Start: 2024-05-04 | End: 2024-05-06 | Stop reason: HOSPADM

## 2024-05-04 RX ORDER — CHLORAL HYDRATE 500 MG
1000 CAPSULE ORAL 2 TIMES DAILY
Status: DISCONTINUED | OUTPATIENT
Start: 2024-05-04 | End: 2024-05-06 | Stop reason: HOSPADM

## 2024-05-04 RX ORDER — AMLODIPINE BESYLATE 10 MG/1
10 TABLET ORAL 2 TIMES DAILY
Status: DISCONTINUED | OUTPATIENT
Start: 2024-05-04 | End: 2024-05-06 | Stop reason: HOSPADM

## 2024-05-04 RX ORDER — SOTALOL HYDROCHLORIDE 120 MG/1
120 TABLET ORAL EVERY 12 HOURS SCHEDULED
Status: DISCONTINUED | OUTPATIENT
Start: 2024-05-04 | End: 2024-05-06 | Stop reason: HOSPADM

## 2024-05-04 RX ORDER — HYDROMORPHONE HCL/PF 1 MG/ML
1 SYRINGE (ML) INJECTION ONCE
Status: COMPLETED | OUTPATIENT
Start: 2024-05-04 | End: 2024-05-04

## 2024-05-04 RX ORDER — ACETAMINOPHEN 325 MG/1
650 TABLET ORAL EVERY 6 HOURS PRN
Status: DISCONTINUED | OUTPATIENT
Start: 2024-05-04 | End: 2024-05-06 | Stop reason: HOSPADM

## 2024-05-04 RX ORDER — ONDANSETRON 2 MG/ML
4 INJECTION INTRAMUSCULAR; INTRAVENOUS EVERY 6 HOURS PRN
Status: DISCONTINUED | OUTPATIENT
Start: 2024-05-04 | End: 2024-05-06 | Stop reason: HOSPADM

## 2024-05-04 RX ORDER — OXYCODONE HYDROCHLORIDE 5 MG/1
5 TABLET ORAL EVERY 6 HOURS PRN
Status: DISCONTINUED | OUTPATIENT
Start: 2024-05-04 | End: 2024-05-06 | Stop reason: HOSPADM

## 2024-05-04 RX ADMIN — SPIRONOLACTONE 25 MG: 25 TABLET ORAL at 20:25

## 2024-05-04 RX ADMIN — PIPERACILLIN SODIUM AND TAZOBACTAM SODIUM 4.5 G: 36; 4.5 INJECTION, POWDER, LYOPHILIZED, FOR SOLUTION INTRAVENOUS at 12:19

## 2024-05-04 RX ADMIN — AMLODIPINE BESYLATE 10 MG: 10 TABLET ORAL at 20:25

## 2024-05-04 RX ADMIN — SOTALOL HYDROCHLORIDE 120 MG: 120 TABLET ORAL at 20:25

## 2024-05-04 RX ADMIN — PIPERACILLIN SODIUM AND TAZOBACTAM SODIUM 4.5 G: 36; 4.5 INJECTION, POWDER, LYOPHILIZED, FOR SOLUTION INTRAVENOUS at 18:17

## 2024-05-04 RX ADMIN — IOHEXOL 100 ML: 350 INJECTION, SOLUTION INTRAVENOUS at 12:06

## 2024-05-04 RX ADMIN — SODIUM CHLORIDE 1000 ML: 0.9 INJECTION, SOLUTION INTRAVENOUS at 11:09

## 2024-05-04 RX ADMIN — HYDROMORPHONE HYDROCHLORIDE 1 MG: 1 INJECTION, SOLUTION INTRAMUSCULAR; INTRAVENOUS; SUBCUTANEOUS at 11:39

## 2024-05-04 RX ADMIN — OMEGA-3 FATTY ACIDS CAP 1000 MG 1000 MG: 1000 CAP at 18:17

## 2024-05-04 RX ADMIN — RIVAROXABAN 20 MG: 20 TABLET, FILM COATED ORAL at 19:09

## 2024-05-04 RX ADMIN — PRAVASTATIN SODIUM 20 MG: 20 TABLET ORAL at 18:17

## 2024-05-04 RX ADMIN — HYDROMORPHONE HYDROCHLORIDE 1 MG: 1 INJECTION, SOLUTION INTRAMUSCULAR; INTRAVENOUS; SUBCUTANEOUS at 13:31

## 2024-05-04 RX ADMIN — ACETAMINOPHEN 1000 MG: 10 INJECTION INTRAVENOUS at 11:10

## 2024-05-04 NOTE — ASSESSMENT & PLAN NOTE
Incidental note completed   Redemonstrated probable complex cyst arising from the posterior interpolar region of the left kidney. Again, nonemergent dedicated renal ultrasound is recommended for further evaluation.   Will order renal US here

## 2024-05-04 NOTE — ASSESSMENT & PLAN NOTE
Failed outpatient treatment   CT A/P: acute diverticulitis of the sigmoid colon. No evidence for macroperforation. No associated pericolonic abscess   Given IV Zosyn in the ED, can continue for now   GI consulted   Serial abdominal exam   Clear liquids for now   Pain control   Monitor fever curve, hemodyamics   Without evidence for sepsis at this time

## 2024-05-04 NOTE — PLAN OF CARE

## 2024-05-04 NOTE — ED PROVIDER NOTES
History  Chief Complaint   Patient presents with    Abdominal Pain     Worsening abdominal pain. Seen on 4/22 for same and dx with diverticulitis. Taking tyelnol.      Amrit is a pleasant 73-year-old male here for evaluation of left-sided lower abdominal pain.  He was seen in our ED on 4/22/2024 and diagnosed with acute uncomplicated diverticulitis.  He took antibiotics and his symptoms improved.  However, last night he had return of similar left-sided lower abdominal pain that has progressively gotten worse.  He has had several episodes of nonbloody, nonbilious vomiting.  He denies fevers.  He estimates that this is his fourth bout of diverticulitis in his lifetime.  He has not had any previous intra-abdominal surgeries.          Prior to Admission Medications   Prescriptions Last Dose Informant Patient Reported? Taking?   Omega-3 Fatty Acids (OMEGA-3 FISH OIL) 1000 MG CAPS  Self Yes No   Sig: Take by mouth   XARELTO 20 MG tablet  Self Yes No   Sig: daily   amLODIPine (NORVASC) 10 mg tablet  Self Yes No   Sig: 10 mg 2 (two) times a day   benzonatate (TESSALON PERLES) 100 mg capsule   No No   Sig: Take 1 capsule (100 mg total) by mouth every 8 (eight) hours   Patient not taking: Reported on 7/4/2023   benzonatate (TESSALON PERLES) 100 mg capsule   No No   Sig: Take 1 capsule (100 mg total) by mouth 3 (three) times a day as needed for cough   Patient not taking: Reported on 4/22/2024   metFORMIN (GLUCOPHAGE) 500 mg tablet  Self Yes No   Sig: Take 1,000 mg by mouth 2 (two) times a day   mupirocin (BACTROBAN) 2 % ointment   Yes No   Sig: APPLY WITH EACH DRESSING CHANGE TWICE DAILY   Patient not taking: Reported on 4/22/2024   oxyCODONE-acetaminophen (Percocet) 5-325 mg per tablet   No No   Sig: Take 1 tablet by mouth every 6 (six) hours as needed for severe pain Max Daily Amount: 4 tablets   Patient not taking: Reported on 11/3/2022   sertraline (ZOLOFT) 100 mg tablet  Self Yes No   Sig: daily   sildenafil (VIAGRA) 50  MG tablet   Yes No   Sig: TAKE 1 TABLET BY MOUTH ONCE DAILY AS NEEDED APPROXIMATELY 1 HOUR BEFORE SEXUAL ACTIVITY   Patient not taking: Reported on 2024   simvastatin (ZOCOR) 5 MG tablet  Self Yes No   Sig: Take 10 mg by mouth daily   sotalol (BETAPACE) 120 mg tablet  Self Yes No   Si (two) times a day   spironolactone (ALDACTONE) 50 mg tablet  Self Yes No   Sig: Take 25 mg by mouth 2 (two) times a day   traZODone (DESYREL) 100 mg tablet   Yes No   Sig: Take 100 mg by mouth every evening   Patient not taking: Reported on 2024   venlafaxine (EFFEXOR-XR) 150 mg 24 hr capsule   Yes No   Sig: Take 150 mg by mouth daily   Patient not taking: Reported on 2024      Facility-Administered Medications: None       Past Medical History:   Diagnosis Date    Atrial fibrillation (HCC)     Benign localized prostatic hyperplasia with lower urinary tract symptoms (LUTS)     Bilateral hydrocele     Hyperlipidemia     Hypertension     Other specified depressive episodes     Type 2 diabetes mellitus (HCC)        Past Surgical History:   Procedure Laterality Date    ATRIAL CARDIAC PACEMAKER INSERTION      CARDIAC SURGERY  10/2018    Ablation    CARPAL TUNNEL RELEASE Bilateral     CATARACT EXTRACTION, BILATERAL Bilateral     CYSTOSCOPY  2016    HERNIA REPAIR Bilateral     HYDROCELE EXCISION / REPAIR Bilateral 2016    THROAT SURGERY      THUMB ARTHROSCOPY Right 2016       Family History   Problem Relation Age of Onset    Heart disease Mother     Diabetes Sister      I have reviewed and agree with the history as documented.    E-Cigarette/Vaping    E-Cigarette Use Never User      E-Cigarette/Vaping Substances     Social History     Tobacco Use    Smoking status: Never    Smokeless tobacco: Never   Vaping Use    Vaping status: Never Used   Substance Use Topics    Alcohol use: No    Drug use: No       Review of Systems   Constitutional:  Negative for chills and fever.   HENT:  Negative for sore throat.     Eyes:  Negative for visual disturbance.   Respiratory:  Negative for cough and shortness of breath.    Cardiovascular:  Negative for chest pain and palpitations.   Gastrointestinal:  Positive for abdominal pain, nausea and vomiting.   Genitourinary:  Negative for dysuria and hematuria.   Musculoskeletal:  Negative for arthralgias and back pain.   Skin:  Negative for color change and rash.   Neurological:  Negative for syncope.   All other systems reviewed and are negative.      Physical Exam  Physical Exam  Vitals and nursing note reviewed.   Constitutional:       General: He is not in acute distress.     Appearance: He is well-developed.   HENT:      Head: Normocephalic and atraumatic.   Eyes:      Conjunctiva/sclera: Conjunctivae normal.   Cardiovascular:      Rate and Rhythm: Normal rate and regular rhythm.      Heart sounds: No murmur heard.  Pulmonary:      Effort: Pulmonary effort is normal. No respiratory distress.      Breath sounds: Normal breath sounds.   Abdominal:      Palpations: Abdomen is soft.      Tenderness: There is abdominal tenderness in the left lower quadrant. There is no guarding or rebound.   Musculoskeletal:         General: No swelling.      Cervical back: Neck supple.   Skin:     General: Skin is warm and dry.      Capillary Refill: Capillary refill takes less than 2 seconds.   Neurological:      General: No focal deficit present.      Mental Status: He is alert and oriented to person, place, and time.   Psychiatric:         Mood and Affect: Mood normal.         Vital Signs  ED Triage Vitals   Temperature Pulse Respirations Blood Pressure SpO2   05/04/24 0958 05/04/24 0958 05/04/24 0958 05/04/24 0958 05/04/24 0958   98.1 °F (36.7 °C) 76 18 156/72 96 %      Temp src Heart Rate Source Patient Position - Orthostatic VS BP Location FiO2 (%)   -- 05/04/24 1138 05/04/24 1138 05/04/24 1138 --    Monitor Sitting Right arm       Pain Score       05/04/24 1139       10 - Worst Possible Pain            Vitals:    05/04/24 0958 05/04/24 1138 05/04/24 1329   BP: 156/72 148/70 133/65   Pulse: 76 62 76   Patient Position - Orthostatic VS:  Sitting Sitting         Visual Acuity      ED Medications  Medications   sodium chloride 0.9 % bolus 1,000 mL (1,000 mL Intravenous New Bag 5/4/24 1109)   acetaminophen (Ofirmev) injection 1,000 mg (0 mg Intravenous Stopped 5/4/24 1139)   HYDROmorphone (DILAUDID) injection 1 mg (1 mg Intravenous Given 5/4/24 1139)   iohexol (OMNIPAQUE) 350 MG/ML injection (MULTI-DOSE) 100 mL (100 mL Intravenous Given 5/4/24 1206)   piperacillin-tazobactam (ZOSYN) IVPB 4.5 g (4.5 g Intravenous New Bag 5/4/24 1219)   HYDROmorphone (DILAUDID) injection 1 mg (1 mg Intravenous Given 5/4/24 1331)       Diagnostic Studies  Results Reviewed       Procedure Component Value Units Date/Time    Blood culture #2 [[id number]] Collected: 05/04/24 1216    Lab Status: In process Specimen: Blood from Hand, Right Updated: 05/04/24 1227    Blood culture #1 [[id number]] Collected: 05/04/24 1213    Lab Status: In process Specimen: Blood from Arm, Left Updated: 05/04/24 1227    Urine Microscopic [[id number]]  (Abnormal) Collected: 05/04/24 1114    Lab Status: Final result Specimen: Urine, Other Updated: 05/04/24 1152     RBC, UA 1-2 /hpf      WBC, UA 1-2 /hpf      Epithelial Cells None Seen /hpf      Bacteria, UA None Seen /hpf      Hyaline Casts, UA 10-25 /lpf     Basic metabolic panel [[id number]]  (Abnormal) Collected: 05/04/24 1109    Lab Status: Final result Specimen: Blood from Arm, Left Updated: 05/04/24 1142     Sodium 137 mmol/L      Potassium 4.5 mmol/L      Chloride 104 mmol/L      CO2 27 mmol/L      ANION GAP 6 mmol/L      BUN 27 mg/dL      Creatinine 1.30 mg/dL      Glucose 170 mg/dL      Calcium 10.4 mg/dL      eGFR 54 ml/min/1.73sq m     Narrative:      National Kid[employer] Foundation guidelines for Chronic Kidney Disease (CKD):     Stage 1 with normal or high GFR (GFR > 90 mL/min/1.73 square meters)     Stage 2 Mild CKD (GFR = 60-89 mL/min/1.73 square meters)    Stage 3A Moderate CKD (GFR = 45-59 mL/min/1.73 square meters)    Stage 3B Moderate CKD (GFR = 30-44 mL/min/1.73 square meters)    Stage 4 Severe CKD (GFR = 15-29 mL/min/1.73 square meters)    Stage 5 End Stage CKD (GFR <15 mL/min/1.73 square meters)  Note: GFR calculation is accurate only with a steady state creatinine    Hepatic function panel [[id number]]  (Abnormal) Collected: 05/04/24 1109    Lab Status: Final result Specimen: Blood from Arm, Left Updated: 05/04/24 1142     Total Bilirubin 0.69 mg/dL      Bilirubin, Direct 0.15 mg/dL      Alkaline Phosphatase 106 U/L      AST 24 U/L       U/L      Total Protein 8.0 g/dL      Albumin 4.4 g/dL     Lipase [[id number]]  (Abnormal) Collected: 05/04/24 1109    Lab Status: Final result Specimen: Blood from Arm, Left Updated: 05/04/24 1142     Lipase 122 u/L     CBC and differential [[id number]]  (Abnormal) Collected: 05/04/24 1109    Lab Status: Final result Specimen: Blood from Arm, Left Updated: 05/04/24 1120     WBC 15.42 Thousand/uL      RBC 4.53 Million/uL      Hemoglobin 13.3 g/dL      Hematocrit 40.6 %      MCV 90 fL      MCH 29.4 pg      MCHC 32.8 g/dL      RDW 13.2 %      MPV 8.7 fL      Platelets 336 Thousands/uL      nRBC 0 /100 WBCs      Segmented % 81 %      Immature Grans % 1 %      Lymphocytes % 9 %      Monocytes % 6 %      Eosinophils Relative 2 %      Basophils Relative 1 %      Absolute Neutrophils 12.73 Thousands/µL      Absolute Immature Grans 0.11 Thousand/uL      Absolute Lymphocytes 1.33 Thousands/µL      Absolute Monocytes 0.92 Thousand/µL      Eosinophils Absolute 0.26 Thousand/µL      Basophils Absolute 0.07 Thousands/µL     Urine Macroscopic, POC [[id number]]  (Abnormal) Collected: 05/04/24 1114    Lab Status: Final result Specimen: Urine Updated: 05/04/24 1116     Color, UA Yellow     Clarity, UA Clear     pH, UA 5.5     Leukocytes, UA Negative     Nitrite, UA Negative      Protein, UA 30 (1+) mg/dl      Glucose, UA Negative mg/dl      Ketones, UA Negative mg/dl      Urobilinogen, UA 0.2 E.U./dl      Bilirubin, UA Negative     Occult Blood, UA Negative     Specific Gravity, UA 1.020    Narrative:      CLINITEK RESULT                   CT abdomen pelvis with contrast   Final Result by Margarito Grimes MD (05/04 1302)      Findings consistent with acute diverticulitis of the sigmoid colon. No evidence for macroperforation. No associated pericolonic abscess.      Redemonstrated probable complex cyst arising from the posterior interpolar region of the left kidney. Again, nonemergent dedicated renal ultrasound is recommended for further evaluation.      Called physis. No pericholecystic inflammation.      Hepatomegaly/hepatic steatosis.      The study was marked in EPIC for immediate notification.         Workstation performed: [id number]                    Procedures  Procedures         ED Course                               SBIRT 22yo+      Flowsheet Row Most Recent Value   Initial Alcohol Screen: US AUDIT-C     1. How often do you have a drink containing alcohol? 0 Filed at: 05/04/2024 1040   2. How many drinks containing alcohol do you have on a typical day you are drinking?  0 Filed at: 05/04/2024 1040   3b. FEMALE Any Age, or MALE 65+: How often do you have 4 or more drinks on one occassion? 0 Filed at: 05/04/2024 1040   Audit-C Score 0 Filed at: 05/04/2024 1040   JONI: How many times in the past year have you...    Used an illegal drug or used a prescription medication for non-medical reasons? Never Filed at: 05/04/2024 1040                      Medical Decision Making  73-year-old male here with recurrent left lower abdominal pain.  CT imaging shows recurrent acute diverticulitis without perforation or abscess.  Given that patient appears to have failed outpatient treatment, has had nausea and vomiting and has had difficult to control pain we will plan for admission for IV antibiotics  and specialist consultation.    Amount and/or Complexity of Data Reviewed  Labs: ordered. Decision-making details documented in ED Course.  Radiology: ordered. Decision-making details documented in ED Course.    Risk  Prescription drug management.  Decision regarding hospitalization.             Disposition  Final diagnoses:   Abdominal pain   Diverticulitis     Time reflects when diagnosis was documented in both MDM as applicable and the Disposition within this note       Time User Action Codes Description Comment    5/4/2024  1:24 PM Froilan Royal Add [R10.9] Abdominal pain     5/4/2024  1:24 PM Froilan Royal Add [K57.92] Diverticulitis           ED Disposition       ED Disposition   Admit    Condition   Stable    Date/Time   Sat May 4, 2024 1324    Comment   Case was discussed with MUKESH and the patient's admission status was agreed to be Admission Status: inpatient status to the service of Dr. Bradford.               Follow-up Information    None         Patient's Medications   Discharge Prescriptions    No medications on file       No discharge procedures on file.    PDMP Review       None            ED Provider  Electronically Signed by             Froilan Royal MD  05/04/24 8583

## 2024-05-04 NOTE — H&P
"[facility]  H&P  Name: Amrit Becerra 73 y.o. male I MRN: [id number]  Unit/Bed#: ED-13 I Date of Admission: 5/4/2024   Date of Service: 5/4/2024 I Hospital Day: 0      Assessment/Plan   * Acute diverticulitis  Assessment & Plan  Failed outpatient treatment   CT A/P: acute diverticulitis of the sigmoid colon. No evidence for macroperforation. No associated pericolonic abscess   Given IV Zosyn in the ED, can continue for now   GI consulted   Serial abdominal exam   Clear liquids for now   Pain control   Monitor fever curve, hemodyamics   Without evidence for sepsis at this time     Type 2 diabetes mellitus, without long-term current use of insulin (Former[facility])  Assessment & Plan  Lab Results   Component Value Date    HGBA1C 6.5 (H) 02/23/2024       No results for input(s): \"POCGLU\" in the last 72 hours.    Blood Sugar Average: Last 72 hrs:    Well controlled per last A1c   Hold metformin, SSI, accuchecks     Persistent atrial fibrillation (HCC)  Assessment & Plan  S/p ablation with [employer] pacemaker   Follows with [facility]   Continue sotalol 120 mg bid  and Xarelto 20 mg daily     TAMARA (obstructive sleep apnea)  Assessment & Plan  CPAP nightly - bringin in from home   Follows with Pulm at [facility]     Stage 3a chronic kidney disease (CKD) (Former[facility])  Assessment & Plan  Lab Results   Component Value Date    EGFR 54 05/04/2024    EGFR 42 04/22/2024    EGFR 50 (L) 02/23/2024    CREATININE 1.30 05/04/2024    CREATININE 1.58 (H) 04/22/2024    CREATININE 1.46 (H) 02/23/2024   [facility] with [facility]   Renal function stable on arrival   Monitor BMP     Hepatic steatosis  Assessment & Plan  Noted on CT with hepatomegaly/hepatic steatosis   GI consulted     Cyst of left kidney  Assessment & Plan  Redemonstrated probable complex cyst arising from the posterior interpolar region of the left kidney. Again, nonemergent dedicated renal ultrasound is recommended for further evaluation. "   Will order renal US here     Benign localized prostatic hyperplasia with lower urinary tract symptoms (LUTS)  Assessment & Plan  Follows with Dr. Burt outpatient            VTE Pharmacologic Prophylaxis:   Moderate Risk (Score 3-4) - Pharmacological DVT Prophylaxis Ordered: rivaroxaban (Xarelto).  Code Status: No Order level 3  Discussion with family: Updated  (wife) at bedside.    Anticipated Length of Stay: Patient will be admitted on an inpatient basis with an anticipated length of stay of greater than 2 midnights secondary to acute recurrent divericulitis .    Total Time Spent on Date of Encounter in care of patient: 45 mins. This time was spent on one or more of the following: performing physical exam; counseling and coordination of care; obtaining or reviewing history; documenting in the medical record; reviewing/ordering tests, medications or procedures; communicating with other healthcare professionals and discussing with patient's family/caregivers.    Chief Complaint: abdominal pain, nausea, vomiting     History of Present Illness:  Amrit Becerra is a 73 y.o. male with a PMH of persistent Afib, PPM, chronic AC with xarelto, HTN, hyperlipidemia, stag 3 CKD, hx of diverticulitis  who presents with ongoing abdominal pain, nausea and vomiting. He was here on 4/22 for acute diverticulitis and completed 10 day coruse of Augmentin but had ongoing pain and N/V so came back to ED. CT A/P revealing ongoing acute sigmoid diverticulitis without abscess or perforation. He did vomit once in the ER. He states this is his 4th episode of acute diverticulitis flare. He will be admitted and placed on IV antibitoics and seen by GI.     Review of Systems:  Review of Systems   Constitutional:  Negative for chills, diaphoresis and fever.   Respiratory:  Negative for cough and shortness of breath.    Gastrointestinal:  Positive for abdominal pain, nausea and vomiting. Negative for constipation and diarrhea.    Genitourinary:  Negative for difficulty urinating.   Neurological:  Negative for dizziness and light-headedness.   All other systems reviewed and are negative.      Past Medical and Surgical History:   Past Medical History:   Diagnosis Date    Atrial fibrillation (HCC)     Benign localized prostatic hyperplasia with lower urinary tract symptoms (LUTS)     Bilateral hydrocele     Hyperlipidemia     Hypertension     Other specified depressive episodes     Type 2 diabetes mellitus (HCC)        Past Surgical History:   Procedure Laterality Date    ATRIAL CARDIAC PACEMAKER INSERTION      CARDIAC SURGERY  10/2018    Ablation    CARPAL TUNNEL RELEASE Bilateral 1988    CATARACT EXTRACTION, BILATERAL Bilateral 2015    CYSTOSCOPY  07/2016    HERNIA REPAIR Bilateral 2003    HYDROCELE EXCISION / REPAIR Bilateral 07/2016    THROAT SURGERY  1990    THUMB ARTHROSCOPY Right 2016       Meds/Allergies:  Prior to Admission medications    Medication Sig Start Date End Date Taking? Authorizing Provider   amLODIPine (NORVASC) 10 mg tablet 10 mg 2 (two) times a day 9/1/18   Historical Provider, MD   benzonatate (TESSALON PERLES) 100 mg capsule Take 1 capsule (100 mg total) by mouth every 8 (eight) hours  Patient not taking: Reported on 7/4/2023 4/29/23   Dedrick Frost PA-C   benzonatate (TESSALON PERLES) 100 mg capsule Take 1 capsule (100 mg total) by mouth 3 (three) times a day as needed for cough  Patient not taking: Reported on 4/22/2024 3/5/24   Mer Fuentes PA-C   metFORMIN (GLUCOPHAGE) 500 mg tablet Take 1,000 mg by mouth 2 (two) times a day    Historical Provider, MD   mupirocin (BACTROBAN) 2 % ointment APPLY WITH EACH DRESSING CHANGE TWICE DAILY  Patient not taking: Reported on 4/22/2024 1/19/24   Historical Provider, MD   Omega-3 Fatty Acids (OMEGA-3 FISH OIL) 1000 MG CAPS Take by mouth    Historical Provider, MD   oxyCODONE-acetaminophen (Percocet) 5-325 mg per tablet Take 1 tablet by mouth every 6 (six) hours as needed  for severe pain Max Daily Amount: 4 tablets  Patient not taking: Reported on 11/3/2022 10/7/22   Rudy Packer MD   sertraline (ZOLOFT) 100 mg tablet daily 9/24/18   Historical Provider, MD   sildenafil (VIAGRA) 50 MG tablet TAKE 1 TABLET BY MOUTH ONCE DAILY AS NEEDED APPROXIMATELY 1 HOUR BEFORE SEXUAL ACTIVITY  Patient not taking: Reported on 4/22/2024 2/28/24   Historical Provider, MD   simvastatin (ZOCOR) 5 MG tablet Take 10 mg by mouth daily 9/26/18   Historical Provider, MD   sotalol (BETAPACE) 120 mg tablet 2 (two) times a day 6/19/19   Historical Provider, MD   spironolactone (ALDACTONE) 50 mg tablet Take 25 mg by mouth 2 (two) times a day 12/4/19   Historical Provider, MD   traZODone (DESYREL) 100 mg tablet Take 100 mg by mouth every evening  Patient not taking: Reported on 4/22/2024 12/12/23   Historical Provider, MD   venlafaxine (EFFEXOR-XR) 150 mg 24 hr capsule Take 150 mg by mouth daily  Patient not taking: Reported on 4/22/2024 12/12/23   Historical Provider, MD   XARELTO 20 MG tablet daily 8/9/18   Historical Provider, MD     I have reviewed home medications with patient personally.    Allergies:   Allergies   Allergen Reactions    Valsartan Other (See Comments)    Oxybutynin Palpitations       Social History:  Marital Status: /Civil Union   Occupation:   Patient Pre-hospital Living Situation: Home  Patient Pre-hospital Level of Mobility: walks  Patient Pre-hospital Diet Restrictions:   Substance Use History:   Social History     Substance and Sexual Activity   Alcohol Use No     Social History     Tobacco Use   Smoking Status Never   Smokeless Tobacco Never     Social History     Substance and Sexual Activity   Drug Use No       Family History:  Family History   Problem Relation Age of Onset    Heart disease Mother     Diabetes Sister        Physical Exam:     Vitals:   Blood Pressure: 133/65 (05/04/24 1329)  Pulse: 76 (05/04/24 1329)  Temperature: 98.1 °F (36.7 °C) (05/04/24  0958)  Respirations: 20 (05/04/24 1329)  SpO2: 96 % (05/04/24 1329)    Physical Exam  Vitals and nursing note reviewed.   Constitutional:       General: He is not in acute distress.     Appearance: He is not toxic-appearing.   Cardiovascular:      Rate and Rhythm: Normal rate and regular rhythm.   Pulmonary:      Effort: Pulmonary effort is normal. No respiratory distress.      Breath sounds: Normal breath sounds. No wheezing or rales.   Abdominal:      General: Bowel sounds are normal.      Palpations: Abdomen is soft.      Tenderness: There is abdominal tenderness (lower abdominal pain, no rebound or guarding).   Musculoskeletal:      Right lower leg: No edema.      Left lower leg: No edema.   Skin:     General: Skin is warm.   Neurological:      Mental Status: He is alert. Mental status is at baseline.   Psychiatric:         Mood and Affect: Mood normal.          Additional Data:     Lab Results:  Results from last 7 days   Lab Units 05/04/24  1109   WBC Thousand/uL 15.42*   HEMOGLOBIN g/dL 13.3   HEMATOCRIT % 40.6   PLATELETS Thousands/uL 336   SEGS PCT % 81*   LYMPHO PCT % 9*   MONO PCT % 6   EOS PCT % 2     Results from last 7 days   Lab Units 05/04/24  1109   SODIUM mmol/L 137   POTASSIUM mmol/L 4.5   CHLORIDE mmol/L 104   CO2 mmol/L 27   BUN mg/dL 27*   CREATININE mg/dL 1.30   ANION GAP mmol/L 6   CALCIUM mg/dL 10.4*   ALBUMIN g/dL 4.4   TOTAL BILIRUBIN mg/dL 0.69   ALK PHOS U/L 106*   ALT U/L 100*   AST U/L 24   GLUCOSE RANDOM mg/dL 170*                       Lines/Drains:  Invasive Devices       Peripheral Intravenous Line  Duration             Peripheral IV 05/04/24 Left Antecubital <1 day                        Imaging: Reviewed radiology reports from this admission including: abdominal/pelvic CT  CT abdomen pelvis with contrast   Final Result by Margarito Grimes MD (05/04 1302)      Findings consistent with acute diverticulitis of the sigmoid colon. No evidence for macroperforation. No associated  pericolonic abscess.      Redemonstrated probable complex cyst arising from the posterior interpolar region of the left kidney. Again, nonemergent dedicated renal ultrasound is recommended for further evaluation.      Called physis. No pericholecystic inflammation.      Hepatomegaly/hepatic steatosis.      The study was marked in EPIC for immediate notification.         Workstation performed: [id number]             EKG and Other Studies Reviewed on Admission:   EKG: No EKG obtained.    ** Please Note: This note has been constructed using a voice recognition system. **

## 2024-05-04 NOTE — ASSESSMENT & PLAN NOTE
S/p ablation with [employer] pacemaker   Follows with [facility]   Continue sotalol 120 mg bid  and Xarelto 20 mg daily

## 2024-05-04 NOTE — ASSESSMENT & PLAN NOTE
Lab Results   Component Value Date    EGFR 54 05/04/2024    EGFR 42 04/22/2024    EGFR 50 (L) 02/23/2024    CREATININE 1.30 05/04/2024    CREATININE 1.58 (H) 04/22/2024    CREATININE 1.46 (H) 02/23/2024   Follows with [facility]   Renal function stable on arrival   Monitor BMP

## 2024-05-04 NOTE — ASSESSMENT & PLAN NOTE
"Lab Results   Component Value Date    HGBA1C 6.5 (H) 02/23/2024       No results for input(s): \"POCGLU\" in the last 72 hours.    Blood Sugar Average: Last 72 hrs:    Well controlled per last A1c   Hold metformin, SSI, accuchecks   "

## 2024-05-05 ENCOUNTER — APPOINTMENT (INPATIENT)
Dept: ULTRASOUND IMAGING | Facility: HOSPITAL | Age: 74
DRG: 392 | End: 2024-05-05
Payer: MEDICARE

## 2024-05-05 LAB
ALBUMIN SERPL BCP-MCNC: 3.9 G/DL (ref 3.5–5)
ALP SERPL-CCNC: 97 U/L (ref 34–104)
ALT SERPL W P-5'-P-CCNC: 75 U/L (ref 7–52)
ANION GAP SERPL CALCULATED.3IONS-SCNC: 7 MMOL/L (ref 4–13)
AST SERPL W P-5'-P-CCNC: 20 U/L (ref 13–39)
BASOPHILS # BLD AUTO: 0.04 THOUSANDS/ÂΜL (ref 0–0.1)
BASOPHILS NFR BLD AUTO: 0 % (ref 0–1)
BILIRUB SERPL-MCNC: 1.63 MG/DL (ref 0.2–1)
BUN SERPL-MCNC: 20 MG/DL (ref 5–25)
CALCIUM SERPL-MCNC: 9.5 MG/DL (ref 8.4–10.2)
CHLORIDE SERPL-SCNC: 105 MMOL/L (ref 96–108)
CO2 SERPL-SCNC: 24 MMOL/L (ref 21–32)
CREAT SERPL-MCNC: 1.45 MG/DL (ref 0.6–1.3)
EOSINOPHIL # BLD AUTO: 0.11 THOUSAND/ÂΜL (ref 0–0.61)
EOSINOPHIL NFR BLD AUTO: 1 % (ref 0–6)
ERYTHROCYTE [DISTWIDTH] IN BLOOD BY AUTOMATED COUNT: 13.3 % (ref 11.6–15.1)
GFR SERPL CREATININE-BSD FRML MDRD: 47 ML/MIN/1.73SQ M
GLUCOSE SERPL-MCNC: 123 MG/DL (ref 65–140)
GLUCOSE SERPL-MCNC: 141 MG/DL (ref 65–140)
GLUCOSE SERPL-MCNC: 142 MG/DL (ref 65–140)
GLUCOSE SERPL-MCNC: 199 MG/DL (ref 65–140)
GLUCOSE SERPL-MCNC: 225 MG/DL (ref 65–140)
HCT VFR BLD AUTO: 36.7 % (ref 36.5–49.3)
HGB BLD-MCNC: 12 G/DL (ref 12–17)
IMM GRANULOCYTES # BLD AUTO: 0.05 THOUSAND/UL (ref 0–0.2)
IMM GRANULOCYTES NFR BLD AUTO: 0 % (ref 0–2)
LYMPHOCYTES # BLD AUTO: 1.82 THOUSANDS/ÂΜL (ref 0.6–4.47)
LYMPHOCYTES NFR BLD AUTO: 15 % (ref 14–44)
MCH RBC QN AUTO: 28.8 PG (ref 26.8–34.3)
MCHC RBC AUTO-ENTMCNC: 32.7 G/DL (ref 31.4–37.4)
MCV RBC AUTO: 88 FL (ref 82–98)
MONOCYTES # BLD AUTO: 1.28 THOUSAND/ÂΜL (ref 0.17–1.22)
MONOCYTES NFR BLD AUTO: 10 % (ref 4–12)
NEUTROPHILS # BLD AUTO: 9.28 THOUSANDS/ÂΜL (ref 1.85–7.62)
NEUTS SEG NFR BLD AUTO: 74 % (ref 43–75)
NRBC BLD AUTO-RTO: 0 /100 WBCS
PLATELET # BLD AUTO: 312 THOUSANDS/UL (ref 149–390)
PMV BLD AUTO: 9 FL (ref 8.9–12.7)
POTASSIUM SERPL-SCNC: 4.4 MMOL/L (ref 3.5–5.3)
PROT SERPL-MCNC: 7.2 G/DL (ref 6.4–8.4)
RBC # BLD AUTO: 4.16 MILLION/UL (ref 3.88–5.62)
SODIUM SERPL-SCNC: 136 MMOL/L (ref 135–147)
WBC # BLD AUTO: 12.58 THOUSAND/UL (ref 4.31–10.16)

## 2024-05-05 PROCEDURE — 82948 REAGENT STRIP/BLOOD GLUCOSE: CPT

## 2024-05-05 PROCEDURE — 99223 1ST HOSP IP/OBS HIGH 75: CPT | Performed by: INTERNAL MEDICINE

## 2024-05-05 PROCEDURE — 99232 SBSQ HOSP IP/OBS MODERATE 35: CPT | Performed by: PHYSICIAN ASSISTANT

## 2024-05-05 PROCEDURE — 80053 COMPREHEN METABOLIC PANEL: CPT | Performed by: PHYSICIAN ASSISTANT

## 2024-05-05 PROCEDURE — 85025 COMPLETE CBC W/AUTO DIFF WBC: CPT | Performed by: PHYSICIAN ASSISTANT

## 2024-05-05 PROCEDURE — 76775 US EXAM ABDO BACK WALL LIM: CPT

## 2024-05-05 RX ORDER — HYDROMORPHONE HCL IN WATER/PF 6 MG/30 ML
0.2 PATIENT CONTROLLED ANALGESIA SYRINGE INTRAVENOUS
Status: DISCONTINUED | OUTPATIENT
Start: 2024-05-05 | End: 2024-05-05

## 2024-05-05 RX ORDER — HYDROMORPHONE HCL IN WATER/PF 6 MG/30 ML
0.2 PATIENT CONTROLLED ANALGESIA SYRINGE INTRAVENOUS
Status: DISCONTINUED | OUTPATIENT
Start: 2024-05-05 | End: 2024-05-06 | Stop reason: HOSPADM

## 2024-05-05 RX ADMIN — SOTALOL HYDROCHLORIDE 120 MG: 120 TABLET ORAL at 21:38

## 2024-05-05 RX ADMIN — INSULIN LISPRO 3 UNITS: 100 INJECTION, SOLUTION INTRAVENOUS; SUBCUTANEOUS at 21:38

## 2024-05-05 RX ADMIN — OMEGA-3 FATTY ACIDS CAP 1000 MG 1000 MG: 1000 CAP at 15:40

## 2024-05-05 RX ADMIN — OMEGA-3 FATTY ACIDS CAP 1000 MG 1000 MG: 1000 CAP at 07:50

## 2024-05-05 RX ADMIN — PIPERACILLIN SODIUM AND TAZOBACTAM SODIUM 4.5 G: 36; 4.5 INJECTION, POWDER, LYOPHILIZED, FOR SOLUTION INTRAVENOUS at 15:40

## 2024-05-05 RX ADMIN — PRAVASTATIN SODIUM 20 MG: 20 TABLET ORAL at 15:40

## 2024-05-05 RX ADMIN — PIPERACILLIN SODIUM AND TAZOBACTAM SODIUM 4.5 G: 36; 4.5 INJECTION, POWDER, LYOPHILIZED, FOR SOLUTION INTRAVENOUS at 01:05

## 2024-05-05 RX ADMIN — RIVAROXABAN 20 MG: 20 TABLET, FILM COATED ORAL at 15:40

## 2024-05-05 RX ADMIN — SPIRONOLACTONE 25 MG: 25 TABLET ORAL at 21:38

## 2024-05-05 RX ADMIN — AMLODIPINE BESYLATE 10 MG: 10 TABLET ORAL at 21:38

## 2024-05-05 RX ADMIN — INSULIN LISPRO 2 UNITS: 100 INJECTION, SOLUTION INTRAVENOUS; SUBCUTANEOUS at 12:09

## 2024-05-05 RX ADMIN — SOTALOL HYDROCHLORIDE 120 MG: 120 TABLET ORAL at 07:50

## 2024-05-05 RX ADMIN — SPIRONOLACTONE 25 MG: 25 TABLET ORAL at 07:50

## 2024-05-05 RX ADMIN — PIPERACILLIN SODIUM AND TAZOBACTAM SODIUM 4.5 G: 36; 4.5 INJECTION, POWDER, LYOPHILIZED, FOR SOLUTION INTRAVENOUS at 07:50

## 2024-05-05 RX ADMIN — AMLODIPINE BESYLATE 10 MG: 10 TABLET ORAL at 07:50

## 2024-05-05 NOTE — ASSESSMENT & PLAN NOTE
Redemonstrated probable complex cyst arising from the posterior interpolar region of the left kidney. Again, nonemergent dedicated renal ultrasound is recommended for further evaluation.   ordered renal US here - pending

## 2024-05-05 NOTE — PLAN OF CARE
Problem: Potential for Falls  Goal: Patient will remain free of falls  Description: INTERVENTIONS:  - Educate patient/family on patient safety including physical limitations  - Instruct patient to call for assistance with activity   - Consult OT/PT to assist with strengthening/mobility   - Keep Call bell within reach  - Keep bed low and locked with side rails adjusted as appropriate  - Keep care items and personal belongings within reach  - Initiate and maintain comfort rounds  - Make Fall Risk Sign visible to staff  - Offer Toileting every 2 Hours, in advance of need  - Initiate/Maintain bed alarm  - Obtain necessary fall risk management equipment:   - Apply yellow socks and bracelet for high fall risk patients  - Consider moving patient to room near nurses station  Outcome: Progressing     Problem: PAIN - ADULT  Goal: Verbalizes/displays adequate comfort level or baseline comfort level  Description: Interventions:  - Encourage patient to monitor pain and request assistance  - Assess pain using appropriate pain scale  - Administer analgesics based on type and severity of pain and evaluate response  - Implement non-pharmacological measures as appropriate and evaluate response  - Consider cultural and social influences on pain and pain management  - Notify physician/advanced practitioner if interventions unsuccessful or patient reports new pain  Outcome: Progressing     Problem: INFECTION - ADULT  Goal: Absence or prevention of progression during hospitalization  Description: INTERVENTIONS:  - Assess and monitor for signs and symptoms of infection  - Monitor lab/diagnostic results  - Monitor all insertion sites, i.e. indwelling lines, tubes, and drains  - Monitor endotracheal if appropriate and nasal secretions for changes in amount and color  - Alexander appropriate cooling/warming therapies per order  - Administer medications as ordered  - Instruct and encourage patient and family to use good hand hygiene  technique  - Identify and instruct in appropriate isolation precautions for identified infection/condition  Outcome: Progressing  Goal: Absence of fever/infection during neutropenic period  Description: INTERVENTIONS:  - Monitor WBC    Outcome: Progressing     Problem: SAFETY ADULT  Goal: Patient will remain free of falls  Description: INTERVENTIONS:  - Educate patient/family on patient safety including physical limitations  - Instruct patient to call for assistance with activity   - Consult OT/PT to assist with strengthening/mobility   - Keep Call bell within reach  - Keep bed low and locked with side rails adjusted as appropriate  - Keep care items and personal belongings within reach  - Initiate and maintain comfort rounds  - Make Fall Risk Sign visible to staff  - Offer Toileting every 2 Hours, in advance of need  - Initiate/Maintain bed alarm  - Obtain necessary fall risk management equipment:   - Apply yellow socks and bracelet for high fall risk patients  - Consider moving patient to room near nurses station  Outcome: Progressing  Goal: Maintain or return to baseline ADL function  Description: INTERVENTIONS:  -  Assess patient's ability to carry out ADLs; assess patient's baseline for ADL function and identify physical deficits which impact ability to perform ADLs (bathing, care of mouth/teeth, toileting, grooming, dressing, etc.)  - Assess/evaluate cause of self-care deficits   - Assess range of motion  - Assess patient's mobility; develop plan if impaired  - Assess patient's need for assistive devices and provide as appropriate  - Encourage maximum independence but intervene and supervise when necessary  - Involve family in performance of ADLs  - Assess for home care needs following discharge   - Consider OT consult to assist with ADL evaluation and planning for discharge  - Provide patient education as appropriate  Outcome: Progressing  Goal: Maintains/Returns to pre admission functional  level  Description: INTERVENTIONS:  - Perform AM-PAC 6 Click Basic Mobility/ Daily Activity assessment daily.  - Set and communicate daily mobility goal to care team and patient/family/caregiver.   - Collaborate with rehabilitation services on mobility goals if consulted  - Perform Range of Motion 3 times a day.  - Reposition patient every 2 hours.  - Dangle patient 3 times a day  - Stand patient 3 times a day  - Ambulate patient 3 times a day  - Out of bed to chair 3 times a day   - Out of bed for meals 3 times a day  - Out of bed for toileting  - Record patient progress and toleration of activity level   Outcome: Progressing

## 2024-05-05 NOTE — PROGRESS NOTES
[facility]  Progress Note  Name: Amrit Becerra I  MRN: [id number]  Unit/Bed#: [place] [address] -01 I Date of Admission: 5/4/2024   Date of Service: 5/5/2024 I Hospital Day: 1    Assessment/Plan   * Acute diverticulitis  Assessment & Plan  Failed outpatient treatment after 10 days Augmentin presented with worsening LLQ pain   CT A/P: acute diverticulitis of the sigmoid colon. No evidence for macroperforation. No associated pericolonic abscess   Given IV Zosyn in the ED,continue with Zosyn for now, day 2   Blood cultures pending   GI consulted   Serial abdominal exam   Will trial surgical soft diet this morning   Pain control   Monitor fever curve, hemodyamics   Has outpt scheduled follow up with Gen Surgery May 14th     Type 2 diabetes mellitus, without long-term current use of insulin ([facility])  Assessment & Plan  Lab Results   Component Value Date    HGBA1C 6.5 (H) 02/23/2024       Recent Labs     05/04/24  1813 05/04/24  2058 05/05/24  0746   POCGLU 121 145* 141*       Blood Sugar Average: Last 72 hrs:  (P) [id number]  Well controlled per last A1c   Hold metformin, SSI, accuchecks     Persistent atrial fibrillation (HCC)  Assessment & Plan  S/p ablation with [employer] pacemaker   [facility]   Continue sotalol 120 mg bid  and Xarelto 20 mg daily     TAMARA (obstructive sleep apnea)  Assessment & Plan  CPAP nightly - bringing in from home, declined using our cpap   Follow[facility] with [facility]m at [facility]     Stage 3a chronic kidney disease (CKD) (HCC)  Assessment & Plan  Lab Results   Component Value Date    EGFR 47 05/05/2024    EGFR 54 05/04/2024    EGFR 42 04/22/2024    CREATININE 1.45 (H) 05/05/2024    CREATININE 1.30 05/04/2024    CREATININE 1.58 (H) 04/22/2024   Follows with [facility]   Renal function within baseline 1.3-1.5   Monitor BMP     Hepatic steatosis  Assessment & Plan  Noted on CT with hepatomegaly/hepatic steatosis   GI consulted     Cyst  of left kidney  Assessment & Plan  Redemonstrated probable complex cyst arising from the posterior interpolar region of the left kidney. Again, nonemergent dedicated renal ultrasound is recommended for further evaluation.   ordered renal US here - pending     Benign localized prostatic hyperplasia with lower urinary tract symptoms (LUTS)  Assessment & Plan  Follows with Dr. Burt outpatient              VTE Pharmacologic Prophylaxis: VTE Score: 4 Moderate Risk (Score 3-4) - Pharmacological DVT Prophylaxis Ordered: rivaroxaban (Xarelto).    Mobility:   Basic Mobility Inpatient Raw Score: 23  JH-HLM Goal: 7: Walk 25 feet or more  JH-HLM Achieved: 7: Walk 25 feet or more  JH-HLM Goal achieved. Continue to encourage appropriate mobility.    Patient Centered Rounds: I performed bedside rounds with nursing staff today.   Discussions with Specialists or Other Care Team Provider: will follow up gi recs     Education and Discussions with Family / Patient: Updated  (wife) at bedside.    Total Time Spent on Date of Encounter in care of patient:  mins. This time was spent on one or more of the following: performing physical exam; counseling and coordination of care; obtaining or reviewing history; documenting in the medical record; reviewing/ordering tests, medications or procedures; communicating with other healthcare professionals and discussing with patient's family/caregivers.    Current Length of Stay: 1 day(s)  Current Patient Status: Inpatient   Certification Statement: The patient will continue to require additional inpatient hospital stay due to acute recurrent diverticulitis   Discharge Plan: Anticipate discharge in 24-48 hrs to home.    Code Status: Level 3 - DNAR and DNI    Subjective:   Doing okay. Pain comes and goes. Currently 5/10 lower abdomen. Having daily normal Bms. No N/V. Asking to eat more than clears as he is hungry. No fevers, chills    Objective:     Vitals:   Temp (24hrs), Av.5 °F  (36.9 °C), Min:98 °F (36.7 °C), Max:99.5 °F (37.5 °C)    Temp:  [98 °F (36.7 °C)-99.5 °F (37.5 °C)] 99.5 °F (37.5 °C)  HR:  [62-76] 66  Resp:  [16-20] 17  BP: (117-156)/(60-72) 117/60  SpO2:  [93 %-97 %] 93 %  Body mass index is 28.83 kg/m².     Input and Output Summary (last 24 hours):     Intake/Output Summary (Last 24 hours) at 5/5/2024 0754  Last data filed at 5/5/2024 0701  Gross per 24 hour   Intake 1370 ml   Output --   Net 1370 ml       Physical Exam:   Physical Exam  Vitals and nursing note reviewed.   Cardiovascular:      Rate and Rhythm: Normal rate and regular rhythm.   Pulmonary:      Effort: Pulmonary effort is normal.      Breath sounds: Normal breath sounds.   Abdominal:      General: Bowel sounds are normal. There is no distension.      Palpations: Abdomen is soft.      Tenderness: There is no abdominal tenderness. There is no guarding or rebound.   Musculoskeletal:      Right lower leg: No edema.      Left lower leg: No edema.   Skin:     General: Skin is warm.   Neurological:      Mental Status: He is alert. Mental status is at baseline.   Psychiatric:         Mood and Affect: Mood normal.          Additional Data:     Labs:  Results from last 7 days   Lab Units 05/05/24  0604   WBC Thousand/uL 12.58*   HEMOGLOBIN g/dL 12.0   HEMATOCRIT % 36.7   PLATELETS Thousands/uL 312   SEGS PCT % 74   LYMPHO PCT % 15   MONO PCT % 10   EOS PCT % 1     Results from last 7 days   Lab Units 05/05/24  0604   SODIUM mmol/L 136   POTASSIUM mmol/L 4.4   CHLORIDE mmol/L 105   CO2 mmol/L 24   BUN mg/dL 20   CREATININE mg/dL 1.45*   ANION GAP mmol/L 7   CALCIUM mg/dL 9.5   ALBUMIN g/dL 3.9   TOTAL BILIRUBIN mg/dL 1.63*   ALK PHOS U/L 97   ALT U/L 75*   AST U/L 20   GLUCOSE RANDOM mg/dL 142*         Results from last 7 days   Lab Units 05/05/24  0746 05/04/24 2058 05/04/24  1813   POC GLUCOSE mg/dl 141* 145* 121               Lines/Drains:  Invasive Devices       Peripheral Intravenous Line  Duration              Peripheral IV 05/04/24 Left Antecubital <1 day                          Imaging: Reviewed radiology reports from this admission including: abdominal/pelvic CT    Recent Cultures (last 7 days):   Results from last 7 days   Lab Units 05/04/24  1216 05/04/24  1213   BLOOD CULTURE  Received in Microbiology Lab. Culture in Progress. Received in Microbiology Lab. Culture in Progress.       Last 24 Hours Medication List:   Current Facility-Administered Medications   Medication Dose Route Frequency Provider Last Rate    acetaminophen  650 mg Oral Q6H PRN Lesly Krause PA-C      amLODIPine  10 mg Oral BID Lesly Krause PA-C      fish oil  1,000 mg Oral BID Lesly Krause PA-C      HYDROmorphone  0.2 mg Intravenous Q3H PRN Lesly Krause PA-C      insulin lispro  1-6 Units Subcutaneous TID AC Lesly Krause PA-C      insulin lispro  1-6 Units Subcutaneous HS Lesly Krause PA-C      ondansetron  4 mg Intravenous Q6H PRN Lesly Krause PA-C      oxyCODONE  5 mg Oral Q6H PRN Lesly Krause, TOI      oxyCODONE  2.5 mg Oral Q4H PRN Lesly Krause PA-C      piperacillin-tazobactam  4.5 g Intravenous Q8H Lesly Krause PA-C      polyethylene glycol  17 g Oral Daily PRN Lesly Krause PA-C      pravastatin  20 mg Oral Daily With Dinner Lesly Krause PA-C      rivaroxaban  20 mg Oral Daily With Dinner Lesly Krause PA-C      sotalol  120 mg Oral Q12H MU Lesly Krause PA-C      spironolactone  25 mg Oral BID Lesly Krause PA-C          Today, Patient Was Seen By: Lesly Krause PA-C    **Please Note: This note may have been constructed using a voice recognition system.**

## 2024-05-05 NOTE — CONSULTS
Consultation -  [facility]  Amrit Becerra 73 y.o. male MRN: [id number]  Unit/Bed#: [address]-01 Encounter: [id number]            Inpatient consult to gastroenterology     Date/Time  5/5/2024 2:11 PM     Performed by  Marialuisa Barrera DO   Authorized by  Lesly Krause PA-C             Reason for Consult / Principal Problem:     Diverticulitis    ASSESSMENT AND PLAN:      73-year-old male with history significant for type 2 diabetes, A-fib status post ablation on Xarelto, CKD stage III, TAMARA, and hepatic steatosis, presenting with recurrent left lower quadrant pain with nausea and vomiting, found to have recurrent uncomplicated diverticulitis involving the sigmoid colon.  GI is asked to evaluate due to recurrent diverticulitis.    He remains afebrile and clinically stable on IV antibiotics with improvement in his abdominal pain. Given this is his fourth recurrence of diverticulitis, we discussed outpatient consultation with colorectal surgery for evaluation of potential resection to prevent future flares and complications.  Patient is agreeable with this plan.  Leukocytosis improving.  He reports having a colonoscopy 1 month ago with normal results, however, we do not have records within our system.    Monitor for continued improvement on IV antibiotics.  If he continues to improve over the next 24 hours with resolution of abdominal pain and tolerating p.o. intake, can transition to oral antibiotics for a total duration of 10 to 14 days.  Advance diet as tolerated.  Needs outpatient colorectal surgery referral to discuss resection in the setting of recurrent diverticulitis.    Rest of care per primary team.  Thank you for this consultation.   ______________________________________________________________________    HPI: Amrit Becerra is a 73-year-old male with history significant for type 2 diabetes, A-fib status post ablation on Xarelto, CKD stage III, TAMARA, and hepatic steatosis, whom we are asked  to see in consultation for diverticulitis.    Patient presented to the hospital with recurrent left lower quadrant abdominal pain with nausea and vomiting.  He reports being diagnosed with acute recurrent diverticulitis on 4/22 for which he was treated with a 10-day course of Augmentin and discharged home.  He reports initial resolution of the abdominal pain and diverticulitis flare.  However he reports recurrence of his abdominal pain and nausea and vomiting over the last several days prompting reevaluation in the ED.  He reports subjective fevers at home and decreased oral intake.  Denies any hematochezia or melena.  Of note, he states this is his fourth flare of diverticulitis.  He was evaluated by his PCP for this in the past and was most recently recommended to undergo evaluation with colorectal surgery for surgical resection given recurrent flares.  He reports having a colonoscopy 1 month ago which was unremarkable, however, we do not have records of this procedure or the results      REVIEW OF SYSTEMS:  10 point ROS reviewed and negative except otherwise noted in the HPI above.     Historical Information   Past Medical History:   Diagnosis Date    Atrial fibrillation (HCC)     Benign localized prostatic hyperplasia with lower urinary tract symptoms (LUTS)     Bilateral hydrocele     Hyperlipidemia     Hypertension     Other specified depressive episodes     Type 2 diabetes mellitus (HCC)      Past Surgical History:   Procedure Laterality Date    ATRIAL CARDIAC PACEMAKER INSERTION      CARDIAC SURGERY  10/2018    Ablation    CARPAL TUNNEL RELEASE Bilateral 1988    CATARACT EXTRACTION, BILATERAL Bilateral 2015    CYSTOSCOPY  07/2016    HERNIA REPAIR Bilateral 2003    HYDROCELE EXCISION / REPAIR Bilateral 07/2016    THROAT SURGERY  1990    THUMB ARTHROSCOPY Right 2016     Social History   Social History     Substance and Sexual Activity   Alcohol Use No     Social History     Substance and Sexual Activity   Drug  Use No     Social History     Tobacco Use   Smoking Status Never   Smokeless Tobacco Never     Family History   Problem Relation Age of Onset    Heart disease Mother     Diabetes Sister        Meds/Allergies     Medications Prior to Admission   Medication    amLODIPine (NORVASC) 10 mg tablet    benzonatate (TESSALON PERLES) 100 mg capsule    benzonatate (TESSALON PERLES) 100 mg capsule    metFORMIN (GLUCOPHAGE) 500 mg tablet    mupirocin (BACTROBAN) 2 % ointment    Omega-3 Fatty Acids (OMEGA-3 FISH OIL) 1000 MG CAPS    oxyCODONE-acetaminophen (Percocet) 5-325 mg per tablet    sertraline (ZOLOFT) 100 mg tablet    sildenafil (VIAGRA) 50 MG tablet    simvastatin (ZOCOR) 5 MG tablet    sotalol (BETAPACE) 120 mg tablet    spironolactone (ALDACTONE) 50 mg tablet    traZODone (DESYREL) 100 mg tablet    venlafaxine (EFFEXOR-XR) 150 mg 24 hr capsule    XARELTO 20 MG tablet     Current Facility-Administered Medications   Medication Dose Route Frequency    acetaminophen (TYLENOL) tablet 650 mg  650 mg Oral Q6H PRN    amLODIPine (NORVASC) tablet 10 mg  10 mg Oral BID    fish oil capsule 1,000 mg  1,000 mg Oral BID    HYDROmorphone HCl (DILAUDID) injection 0.2 mg  0.2 mg Intravenous Q3H PRN    insulin lispro (HumALOG/ADMELOG) 100 units/mL subcutaneous injection 1-6 Units  1-6 Units Subcutaneous TID AC    insulin lispro (HumALOG/ADMELOG) 100 units/mL subcutaneous injection 1-6 Units  1-6 Units Subcutaneous HS    ondansetron (ZOFRAN) injection 4 mg  4 mg Intravenous Q6H PRN    oxyCODONE (ROXICODONE) IR tablet 5 mg  5 mg Oral Q6H PRN    oxyCODONE (ROXICODONE) split tablet 2.5 mg  2.5 mg Oral Q4H PRN    piperacillin-tazobactam (ZOSYN) IVPB (EXTENDED INFUSION) 4.5 g  4.5 g Intravenous Q8H    polyethylene glycol (MIRALAX) packet 17 g  17 g Oral Daily PRN    pravastatin (PRAVACHOL) tablet 20 mg  20 mg Oral Daily With Dinner    rivaroxaban (XARELTO) tablet 20 mg  20 mg Oral Daily With Dinner    sotalol (BETAPACE) tablet 120 mg  120 mg  "Oral Q12H MU    spironolactone (ALDACTONE) tablet 25 mg  25 mg Oral BID       Allergies   Allergen Reactions    Valsartan Other (See Comments)    Oxybutynin Palpitations         Objective     Blood pressure 117/60, pulse 66, temperature 98.9 °F (37.2 °C), temperature source Axillary, resp. rate 17, height 5' 6\" (1.676 m), weight 81 kg (178 lb 9.6 oz), SpO2 93%. Body mass index is 28.83 kg/m².    PHYSICAL EXAM:    General: Well-appearing, NAD  Eyes: no conjunctival icterus or pallor  Abdominal: Soft, TTP lower abdomen, non-distended  Extremities: Warm, no deformities, no edema  Neuro: alert and oriented  Psych: Normal affect    Lab Results:   Admission on 05/04/2024   Component Date Value    Sodium 05/04/2024 137     Potassium 05/04/2024 4.5     Chloride 05/04/2024 104     CO2 05/04/2024 27     ANION GAP 05/04/2024 6     BUN 05/04/2024 27 (H)     Creatinine 05/04/2024 1.30     Glucose 05/04/2024 170 (H)     Calcium 05/04/2024 10.4 (H)     eGFR 05/04/2024 54     WBC 05/04/2024 15.42 (H)     RBC 05/04/2024 4.53     Hemoglobin 05/04/2024 13.3     Hematocrit 05/04/2024 40.6     MCV 05/04/2024 90     MCH 05/04/2024 29.4     MCHC 05/04/2024 32.8     RDW 05/04/2024 13.2     MPV 05/04/2024 8.7 (L)     Platelets 05/04/2024 336     nRBC 05/04/2024 0     Segmented % 05/04/2024 81 (H)     Immature Grans % 05/04/2024 1     Lymphocytes % 05/04/2024 9 (L)     Monocytes % 05/04/2024 6     Eosinophils Relative 05/04/2024 2     Basophils Relative 05/04/2024 1     Absolute Neutrophils 05/04/2024 12.73 (H)     Absolute Immature Grans 05/04/2024 0.11     Absolute Lymphocytes 05/04/2024 1.33     Absolute Monocytes 05/04/2024 0.92     Eosinophils Absolute 05/04/2024 0.26     Basophils Absolute 05/04/2024 0.07     Total Bilirubin 05/04/2024 0.69     Bilirubin, Direct 05/04/2024 0.15     Alkaline Phosphatase 05/04/2024 106 (H)     AST 05/04/2024 24     ALT 05/04/2024 100 (H)     Total Protein 05/04/2024 8.0     Albumin 05/04/2024 4.4     " Lipase 05/04/2024 122 (H)     Color, UA 05/04/2024 Yellow     Clarity, UA 05/04/2024 Clear     pH, UA 05/04/2024 5.5     Leukocytes, UA 05/04/2024 Negative     Nitrite, UA 05/04/2024 Negative     Protein, UA 05/04/2024 30 (1+) (A)     Glucose, UA 05/04/2024 Negative     Ketones, UA 05/04/2024 Negative     Urobilinogen, UA 05/04/2024 0.2     Bilirubin, UA 05/04/2024 Negative     Occult Blood, UA 05/04/2024 Negative     Specific Gravity, UA 05/04/2024 1.020     RBC, UA 05/04/2024 1-2     WBC, UA 05/04/2024 1-2     Epithelial Cells 05/04/2024 None Seen     Bacteria, UA 05/04/2024 None Seen     Hyaline Casts, UA 05/04/2024 10-25 (A)     Blood Culture 05/04/2024 Received in Microbiology Lab. Culture in Progress.     Blood Culture 05/04/2024 Received in Microbiology Lab. Culture in Progress.     POC Glucose 05/04/2024 121     POC Glucose 05/04/2024 145 (H)     Sodium 05/05/2024 136     Potassium 05/05/2024 4.4     Chloride 05/05/2024 105     CO2 05/05/2024 24     ANION GAP 05/05/2024 7     BUN 05/05/2024 20     Creatinine 05/05/2024 1.45 (H)     Glucose 05/05/2024 142 (H)     Calcium 05/05/2024 9.5     AST 05/05/2024 20     ALT 05/05/2024 75 (H)     Alkaline Phosphatase 05/05/2024 97     Total Protein 05/05/2024 7.2     Albumin 05/05/2024 3.9     Total Bilirubin 05/05/2024 1.63 (H)     eGFR 05/05/2024 47     WBC 05/05/2024 12.58 (H)     RBC 05/05/2024 4.16     Hemoglobin 05/05/2024 12.0     Hematocrit 05/05/2024 36.7     MCV 05/05/2024 88     MCH 05/05/2024 28.8     MCHC 05/05/2024 32.7     RDW 05/05/2024 13.3     MPV 05/05/2024 9.0     Platelets 05/05/2024 312     nRBC 05/05/2024 0     Segmented % 05/05/2024 74     Immature Grans % 05/05/2024 0     Lymphocytes % 05/05/2024 15     Monocytes % 05/05/2024 10     Eosinophils Relative 05/05/2024 1     Basophils Relative 05/05/2024 0     Absolute Neutrophils 05/05/2024 9.28 (H)     Absolute Immature Grans 05/05/2024 0.05     Absolute Lymphocytes 05/05/2024 1.82     Absolute  Monocytes 05/05/2024 1.28 (H)     Eosinophils Absolute 05/05/2024 0.11     Basophils Absolute 05/05/2024 0.04     POC Glucose 05/05/2024 141 (H)     POC Glucose 05/05/2024 199 (H)        Imaging Studies: I have personally reviewed pertinent imaging studies.    Marialuisa Barrera D.O.  Fellow, PGY-5  Division of Gastroenterology & Hepatology  Available on [facility]  5/5/2024 12:52 PM

## 2024-05-05 NOTE — ASSESSMENT & PLAN NOTE
Failed outpatient treatment after 10 days Augmentin presented with worsening LLQ pain   CT A/P: acute diverticulitis of the sigmoid colon. No evidence for macroperforation. No associated pericolonic abscess   Placed on IV zosyn   Has not needed any pain medication since coming to the floor   Blood cultures negative   Spoke with GI this morning   He is tolerating diet and pain controlled   Plan to complete 14 day course abx since this is his second occurrence with ciprofloxacin and metronidazole  He has follow-up with colorectal surgery May 14 to discuss surgical options  Return for any worsening pain, inability tolerate oral intake

## 2024-05-05 NOTE — ASSESSMENT & PLAN NOTE
CPAP nightly - bringing in from home, declined using our cpap   Follows with Pulm at [facility]

## 2024-05-05 NOTE — ASSESSMENT & PLAN NOTE
CPAP nightly - bringing in from home, declined using our cpap   Follows with Pulm at [facility]

## 2024-05-05 NOTE — PLAN OF CARE
Problem: INFECTION - ADULT  Goal: Absence or prevention of progression during hospitalization  Description: INTERVENTIONS:  - Assess and monitor for signs and symptoms of infection  - Monitor lab/diagnostic results  - Monitor all insertion sites, i.e. indwelling lines, tubes, and drains  - Monitor endotracheal if appropriate and nasal secretions for changes in amount and color  - [place] appropriate cooling/warming therapies per order  - Administer medications as ordered  - Instruct and encourage patient and family to use good hand hygiene technique  - Identify and instruct in appropriate isolation precautions for identified infection/condition  Outcome: Progressing  Goal: Absence of fever/infection during neutropenic period  Description: INTERVENTIONS:  - Monitor WBC    Outcome: Progressing

## 2024-05-05 NOTE — ASSESSMENT & PLAN NOTE
Noted on CT with hepatomegaly/hepatic steatosis   Weight loss and lifestyle modifications, seen by gastroenterology while here, outpatient follow-up

## 2024-05-05 NOTE — ASSESSMENT & PLAN NOTE
Lab Results   Component Value Date    HGBA1C 6.5 (H) 02/23/2024       Recent Labs     05/04/24  1813 05/04/24 2058 05/05/24  0746   POCGLU 121 145* 141*       Blood Sugar Average: Last 72 hrs:  (P) 135.2801247019084644  Well controlled per last A1c   Hold metformin, SSI, accuchecks

## 2024-05-05 NOTE — ASSESSMENT & PLAN NOTE
Lab Results   Component Value Date    EGFR 44 05/06/2024    EGFR 47 05/05/2024    EGFR 54 05/04/2024    CREATININE 1.53 (H) 05/06/2024    CREATININE 1.45 (H) 05/05/2024    CREATININE 1.30 05/04/2024   [facility]   Renal function within baseline 1.3-1.5

## 2024-05-05 NOTE — ASSESSMENT & PLAN NOTE
Failed outpatient treatment after 10 days Augmentin presented with worsening LLQ pain   CT A/P: acute diverticulitis of the sigmoid colon. No evidence for macroperforation. No associated pericolonic abscess   Given IV Zosyn in the ED,continue with Zosyn for now, day 2   Blood cultures pending   GI consulted   Serial abdominal exam   Clear liquids , advance as tolerated   Pain control   Monitor fever curve, hemodyamics   Has outpt scheduled follow up with Gen Surgery May 14th

## 2024-05-05 NOTE — ASSESSMENT & PLAN NOTE
Redemonstrated probable complex cyst arising from the posterior interpolar region of the left kidney. Again, nonemergent dedicated renal ultrasound is recommended for further evaluation.   Renal ultrasound ordered here Upper pole simple cyst measuring 2.0 x 2.7 x 1.8 cm. Lower pole simple cyst measuring 2.1 x 1.7 x 2.1 cm

## 2024-05-05 NOTE — ASSESSMENT & PLAN NOTE
Lab Results   Component Value Date    EGFR 47 05/05/2024    EGFR 54 05/04/2024    EGFR 42 04/22/2024    CREATININE 1.45 (H) 05/05/2024    CREATININE 1.30 05/04/2024    CREATININE 1.58 (H) 04/22/2024   [facility]   Renal function within baseline 1.3-1.5   Monitor BMP

## 2024-05-05 NOTE — ASSESSMENT & PLAN NOTE
Lab Results   Component Value Date    HGBA1C 6.5 (H) 02/23/2024       Recent Labs     05/04/24  1813 05/04/24 2058 05/05/24  0746 05/05/24  1127   POCGLU 121 145* 141* 199*         Blood Sugar Average: Last 72 hrs:  (P) 151.5  Continue metformin

## 2024-05-06 VITALS
HEIGHT: 66 IN | DIASTOLIC BLOOD PRESSURE: 61 MMHG | SYSTOLIC BLOOD PRESSURE: 115 MMHG | HEART RATE: 62 BPM | WEIGHT: 178.6 LBS | TEMPERATURE: 98.1 F | RESPIRATION RATE: 16 BRPM | BODY MASS INDEX: 28.7 KG/M2 | OXYGEN SATURATION: 93 %

## 2024-05-06 LAB
ALBUMIN SERPL BCP-MCNC: 3.9 G/DL (ref 3.5–5)
ALP SERPL-CCNC: 82 U/L (ref 34–104)
ALT SERPL W P-5'-P-CCNC: 55 U/L (ref 7–52)
ANION GAP SERPL CALCULATED.3IONS-SCNC: 12 MMOL/L (ref 4–13)
AST SERPL W P-5'-P-CCNC: 17 U/L (ref 13–39)
BASOPHILS # BLD AUTO: 0.06 THOUSANDS/ÂΜL (ref 0–0.1)
BASOPHILS NFR BLD AUTO: 1 % (ref 0–1)
BILIRUB SERPL-MCNC: 1.16 MG/DL (ref 0.2–1)
BUN SERPL-MCNC: 21 MG/DL (ref 5–25)
CALCIUM SERPL-MCNC: 9.2 MG/DL (ref 8.4–10.2)
CHLORIDE SERPL-SCNC: 106 MMOL/L (ref 96–108)
CO2 SERPL-SCNC: 20 MMOL/L (ref 21–32)
CREAT SERPL-MCNC: 1.53 MG/DL (ref 0.6–1.3)
EOSINOPHIL # BLD AUTO: 0.25 THOUSAND/ÂΜL (ref 0–0.61)
EOSINOPHIL NFR BLD AUTO: 3 % (ref 0–6)
ERYTHROCYTE [DISTWIDTH] IN BLOOD BY AUTOMATED COUNT: 13.2 % (ref 11.6–15.1)
GFR SERPL CREATININE-BSD FRML MDRD: 44 ML/MIN/1.73SQ M
GLUCOSE SERPL-MCNC: 140 MG/DL (ref 65–140)
GLUCOSE SERPL-MCNC: 92 MG/DL (ref 65–140)
HCT VFR BLD AUTO: 38.3 % (ref 36.5–49.3)
HGB BLD-MCNC: 11.9 G/DL (ref 12–17)
IMM GRANULOCYTES # BLD AUTO: 0.04 THOUSAND/UL (ref 0–0.2)
IMM GRANULOCYTES NFR BLD AUTO: 0 % (ref 0–2)
LYMPHOCYTES # BLD AUTO: 2.05 THOUSANDS/ÂΜL (ref 0.6–4.47)
LYMPHOCYTES NFR BLD AUTO: 20 % (ref 14–44)
MCH RBC QN AUTO: 28.9 PG (ref 26.8–34.3)
MCHC RBC AUTO-ENTMCNC: 31.1 G/DL (ref 31.4–37.4)
MCV RBC AUTO: 93 FL (ref 82–98)
MONOCYTES # BLD AUTO: 1.1 THOUSAND/ÂΜL (ref 0.17–1.22)
MONOCYTES NFR BLD AUTO: 11 % (ref 4–12)
NEUTROPHILS # BLD AUTO: 6.61 THOUSANDS/ÂΜL (ref 1.85–7.62)
NEUTS SEG NFR BLD AUTO: 65 % (ref 43–75)
NRBC BLD AUTO-RTO: 0 /100 WBCS
PLATELET # BLD AUTO: 304 THOUSANDS/UL (ref 149–390)
PMV BLD AUTO: 9.2 FL (ref 8.9–12.7)
POTASSIUM SERPL-SCNC: 4.5 MMOL/L (ref 3.5–5.3)
PROT SERPL-MCNC: 7.2 G/DL (ref 6.4–8.4)
RBC # BLD AUTO: 4.12 MILLION/UL (ref 3.88–5.62)
SODIUM SERPL-SCNC: 138 MMOL/L (ref 135–147)
WBC # BLD AUTO: 10.11 THOUSAND/UL (ref 4.31–10.16)

## 2024-05-06 PROCEDURE — 85025 COMPLETE CBC W/AUTO DIFF WBC: CPT | Performed by: PHYSICIAN ASSISTANT

## 2024-05-06 PROCEDURE — 99239 HOSP IP/OBS DSCHRG MGMT >30: CPT | Performed by: PHYSICIAN ASSISTANT

## 2024-05-06 PROCEDURE — 80053 COMPREHEN METABOLIC PANEL: CPT | Performed by: PHYSICIAN ASSISTANT

## 2024-05-06 PROCEDURE — 82948 REAGENT STRIP/BLOOD GLUCOSE: CPT

## 2024-05-06 RX ORDER — METRONIDAZOLE 500 MG/1
500 TABLET ORAL EVERY 8 HOURS SCHEDULED
Qty: 36 TABLET | Refills: 0 | Status: SHIPPED | OUTPATIENT
Start: 2024-05-06 | End: 2024-05-18

## 2024-05-06 RX ORDER — OXYCODONE HYDROCHLORIDE 5 MG/1
5 TABLET ORAL EVERY 6 HOURS PRN
Qty: 10 TABLET | Refills: 0 | Status: SHIPPED | OUTPATIENT
Start: 2024-05-06 | End: 2024-05-11

## 2024-05-06 RX ORDER — CIPROFLOXACIN 500 MG/1
500 TABLET, FILM COATED ORAL EVERY 12 HOURS SCHEDULED
Qty: 24 TABLET | Refills: 0 | Status: SHIPPED | OUTPATIENT
Start: 2024-05-06 | End: 2024-05-18

## 2024-05-06 RX ADMIN — SPIRONOLACTONE 25 MG: 25 TABLET ORAL at 07:38

## 2024-05-06 RX ADMIN — PIPERACILLIN SODIUM AND TAZOBACTAM SODIUM 4.5 G: 36; 4.5 INJECTION, POWDER, LYOPHILIZED, FOR SOLUTION INTRAVENOUS at 01:14

## 2024-05-06 RX ADMIN — PIPERACILLIN SODIUM AND TAZOBACTAM SODIUM 4.5 G: 36; 4.5 INJECTION, POWDER, LYOPHILIZED, FOR SOLUTION INTRAVENOUS at 07:40

## 2024-05-06 RX ADMIN — OMEGA-3 FATTY ACIDS CAP 1000 MG 1000 MG: 1000 CAP at 07:38

## 2024-05-06 RX ADMIN — AMLODIPINE BESYLATE 10 MG: 10 TABLET ORAL at 07:38

## 2024-05-06 RX ADMIN — SOTALOL HYDROCHLORIDE 120 MG: 120 TABLET ORAL at 07:38

## 2024-05-06 NOTE — NURSING NOTE
AVS reviewed with pt and understanding of instructions expressed. PT dc'd walking to hospital exit with wife.

## 2024-05-06 NOTE — RESTORATIVE TECHNICIAN NOTE
Restorative Technician Note      Patient Name: Amrit Becerra     [facility] Tech Visit Date: 05/06/24  Note Type: Mobility  Patient Position Upon Consult: Supine  Activity Performed: Ambulated; Range of motion  Range of Motion: Active  Patient Position at End of Consult: Supine; All needs within reach

## 2024-05-06 NOTE — DISCHARGE SUMMARY
[facility]  Discharge- Amrit Becerra 1950, 73 y.o. male MRN: [id number]  Unit/Bed#: [address] 210-01 Encounter: [id number]  Primary Care Provider: Sarah Dukes MD   Date and time admitted to hospital: 5/4/2024 10:37 AM    * Acute diverticulitis  Assessment & Plan  Failed outpatient treatment after 10 days Augmentin presented with worsening LLQ pain   CT A/P: acute diverticulitis of the sigmoid colon. No evidence for macroperforation. No associated pericolonic abscess   Placed on IV zosyn   Has not needed any pain medication since coming to the floor   Blood cultures negative   Spoke with GI this morning   He is tolerating diet and pain controlled   Plan to complete 14 day course abx since this is his second occurrence with ciprofloxacin and metronidazole  He has follow-up with colorectal surgery May 14 to discuss surgical options  Return for any worsening pain, inability tolerate oral intake    Type 2 diabetes mellitus, without long-term current use of insulin (formerly [facility])  Assessment & Plan  Lab Results   Component Value Date    HGBA1C 6.5 (H) 02/23/2024       Recent Labs     05/04/24  1813 05/04/24  2058 05/05/24  0746 05/05/24  1127   POCGLU 121 145* 141* 199*         Blood Sugar Average: Last 72 hrs:  (P) 151.5  Continue metformin    Persistent atrial fibrillation (formerly [facility])  Assessment & Plan  S/p ablation with [employer] pacemaker   Follow[facility] with [facility]   Continue sotalol 120 mg bid  and Xarelto 20 mg daily     TAMARA (obstructive sleep apnea)  Assessment & Plan  CPAP nightly - bringing in from home, declined using our cpap   Follow[facility] with [facility]m at [facility]     Stage 3a chronic kidney disease (CKD) (formerly [facility])  Assessment & Plan  Lab Results   Component Value Date    EGFR 44 05/06/2024    EGFR 47 05/05/2024    EGFR 54 05/04/2024    CREATININE 1.53 (H) 05/06/2024    CREATININE 1.45 (H) 05/05/2024    CREATININE 1.30 05/04/2024   [facility]   Renal function  within baseline 1.3-1.5       Hepatic steatosis  Assessment & Plan  Noted on CT with hepatomegaly/hepatic steatosis   Weight loss and lifestyle modifications, seen by gastroenterology while here, outpatient follow-up    Cyst of left kidney  Assessment & Plan  Redemonstrated probable complex cyst arising from the posterior interpolar region of the left kidney. Again, nonemergent dedicated renal ultrasound is recommended for further evaluation.   Renal ultrasound ordered here Upper pole simple cyst measuring 2.0 x 2.7 x 1.8 cm. Lower pole simple cyst measuring 2.1 x 1.7 x 2.1 cm     Benign localized prostatic hyperplasia with lower urinary tract symptoms (LUTS)  Assessment & Plan  Follows with Dr. Burt outpatient       Medical Problems       Resolved Problems  Date Reviewed: 5/5/2024   None       Discharging Physician / Practitioner: Lesly Krause PA-C  PCP: Sarah Dukes MD  Admission Date:   Admission Orders (From admission, onward)       Ordered        05/04/24 1332  INPATIENT ADMISSION  Once                          Discharge Date: 05/06/24    Consultations During Hospital Stay:  Gastroenterology    Procedures Performed:   none    Significant Findings / Test Results:   CT Abdomen pelvis  FINDINGS:     ABDOMEN     LOWER CHEST: No clinically significant abnormality in the visualized lower chest.     LIVER/BILIARY TREE: The liver is enlarged measuring up to 20 cm in maximal craniocaudal dimension and demonstrates diffuse fatty infiltration.     GALLBLADDER: Cholelithiasis. No pericholecystic inflammatory change.     SPLEEN: Unremarkable.     PANCREAS: Unremarkable.     ADRENAL GLANDS: Unremarkable.     KIDNEYS/URETERS: Redemonstrated 2.8 cm probable complex cyst arising from the posterior interpolar region of the left kidney. Subcentimeter hypoattenuating renal lesion(s), too small to characterize but statistically likely benign, which do not warrant   follow-up (Radiology June 2019). No hydronephrosis.      STOMACH AND BOWEL: No evidence for bowel obstruction. Small hiatal hernia. Duodenal diverticulum. Colonic diverticulosis is seen. There is bowel wall thickening and pericolonic inflammatory change involving the sigmoid colon consistent with acute   diverticulitis. No extraluminal free air to suggest macro perforation. No associated pericolonic abscess.     APPENDIX: No findings to suggest appendicitis.     ABDOMINOPELVIC CAVITY: No ascites. No pneumoperitoneum. No lymphadenopathy.     VESSELS: Atherosclerotic disease. No abdominal aortic aneurysm.     PELVIS     REPRODUCTIVE ORGANS: Unremarkable for patient's age.     URINARY BLADDER: Unremarkable.     ABDOMINAL WALL/INGUINAL REGIONS: Ventral abdominal wall hernia repair.     BONES: No acute fracture or suspicious osseous lesion. Stable chronic compression deformity of the T12 vertebral body.     IMPRESSION:     Findings consistent with acute diverticulitis of the sigmoid colon. No evidence for macroperforation. No associated pericolonic abscess.     Redemonstrated probable complex cyst arising from the posterior interpolar region of the left kidney. Again, nonemergent dedicated renal ultrasound is recommended for further evaluation.     Called physis. No pericholecystic inflammation.     Hepatomegaly/hepatic steatosis.  Renal ultrasound   FINDINGS:     KIDNEYS:  Symmetric and normal size.  Right kidney: 11.5 x 5.2 x 4.7 cm. Volume 148.0 mL  Left kidney: 11.7 x 5.5 x 3.6 cm. Volume 122.5 mL     Right kidney  Normal echogenicity and contour.  Multiple simple cysts measuring up to 1.4 cm  No hydronephrosis.  No shadowing calculi.  No perinephric fluid collections.     Left kidney  Normal echogenicity and contour.  Upper pole simple cyst measuring 2.0 x 2.7 x 1.8 cm. Lower pole simple cyst measuring 2.1 x 1.7 x 2.1 cm.  No hydronephrosis.  No shadowing calculi.  No perinephric fluid collections.     URETERS:  Nonvisualized.     BLADDER:  Normally distended.  No focal  thickening or mass lesions.  Bilateral ureteral jets not detected.        OTHER: Incidental note of mild hepatic steatosis.     IMPRESSION:     No obstructive uropathy. Bilateral renal cysts.     Mild hepatic steatosis.  Incidental Findings:   Patient with upper pole simple cyst of left kidney-findings were discussed with patient and wife at bedside    Test Results Pending at Discharge (will require follow up):   CBC and CMP in 2 weeks     Outpatient Tests Requested:  General surgery on May 14  Follow-up with GI and PCP outpatient    Complications:      Reason for Admission: Acute diverticulitis    Hospital Course:   Amrit Becerra is a 73 y.o. male patient who originally presented to the hospital on 5/4/2024 due to ongoing abdominal pain.  Patient completed 10-day course of antibiotics for diverticulitis with Augmentin with ongoing abdominal pain so presented to the ER.  CT was consistent with acute diverticulitis of the sigmoid colon with no evidence for macro perforation or pericolonic abscess.  Given failed outpatient treatment he was admitted with IV antibiotics.  He was seen in consultation with gastroenterology.  He improved clinically he was having normal bowel movements no significant pain not requiring any pain medication was tolerating diet.  Plan is to complete 14-day course of antibiotics outpatient event of recurrence and follow-up with colorectal surgeon on May 14 to discuss surgical options.    Patient was also noted to have left kidney cyst which was discussed with patient and wife at bedside.  He did have completed renal ultrasound while here as noted above.        Please see above list of diagnoses and related plan for additional information.     Condition at Discharge: stable    Discharge Day Visit / Exam:   Subjective: Doing well.  No significant pain.  Has not required any pain medication.  Having normal bowel movements.  No nausea or vomiting.  He is tolerating oral intake.  No fevers chills.   "Feels ready to go home today.  Vitals: Blood Pressure: 115/61 (05/06/24 0720)  Pulse: 62 (05/06/24 0720)  Temperature: 98.1 °F (36.7 °C) (05/06/24 0720)  Temp Source: Oral (05/05/24 2024)  Respirations: 16 (05/06/24 0720)  Height: 5' 6\" (167.6 cm) (05/04/24 1647)  Weight - Scale: 81 kg (178 lb 9.6 oz) (05/04/24 1647)  SpO2: 93 % (05/06/24 0720)  Exam:   Physical Exam  Vitals and nursing note reviewed.   Constitutional:       General: He is not in acute distress.     Appearance: He is not toxic-appearing.   Cardiovascular:      Rate and Rhythm: Normal rate and regular rhythm.   Pulmonary:      Effort: Pulmonary effort is normal.      Breath sounds: Normal breath sounds.   Abdominal:      General: Bowel sounds are normal.      Palpations: Abdomen is soft.      Tenderness: There is no abdominal tenderness. There is no guarding or rebound.   Musculoskeletal:      Right lower leg: No edema.      Left lower leg: No edema.   Skin:     General: Skin is warm.   Neurological:      Mental Status: He is alert. Mental status is at baseline.   Psychiatric:         Mood and Affect: Mood normal.          Discussion with Family: Updated  (wife) at bedside.    Discharge instructions/Information to patient and family:   See after visit summary for information provided to patient and family.      Provisions for Follow-Up Care:  See after visit summary for information related to follow-up care and any pertinent home health orders.      Mobility at time of Discharge:   Basic Mobility Inpatient Raw Score: 23  JH-HLM Goal: 7: Walk 25 feet or more  JH-HLM Achieved: 1: Laying in bed  HLM Goal achieved. Continue to encourage appropriate mobility.     Disposition:   Home    Planned Readmission: none     Discharge Statement:  I spent 45 minutes discharging the patient. This time was spent on the day of discharge. I had direct contact with the patient on the day of discharge. Greater than 50% of the total time was spent examining " patient, answering all patient questions, arranging and discussing plan of care with patient as well as directly providing post-discharge instructions.  Additional time then spent on discharge activities.    Discharge Medications:  See after visit summary for reconciled discharge medications provided to patient and/or family.      **Please Note: This note may have been constructed using a voice recognition system**

## 2024-05-06 NOTE — PLAN OF CARE
Problem: INFECTION - ADULT  Goal: Absence or prevention of progression during hospitalization  Description: INTERVENTIONS:  - Assess and monitor for signs and symptoms of infection  - Monitor lab/diagnostic results  - Monitor all insertion sites, i.e. indwelling lines, tubes, and drains  - Monitor endotracheal if appropriate and nasal secretions for changes in amount and color  - [place] appropriate cooling/warming therapies per order  - Administer medications as ordered  - Instruct and encourage patient and family to use good hand hygiene technique  - Identify and instruct in appropriate isolation precautions for identified infection/condition  Outcome: Progressing  Goal: Absence of fever/infection during neutropenic period  Description: INTERVENTIONS:  - Monitor WBC    Outcome: Progressing

## 2024-05-06 NOTE — PLAN OF CARE

## 2024-05-09 LAB
BACTERIA BLD CULT: NORMAL
BACTERIA BLD CULT: NORMAL

## 2024-05-14 ENCOUNTER — OFFICE VISIT (OUTPATIENT)
Dept: SURGERY | Facility: CLINIC | Age: 74
End: 2024-05-14
Payer: MEDICARE

## 2024-05-14 VITALS
DIASTOLIC BLOOD PRESSURE: 75 MMHG | HEIGHT: 66 IN | BODY MASS INDEX: 29.57 KG/M2 | SYSTOLIC BLOOD PRESSURE: 125 MMHG | WEIGHT: 184 LBS | HEART RATE: 78 BPM

## 2024-05-14 DIAGNOSIS — K57.92 ACUTE DIVERTICULITIS: Primary | ICD-10-CM

## 2024-05-14 PROCEDURE — 99204 OFFICE O/P NEW MOD 45 MIN: CPT | Performed by: SURGERY

## 2024-05-14 NOTE — LETTER
May 14, 2024     We discussed laparoscopic assisted colon resection.  He is in agreement.  Risks and benefits delineated.  I requested a follow-up visit in 1 month to reassess and schedule surgery.  In the interim I will notify cardiology in case there is testing that they preferred to do preoperatively.    Patient: Amrit Becerra   YOB: 1950   Date of Visit: 5/14/2024       Dear Dr. Dukes:    Thank you for referring Amrit Becerra to me for evaluation. Below are my notes for this consultation.    If you have questions, please do not hesitate to call me. I look forward to following your patient along with you.         Sincerely,        Mk Naqvi MD        CC: DAVID Moreau MD  5/14/2024  9:54 AM  Sign when Signing Visit  Assessment/Plan: Patient provides a history for 4 episodes of diverticulitis over the last 1 year.  The has a history for 2 episodes of diverticular disease over the last 1 month.  This required a recent hospitalization.  He is now on outpatient Cipro and Flagyl feeling better.    He states that he has had loose or soft bowel movements over the last 1 year.  He has some urgency after eating.  There is no family history for colon cancer.  Recent colonoscopy from August 2023 reveals unremarkable changes other than diverticuli within the sigmoid and descending colon.    He does have a history of atrial fibrillation.  He underwent ablation x 2 with pacemaker insertion.  He is now maintaining paced rhythm and normal sinus rhythm.  However he continues on Xarelto.  History of non-insulin-dependent diabetes.  CPAP for TAMARA at night.  History of renal insufficiency with a baseline creatinine of 1.6.  Well-maintained A1c of 6.5.    We discussed laparoscopic assisted colon resection.  He is in agreement.  Risks and benefits delineated.  I requested a follow-up visit in 1 month to reassess and schedule surgery.  In the interim I will notify cardiology in case  there is testing that they preferred to do preoperatively.  Patient is agreeable.    There are no diagnoses linked to this encounter.    Subjective:      Patient ID: Amrit Becerra is a 73 y.o. male.    Patient presents for consult regarding diverticulitis.  Denies pain.  Hospitalized 5/4 to 5/6 for acute diverticulitis.  CT abdomen pelvis 5/4/2024   IMPRESSION:  Findings consistent with acute diverticulitis of the sigmoid colon. No evidence for macroperforation. No associated pericolonic abscess.  Redemonstrated probable complex cyst arising from the posterior interpolar region of the left kidney. Again, nonemergent dedicated renal ultrasound is recommended for further evaluation.  Called physis. No pericholecystic inflammation.   Hepatomegaly/hepatic steatosis.    Patient states he has had 4 episodes of diverticulitis in the past year.  Last colonoscopy was 8/15/2023.             The following portions of the patient's history were reviewed and updated as appropriate:     He  has a past medical history of Atrial fibrillation (HCC), Benign localized prostatic hyperplasia with lower urinary tract symptoms (LUTS), Bilateral hydrocele, Hyperlipidemia, Hypertension, Other specified depressive episodes, and Type 2 diabetes mellitus (HCC).  He  has a past surgical history that includes Cystoscopy (07/2016); Hydrocele excision / repair (Bilateral, 07/2016); Hernia repair (Bilateral, 2003); Carpal tunnel release (Bilateral, 1988); Cataract extraction, bilateral (Bilateral, 2015); Thumb arthroscopy (Right, 2016); Throat surgery (1990); Cardiac surgery (10/2018); and Atrial cardiac pacemaker insertion.  His family history includes Diabetes in his sister; Heart disease in his mother.  He  reports that he has never smoked. He has never used smokeless tobacco. He reports that he does not drink alcohol and does not use drugs.  Current Outpatient Medications   Medication Sig Dispense Refill   • amLODIPine (NORVASC) 10 mg tablet 10  "mg 2 (two) times a day     • ciprofloxacin (CIPRO) 500 mg tablet Take 1 tablet (500 mg total) by mouth every 12 (twelve) hours for 12 days 24 tablet 0   • metFORMIN (GLUCOPHAGE) 500 mg tablet Take 1,000 mg by mouth 2 (two) times a day     • metroNIDAZOLE (FLAGYL) 500 mg tablet Take 1 tablet (500 mg total) by mouth every 8 (eight) hours for 12 days 36 tablet 0   • Omega-3 Fatty Acids (OMEGA-3 FISH OIL) 1000 MG CAPS Take by mouth     • simvastatin (ZOCOR) 5 MG tablet Take 10 mg by mouth daily     • sotalol (BETAPACE) 120 mg tablet 2 (two) times a day     • spironolactone (ALDACTONE) 50 mg tablet Take 25 mg by mouth 2 (two) times a day  11   • XARELTO 20 MG tablet daily       No current facility-administered medications for this visit.     He is allergic to valsartan and oxybutynin..    Review of Systems   Constitutional: Negative.  Negative for activity change.   HENT: Negative.     Eyes: Negative.    Respiratory: Negative.     Cardiovascular: Negative.    Gastrointestinal:  Positive for abdominal pain.   Endocrine: Negative.    Genitourinary: Negative.    Musculoskeletal: Negative.    Skin: Negative.    Allergic/Immunologic: Negative.    Neurological: Negative.    Psychiatric/Behavioral:  Negative for agitation, behavioral problems and confusion. The patient is not nervous/anxious.          Objective:      /75   Pulse 78   Ht 5' 6\" (1.676 m)   Wt 83.5 kg (184 lb)   BMI 29.70 kg/m²          Physical Exam  Constitutional:       Appearance: He is well-developed.   HENT:      Head: Atraumatic.   Eyes:      Extraocular Movements: Extraocular movements intact.   Neck:      Thyroid: No thyromegaly.      Trachea: No tracheal deviation.   Cardiovascular:      Rate and Rhythm: Regular rhythm.   Pulmonary:      Effort: Pulmonary effort is normal.      Breath sounds: Normal breath sounds.   Abdominal:      Palpations: Abdomen is soft.   Skin:     General: Skin is warm and dry.   Neurological:      Mental Status: He is " alert and oriented to person, place, and time.   Psychiatric:         Behavior: Behavior normal.

## 2024-05-14 NOTE — PROGRESS NOTES
Assessment/Plan: Patient provides a history for 4 episodes of diverticulitis over the last 1 year.  The has a history for 2 episodes of diverticular disease over the last 1 month.  This required a recent hospitalization.  He is now on outpatient Cipro and Flagyl feeling better.    He states that he has had loose or soft bowel movements over the last 1 year.  He has some urgency after eating.  There is no family history for colon cancer.  Recent colonoscopy from August 2023 reveals unremarkable changes other than diverticuli within the sigmoid and descending colon.    He does have a history of atrial fibrillation.  He underwent ablation x 2 with pacemaker insertion.  He is now maintaining paced rhythm and normal sinus rhythm.  However he continues on Xarelto.  History of non-insulin-dependent diabetes.  CPAP for TAMARA at night.  History of renal insufficiency with a baseline creatinine of 1.6.  Well-maintained A1c of 6.5.    We discussed laparoscopic assisted colon resection.  He is in agreement.  Risks and benefits delineated.  I requested a follow-up visit in 1 month to reassess and schedule surgery.  In the interim I will notify cardiology in case there is testing that they preferred to do preoperatively.  Patient is agreeable.    There are no diagnoses linked to this encounter.    Subjective:      Patient ID: Amrit Beecrra is a 73 y.o. male.    Patient presents for consult regarding diverticulitis.  Denies pain.  Hospitalized 5/4 to 5/6 for acute diverticulitis.  CT abdomen pelvis 5/4/2024   IMPRESSION:  Findings consistent with acute diverticulitis of the sigmoid colon. No evidence for macroperforation. No associated pericolonic abscess.  Redemonstrated probable complex cyst arising from the posterior interpolar region of the left kidney. Again, nonemergent dedicated renal ultrasound is recommended for further evaluation.  Called physis. No pericholecystic inflammation.   Hepatomegaly/hepatic steatosis.    Patient  states he has had 4 episodes of diverticulitis in the past year.  Last colonoscopy was 8/15/2023.             The following portions of the patient's history were reviewed and updated as appropriate:     He  has a past medical history of Atrial fibrillation (HCC), Benign localized prostatic hyperplasia with lower urinary tract symptoms (LUTS), Bilateral hydrocele, Hyperlipidemia, Hypertension, Other specified depressive episodes, and Type 2 diabetes mellitus (HCC).  He  has a past surgical history that includes Cystoscopy (07/2016); Hydrocele excision / repair (Bilateral, 07/2016); Hernia repair (Bilateral, 2003); Carpal tunnel release (Bilateral, 1988); Cataract extraction, bilateral (Bilateral, 2015); Thumb arthroscopy (Right, 2016); Throat surgery (1990); Cardiac surgery (10/2018); and Atrial cardiac pacemaker insertion.  His family history includes Diabetes in his sister; Heart disease in his mother.  He  reports that he has never smoked. He has never used smokeless tobacco. He reports that he does not drink alcohol and does not use drugs.  Current Outpatient Medications   Medication Sig Dispense Refill    amLODIPine (NORVASC) 10 mg tablet 10 mg 2 (two) times a day      ciprofloxacin (CIPRO) 500 mg tablet Take 1 tablet (500 mg total) by mouth every 12 (twelve) hours for 12 days 24 tablet 0    metFORMIN (GLUCOPHAGE) 500 mg tablet Take 1,000 mg by mouth 2 (two) times a day      metroNIDAZOLE (FLAGYL) 500 mg tablet Take 1 tablet (500 mg total) by mouth every 8 (eight) hours for 12 days 36 tablet 0    Omega-3 Fatty Acids (OMEGA-3 FISH OIL) 1000 MG CAPS Take by mouth      simvastatin (ZOCOR) 5 MG tablet Take 10 mg by mouth daily      sotalol (BETAPACE) 120 mg tablet 2 (two) times a day      spironolactone (ALDACTONE) 50 mg tablet Take 25 mg by mouth 2 (two) times a day  11    XARELTO 20 MG tablet daily       No current facility-administered medications for this visit.     He is allergic to valsartan and  "oxybutynin..    Review of Systems   Constitutional: Negative.  Negative for activity change.   HENT: Negative.     Eyes: Negative.    Respiratory: Negative.     Cardiovascular: Negative.    Gastrointestinal:  Positive for abdominal pain.   Endocrine: Negative.    Genitourinary: Negative.    Musculoskeletal: Negative.    Skin: Negative.    Allergic/Immunologic: Negative.    Neurological: Negative.    Psychiatric/Behavioral:  Negative for agitation, behavioral problems and confusion. The patient is not nervous/anxious.          Objective:      /75   Pulse 78   Ht 5' 6\" (1.676 m)   Wt 83.5 kg (184 lb)   BMI 29.70 kg/m²          Physical Exam  Constitutional:       Appearance: He is well-developed.   HENT:      Head: Atraumatic.   Eyes:      Extraocular Movements: Extraocular movements intact.   Neck:      Thyroid: No thyromegaly.      Trachea: No tracheal deviation.   Cardiovascular:      Rate and Rhythm: Regular rhythm.   Pulmonary:      Effort: Pulmonary effort is normal.      Breath sounds: Normal breath sounds.   Abdominal:      Palpations: Abdomen is soft.   Skin:     General: Skin is warm and dry.   Neurological:      Mental Status: He is alert and oriented to person, place, and time.   Psychiatric:         Behavior: Behavior normal.         "

## 2024-05-21 DIAGNOSIS — N13.8 BPH WITH URINARY OBSTRUCTION: Primary | ICD-10-CM

## 2024-05-21 DIAGNOSIS — N40.1 BPH WITH URINARY OBSTRUCTION: Primary | ICD-10-CM

## 2024-06-11 ENCOUNTER — PREP FOR PROCEDURE (OUTPATIENT)
Dept: SURGERY | Facility: CLINIC | Age: 74
End: 2024-06-11

## 2024-06-11 ENCOUNTER — OFFICE VISIT (OUTPATIENT)
Dept: SURGERY | Facility: CLINIC | Age: 74
End: 2024-06-11
Payer: MEDICARE

## 2024-06-11 VITALS
WEIGHT: 184 LBS | HEART RATE: 75 BPM | DIASTOLIC BLOOD PRESSURE: 71 MMHG | SYSTOLIC BLOOD PRESSURE: 120 MMHG | BODY MASS INDEX: 29.57 KG/M2 | HEIGHT: 66 IN

## 2024-06-11 DIAGNOSIS — I48.91 ATRIAL FIBRILLATION (HCC): ICD-10-CM

## 2024-06-11 DIAGNOSIS — K57.32 SIGMOID DIVERTICULITIS: Primary | ICD-10-CM

## 2024-06-11 DIAGNOSIS — E11.9 TYPE 2 DIABETES MELLITUS, WITHOUT LONG-TERM CURRENT USE OF INSULIN (HCC): ICD-10-CM

## 2024-06-11 DIAGNOSIS — K57.92 ACUTE DIVERTICULITIS: Primary | ICD-10-CM

## 2024-06-11 PROCEDURE — 99213 OFFICE O/P EST LOW 20 MIN: CPT | Performed by: SURGERY

## 2024-06-11 RX ORDER — TAMSULOSIN HYDROCHLORIDE 0.4 MG/1
0.4 CAPSULE ORAL
Qty: 10 CAPSULE | Refills: 0 | Status: SHIPPED | OUTPATIENT
Start: 2024-06-11

## 2024-06-11 NOTE — PROGRESS NOTES
Assessment/Plan:  Patient has a history for recurrent sigmoid diverticulitis.  Has had 4 episodes of left lower quadrant abdominal pain over the last 1 year.  This required hospitalization.  His symptoms have improved after antibiotics.    He is up-to-date with colonoscopy.  This was unremarkable except for diverticuli within the sigmoid and descending colon.  No blood in his stool.    He has a history of atrial fibrillation.  He underwent ablation x 2 with pacemaker insertion.  He is now maintained in a paced rhythm with normal sinus rhythm.  He continues with Xarelto.  I have asked him to discontinue Xarelto 4 days before surgery.    He has a history for non-insulin-dependent diabetes.  This is well-controlled.  Hemoglobin A1c is within normal limits.  He does use CPAP at night for TAMARA.  History of renal insufficiency with a baseline creatinine of 1.6.    We discussed laparoscopic assisted right colon resection.  He is in agreement.  Risks and benefits described.  All questions answered.    There are no diagnoses linked to this encounter.    Subjective:      Patient ID: Amrit Becerra is a 73 y.o. male.    Patient presents for follow up on diverticulitis to discuss colon resection.        The following portions of the patient's history were reviewed and updated as appropriate:     He  has a past medical history of Atrial fibrillation (HCC), Benign localized prostatic hyperplasia with lower urinary tract symptoms (LUTS), Bilateral hydrocele, Diabetes mellitus (HCC), Diverticulitis of colon, Hyperlipidemia, Hypertension, Other specified depressive episodes, and Type 2 diabetes mellitus (HCC).  He  has a past surgical history that includes Cystoscopy (07/2016); Hydrocele excision / repair (Bilateral, 07/2016); Hernia repair (Bilateral, 2003); Carpal tunnel release (Bilateral, 1988); Cataract extraction, bilateral (Bilateral, 2015); Thumb arthroscopy (Right, 2016); Throat surgery (1990); Cardiac surgery (10/2018);  "Atrial cardiac pacemaker insertion; and Colonoscopy.  His family history includes Diabetes in his sister; Heart disease in his mother.  He  reports that he has never smoked. He has never used smokeless tobacco. He reports that he does not drink alcohol and does not use drugs.  Current Outpatient Medications   Medication Sig Dispense Refill    amLODIPine (NORVASC) 10 mg tablet 10 mg 2 (two) times a day      metFORMIN (GLUCOPHAGE) 500 mg tablet Take 1,000 mg by mouth 2 (two) times a day      Omega-3 Fatty Acids (OMEGA-3 FISH OIL) 1000 MG CAPS Take by mouth      simvastatin (ZOCOR) 5 MG tablet Take 10 mg by mouth daily      sotalol (BETAPACE) 120 mg tablet 2 (two) times a day      spironolactone (ALDACTONE) 50 mg tablet Take 25 mg by mouth 2 (two) times a day  11    XARELTO 20 MG tablet daily       No current facility-administered medications for this visit.     He is allergic to valsartan and oxybutynin..    Review of Systems   Constitutional: Negative.  Negative for activity change.   HENT: Negative.     Eyes: Negative.    Respiratory: Negative.     Cardiovascular: Negative.    Gastrointestinal:  Positive for abdominal pain.   Endocrine: Negative.    Genitourinary: Negative.    Musculoskeletal: Negative.    Skin: Negative.    Allergic/Immunologic: Negative.    Neurological: Negative.    Psychiatric/Behavioral:  Negative for agitation, behavioral problems and confusion. The patient is not nervous/anxious.          Objective:      /71   Pulse 75   Ht 5' 6\" (1.676 m)   Wt 83.5 kg (184 lb)   BMI 29.70 kg/m²          Physical Exam  Constitutional:       Appearance: He is well-developed.   HENT:      Head: Atraumatic.   Eyes:      Extraocular Movements: Extraocular movements intact.   Neck:      Thyroid: No thyromegaly.      Trachea: No tracheal deviation.   Cardiovascular:      Rate and Rhythm: Regular rhythm.      Heart sounds: Normal heart sounds.   Pulmonary:      Effort: Pulmonary effort is normal.      " Breath sounds: Normal breath sounds.   Musculoskeletal:      Right lower leg: No edema.      Left lower leg: No edema.   Skin:     General: Skin is warm and dry.   Neurological:      Mental Status: He is alert and oriented to person, place, and time.   Psychiatric:         Behavior: Behavior normal.

## 2024-07-11 ENCOUNTER — TELEPHONE (OUTPATIENT)
Dept: ANESTHESIOLOGY | Facility: CLINIC | Age: 74
End: 2024-07-11

## 2024-07-11 DIAGNOSIS — Z01.89 ENCOUNTER FOR GERIATRIC ASSESSMENT: Primary | ICD-10-CM

## 2024-07-16 ENCOUNTER — ANESTHESIA EVENT (OUTPATIENT)
Dept: PERIOP | Facility: HOSPITAL | Age: 74
DRG: 331 | End: 2024-07-16
Payer: MEDICARE

## 2024-07-29 ENCOUNTER — APPOINTMENT (OUTPATIENT)
Dept: LAB | Facility: HOSPITAL | Age: 74
End: 2024-07-29
Payer: MEDICARE

## 2024-07-29 DIAGNOSIS — N40.1 BPH WITH URINARY OBSTRUCTION: ICD-10-CM

## 2024-07-29 DIAGNOSIS — N13.8 BPH WITH URINARY OBSTRUCTION: ICD-10-CM

## 2024-07-29 DIAGNOSIS — Z01.818 PRE-OP EVALUATION: ICD-10-CM

## 2024-07-29 DIAGNOSIS — E11.9 TYPE 2 DIABETES MELLITUS, WITHOUT LONG-TERM CURRENT USE OF INSULIN (HCC): ICD-10-CM

## 2024-07-29 DIAGNOSIS — I48.91 ATRIAL FIBRILLATION (HCC): ICD-10-CM

## 2024-07-29 DIAGNOSIS — K57.32 SIGMOID DIVERTICULITIS: ICD-10-CM

## 2024-07-29 LAB
ALBUMIN SERPL BCG-MCNC: 4.3 G/DL (ref 3.5–5)
ALP SERPL-CCNC: 60 U/L (ref 34–104)
ALT SERPL W P-5'-P-CCNC: 20 U/L (ref 7–52)
ANION GAP SERPL CALCULATED.3IONS-SCNC: 6 MMOL/L (ref 4–13)
APTT PPP: 38 SECONDS (ref 23–37)
AST SERPL W P-5'-P-CCNC: 14 U/L (ref 13–39)
ATRIAL RATE: 65 BPM
BILIRUB SERPL-MCNC: 1.49 MG/DL (ref 0.2–1)
BUN SERPL-MCNC: 22 MG/DL (ref 5–25)
CALCIUM SERPL-MCNC: 9.9 MG/DL (ref 8.4–10.2)
CHLORIDE SERPL-SCNC: 102 MMOL/L (ref 96–108)
CO2 SERPL-SCNC: 28 MMOL/L (ref 21–32)
CREAT SERPL-MCNC: 1.44 MG/DL (ref 0.6–1.3)
ERYTHROCYTE [DISTWIDTH] IN BLOOD BY AUTOMATED COUNT: 13.6 % (ref 11.6–15.1)
EST. AVERAGE GLUCOSE BLD GHB EST-MCNC: 137 MG/DL
GFR SERPL CREATININE-BSD FRML MDRD: 47 ML/MIN/1.73SQ M
GLUCOSE SERPL-MCNC: 120 MG/DL (ref 65–140)
HBA1C MFR BLD: 6.4 %
HCT VFR BLD AUTO: 40.7 % (ref 36.5–49.3)
HGB BLD-MCNC: 13.2 G/DL (ref 12–17)
INR PPP: 1.6 (ref 0.84–1.19)
MCH RBC QN AUTO: 28 PG (ref 26.8–34.3)
MCHC RBC AUTO-ENTMCNC: 32.4 G/DL (ref 31.4–37.4)
MCV RBC AUTO: 86 FL (ref 82–98)
P AXIS: 79 DEGREES
PLATELET # BLD AUTO: 300 THOUSANDS/UL (ref 149–390)
PMV BLD AUTO: 9.1 FL (ref 8.9–12.7)
POTASSIUM SERPL-SCNC: 4.2 MMOL/L (ref 3.5–5.3)
PR INTERVAL: 170 MS
PROT SERPL-MCNC: 8.2 G/DL (ref 6.4–8.4)
PROTHROMBIN TIME: 19.3 SECONDS (ref 11.6–14.5)
PSA SERPL-MCNC: 0.62 NG/ML (ref 0–4)
QRS AXIS: -19 DEGREES
QRSD INTERVAL: 102 MS
QT INTERVAL: 438 MS
QTC INTERVAL: 455 MS
RBC # BLD AUTO: 4.71 MILLION/UL (ref 3.88–5.62)
SODIUM SERPL-SCNC: 136 MMOL/L (ref 135–147)
T WAVE AXIS: 25 DEGREES
VENTRICULAR RATE: 65 BPM
WBC # BLD AUTO: 11.34 THOUSAND/UL (ref 4.31–10.16)

## 2024-07-29 PROCEDURE — 86900 BLOOD TYPING SEROLOGIC ABO: CPT | Performed by: SURGERY

## 2024-07-29 PROCEDURE — 93010 ELECTROCARDIOGRAM REPORT: CPT | Performed by: INTERNAL MEDICINE

## 2024-07-29 PROCEDURE — 85610 PROTHROMBIN TIME: CPT

## 2024-07-29 PROCEDURE — 86850 RBC ANTIBODY SCREEN: CPT | Performed by: SURGERY

## 2024-07-29 PROCEDURE — 86901 BLOOD TYPING SEROLOGIC RH(D): CPT | Performed by: SURGERY

## 2024-07-29 PROCEDURE — 80053 COMPREHEN METABOLIC PANEL: CPT

## 2024-07-29 PROCEDURE — 84153 ASSAY OF PSA TOTAL: CPT

## 2024-07-29 PROCEDURE — 85730 THROMBOPLASTIN TIME PARTIAL: CPT

## 2024-07-29 PROCEDURE — 85027 COMPLETE CBC AUTOMATED: CPT

## 2024-07-29 PROCEDURE — 36415 COLL VENOUS BLD VENIPUNCTURE: CPT

## 2024-07-29 PROCEDURE — 83036 HEMOGLOBIN GLYCOSYLATED A1C: CPT

## 2024-07-29 NOTE — PRE-PROCEDURE INSTRUCTIONS
"Pre-Surgery Instructions:   Medication Instructions    amLODIPine (NORVASC) 10 mg tablet Take day of surgery.    Coenzyme Q10 (COQ-10 PO) Stop taking 7 days prior to surgery.    metFORMIN (GLUCOPHAGE) 500 mg tablet Hold day of surgery.    Multiple Vitamin (MULTIVITAMIN ADULT PO) Stop taking 7 days prior to surgery.    Omega-3 Fatty Acids (OMEGA-3 FISH OIL) 1000 MG CAPS Stop taking 7 days prior to surgery.    simvastatin (ZOCOR) 5 MG tablet Take day of surgery.    sotalol (BETAPACE) 120 mg tablet Take day of surgery.    spironolactone (ALDACTONE) 50 mg tablet Hold day of surgery.    tamsulosin (FLOMAX) 0.4 mg Instructions provided by MD- to start 5 days pre-op    XARELTO 20 MG tablet Instructions provided by MD- [facility] cardiology advised to hold 3 days pre-op     See Geriatric Assessment below...  Cognitive Assessment:   CAM:   TUG <15 sec:  Falls (last 6 months): 0  Hand  score:  -Attempt 1:  -Attempt 2:  -Attempt 3:  Seferino Total Score: 22  PHQ- 9 Depression Scale:0  Nutrition Assessment Score: 14  METS: 7.34  Incentive Spirometry Level:   Health goals:  -What are your overall health goals? (quit smoking, wt. loss, rest, decrease stress)  \"To continue to be able to do the things that I want to do\"    -What brings you strength? (family, friends, Sikhism, health)  \"My family\"    -What activities are important to you? (exercise, reading, travel, work)  \"Golfing\"       Medication instructions for day surgery reviewed. Please use only a sip of water to take your instructed medications. Avoid all over the counter vitamins, supplements and NSAIDS for one week prior to surgery per anesthesia guidelines. Tylenol is ok to take as needed.     You will receive a call one business day prior to surgery with an arrival time and hospital directions. If your surgery is scheduled on a Monday, the hospital will be calling you on the Friday prior to your surgery. If you have not heard from anyone by 8pm, please call the hospital " supervisor through the hospital  at [phone]. ([place] [phone] or [place] [phone]).    Do not eat or drink anything after midnight the night before your surgery, including candy, mints, lifesavers, or chewing gum. Do not drink alcohol 24hrs before your surgery. Try not to smoke at least 24hrs before your surgery.       Follow the pre surgery showering instructions as listed in the “My Surgical Experience Booklet” or otherwise provided by your surgeon's office. Do not use a blade to shave the surgical area 1 week before surgery. It is okay to use a clean electric clippers up to 24 hours before surgery. Do not apply any lotions, creams, including makeup, cologne, deodorant, or perfumes after showering on the day of your surgery. Do not use dry shampoo, hair spray, hair gel, or any type of hair products.     No contact lenses, eye make-up, or artificial eyelashes. Remove nail polish, including gel polish, and any artificial, gel, or acrylic nails if possible. Remove all jewelry including rings and body piercing jewelry.     Wear causal clothing that is easy to take on and off. Consider your type of surgery.    Keep any valuables, jewelry, piercings at home. Please bring any specially ordered equipment (sling, braces) if indicated.    Arrange for a responsible person to drive you to and from the hospital on the day of your surgery. Please confirm the visitor policy for the day of your procedure when you receive your phone call with an arrival time.     Call the surgeon's office with any new illnesses, exposures, or additional questions prior to surgery.    Please reference your “My Surgical Experience Booklet” for additional information to prepare for your upcoming surgery.     Pt has instructions for bowel prep.

## 2024-07-30 ENCOUNTER — LAB REQUISITION (OUTPATIENT)
Dept: LAB | Facility: HOSPITAL | Age: 74
End: 2024-07-30
Payer: MEDICARE

## 2024-07-30 DIAGNOSIS — Z01.818 ENCOUNTER FOR OTHER PREPROCEDURAL EXAMINATION: ICD-10-CM

## 2024-07-30 LAB
ABO GROUP BLD: NORMAL
BLD GP AB SCN SERPL QL: NEGATIVE
RH BLD: POSITIVE
SPECIMEN EXPIRATION DATE: NORMAL

## 2024-08-02 ENCOUNTER — OFFICE VISIT (OUTPATIENT)
Dept: UROLOGY | Facility: MEDICAL CENTER | Age: 74
End: 2024-08-02
Payer: MEDICARE

## 2024-08-02 VITALS
WEIGHT: 176 LBS | BODY MASS INDEX: 28.28 KG/M2 | HEART RATE: 78 BPM | HEIGHT: 66 IN | OXYGEN SATURATION: 95 % | DIASTOLIC BLOOD PRESSURE: 64 MMHG | SYSTOLIC BLOOD PRESSURE: 114 MMHG

## 2024-08-02 DIAGNOSIS — N13.8 BPH WITH URINARY OBSTRUCTION: Primary | ICD-10-CM

## 2024-08-02 DIAGNOSIS — N40.1 BPH WITH URINARY OBSTRUCTION: Primary | ICD-10-CM

## 2024-08-02 LAB
SL AMB  POCT GLUCOSE, UA: NORMAL
SL AMB LEUKOCYTE ESTERASE,UA: NORMAL
SL AMB POCT BILIRUBIN,UA: NORMAL
SL AMB POCT BLOOD,UA: NORMAL
SL AMB POCT CLARITY,UA: CLEAR
SL AMB POCT COLOR,UA: YELLOW
SL AMB POCT KETONES,UA: NORMAL
SL AMB POCT NITRITE,UA: NORMAL
SL AMB POCT PH,UA: 5.5
SL AMB POCT SPECIFIC GRAVITY,UA: 1.01
SL AMB POCT URINE PROTEIN: 30
SL AMB POCT UROBILINOGEN: 0.2

## 2024-08-02 PROCEDURE — 99213 OFFICE O/P EST LOW 20 MIN: CPT | Performed by: UROLOGY

## 2024-08-02 PROCEDURE — 81003 URINALYSIS AUTO W/O SCOPE: CPT | Performed by: UROLOGY

## 2024-08-02 RX ORDER — SIMVASTATIN 10 MG
10 TABLET ORAL EVERY EVENING
COMMUNITY
Start: 2024-07-27

## 2024-08-02 NOTE — PROGRESS NOTES
"   HISTORY:    Follow-up BPH, minimal problems with flow, nocturia x 1-2.  Occasional urgency but not really bothered.  Had been on tamsulosin in the past, but did not feel it helped and he has not been taking it.    Will be undergoing partial colectomy soon for diverticulitis.  Was hospitalized for a flareup of that recently, and had no voiding problems in the hospital.    PSA quite low at 0.6         ASSESSMENT / PLAN:    Minimal BPH symptoms.    Very low PSA, no significant risk of prostate cancer    At this point, we will make our visits as needed.  Patient knows to call if any issues with voiding or other concerns.    If PCP feels there is a need we will see him back right away.  Otherwise as needed    The following portions of the patient's history were reviewed and updated as appropriate: allergies, current medications, past family history, past medical history, past social history, past surgical history, and problem list.    Review of Systems      Objective:     Physical Exam  Genitourinary:     Comments: Penis testes normal    Prostate minimally large no nodules          0   Lab Value Date/Time    PSA 0.620 07/29/2024 1147   ]  BUN   Date Value Ref Range Status   07/29/2024 22 5 - 25 mg/dL Final   06/28/2024 33 (H) 7 - 28 mg/dL Final     Creatinine   Date Value Ref Range Status   07/29/2024 1.44 (H) 0.60 - 1.30 mg/dL Final     Comment:     Standardized to IDMS reference method   06/28/2024 1.46 (H) 0.53 - 1.30 mg/dL Final     No components found for: \"CBC\"      Patient Active Problem List   Diagnosis    Benign localized prostatic hyperplasia with lower urinary tract symptoms (LUTS)    Overactive bladder    Acute diverticulitis    Cyst of left kidney    Hepatic steatosis    Stage 3a chronic kidney disease (CKD) (HCC)    TAMARA (obstructive sleep apnea)    Persistent atrial fibrillation (HCC)    Type 2 diabetes mellitus, without long-term current use of insulin (HCC)        Diagnoses and all orders for this " visit:    BPH with urinary obstruction  -     POCT urine dip auto non-scope    Other orders  -     simvastatin (ZOCOR) 10 mg tablet; Take 10 mg by mouth every evening           Patient ID: Amrit Becerra is a 73 y.o. male.      Current Outpatient Medications:     simvastatin (ZOCOR) 10 mg tablet, Take 10 mg by mouth every evening, Disp: , Rfl:     amLODIPine (NORVASC) 10 mg tablet, 10 mg 2 (two) times a day, Disp: , Rfl:     Coenzyme Q10 (COQ-10 PO), , Disp: , Rfl:     metFORMIN (GLUCOPHAGE) 500 mg tablet, Take 1,000 mg by mouth 2 (two) times a day, Disp: , Rfl:     Multiple Vitamin (MULTIVITAMIN ADULT PO), , Disp: , Rfl:     Omega-3 Fatty Acids (OMEGA-3 FISH OIL) 1000 MG CAPS, Take by mouth, Disp: , Rfl:     sotalol (BETAPACE) 120 mg tablet, 2 (two) times a day, Disp: , Rfl:     spironolactone (ALDACTONE) 50 mg tablet, Take 25 mg by mouth 2 (two) times a day, Disp: , Rfl: 11    tamsulosin (FLOMAX) 0.4 mg, Take 1 capsule (0.4 mg total) by mouth daily with dinner Begin 5 days preoperatively., Disp: 10 capsule, Rfl: 0    XARELTO 20 MG tablet, daily, Disp: , Rfl:     Past Medical History:   Diagnosis Date    Atrial fibrillation (HCC)     Benign localized prostatic hyperplasia with lower urinary tract symptoms (LUTS)     Bilateral hydrocele     CPAP (continuous positive airway pressure) dependence     Diabetes mellitus (HCC)     Diverticulitis of colon     Hyperlipidemia     Hypertension     Other specified depressive episodes     Sleep apnea     Type 2 diabetes mellitus (HCC)        Past Surgical History:   Procedure Laterality Date    ATRIAL CARDIAC PACEMAKER INSERTION      CARDIAC SURGERY  10/2018    Ablation    CARPAL TUNNEL RELEASE Bilateral 1988    CATARACT EXTRACTION, BILATERAL Bilateral 2015    COLONOSCOPY      CYSTOSCOPY  07/2016    HERNIA REPAIR Bilateral 2003    HYDROCELE EXCISION / REPAIR Bilateral 07/2016    THROAT SURGERY  1990    THUMB ARTHROSCOPY Right 2016       Social History

## 2024-08-05 ENCOUNTER — OFFICE VISIT (OUTPATIENT)
Dept: SURGERY | Facility: CLINIC | Age: 74
End: 2024-08-05
Payer: MEDICARE

## 2024-08-05 VITALS
SYSTOLIC BLOOD PRESSURE: 132 MMHG | BODY MASS INDEX: 29.57 KG/M2 | HEART RATE: 80 BPM | DIASTOLIC BLOOD PRESSURE: 82 MMHG | HEIGHT: 66 IN | WEIGHT: 184 LBS

## 2024-08-05 DIAGNOSIS — K57.92 ACUTE DIVERTICULITIS: Primary | ICD-10-CM

## 2024-08-05 PROCEDURE — 99213 OFFICE O/P EST LOW 20 MIN: CPT | Performed by: SURGERY

## 2024-08-05 NOTE — ASSESSMENT & PLAN NOTE
Patient has a history of recurrent sigmoid appetite diverticulitis.  He has had 4 episodes of left lower quadrant abdominal pain over the last 1 year.  This is required hospitalizations.  Each time his symptoms have improved with antibiotics.    He is up-to-date on colonoscopy this was unremarkable except for diverticuli within the sigmoid and descending colon.  No history of for blood in his stool.    Sigmoid colon resection is recommended.  Risks and benefits explained.  Patient is aware of temporary colostomy, bleeding, stress to his heart and lungs, increased risk for infection of the wound secondary to diabetes, ureteral damage.  Any and all complications can occur although they are usually at low values.  He is understanding.    He has a history of atrial fibrillation.  He underwent ablation x 2 with pacemaker insertion.  He is now maintaining a paced rhythm.  He continues on Xarelto.  Of asked him to discontinue this for 4 days before surgery.    H he has a history for non-insulin-dependent diabetes.  This is well-controlled.  Hemoglobin A1c is 6.4.  History of for renal insufficiency with a creatinine of 1.8.  He is at increased risk for LEE.  This was explained to him.  In addition he uses CPAP at night for TAMARA.    Laparoscopic assisted left colon resection is recommended.  Risks and benefits explained in detail.  He is agreeable.

## 2024-08-05 NOTE — PROGRESS NOTES
Assessment/Plan:Patient has a history of recurrent sigmoid appetite diverticulitis.  He has had 4 episodes of left lower quadrant abdominal pain over the last 1 year.  This is required hospitalizations.  Each time his symptoms have improved with antibiotics.     He is up-to-date on colonoscopy this was unremarkable except for diverticuli within the sigmoid and descending colon.  No history of for blood in his stool.     Sigmoid colon resection is recommended.  Risks and benefits explained.  Patient is aware of temporary colostomy, bleeding, stress to his heart and lungs, increased risk for infection of the wound secondary to diabetes, ureteral damage.  Any and all complications can occur although they are usually at low values.  He is understanding.     He has a history of atrial fibrillation.  He underwent ablation x 2 with pacemaker insertion.  He is now maintaining a paced rhythm.  He continues on Xarelto.  Of asked him to discontinue this for 4 days before surgery.     H he has a history for non-insulin-dependent diabetes.  This is well-controlled.  Hemoglobin A1c is 6.4.  History of for renal insufficiency with a creatinine of 1.8.  He is at increased risk for LEE.  This was explained to him.  In addition he uses CPAP at night for TAMARA.     Laparoscopic assisted left colon resection is recommended.  Risks and benefits explained in detail.  He is agreeable.    There are no diagnoses linked to this encounter.    Subjective:      Patient ID: Amrit Becerra is a 73 y.o. male.    Patient presents for pre op consult.  Scheduled from resection colon sigmoid laparoscopic hand assisted 8/16/2024.        The following portions of the patient's history were reviewed and updated as appropriate:     He  has a past medical history of Atrial fibrillation (HCC), Benign localized prostatic hyperplasia with lower urinary tract symptoms (LUTS), Bilateral hydrocele, CPAP (continuous positive airway pressure) dependence, Diabetes  mellitus (HCC), Diverticulitis of colon, Hyperlipidemia, Hypertension, Other specified depressive episodes, Sleep apnea, and Type 2 diabetes mellitus (HCC).  He  has a past surgical history that includes Cystoscopy (07/2016); Hydrocele excision / repair (Bilateral, 07/2016); Hernia repair (Bilateral, 2003); Carpal tunnel release (Bilateral, 1988); Cataract extraction, bilateral (Bilateral, 2015); Thumb arthroscopy (Right, 2016); Throat surgery (1990); Cardiac surgery (10/2018); Atrial cardiac pacemaker insertion; and Colonoscopy.  His family history includes Diabetes in his sister; Heart disease in his mother.  He  reports that he has never smoked. He has never used smokeless tobacco. He reports that he does not drink alcohol and does not use drugs.  Current Outpatient Medications   Medication Sig Dispense Refill    amLODIPine (NORVASC) 10 mg tablet 10 mg 2 (two) times a day      Coenzyme Q10 (COQ-10 PO)       metFORMIN (GLUCOPHAGE) 500 mg tablet Take 1,000 mg by mouth 2 (two) times a day      Multiple Vitamin (MULTIVITAMIN ADULT PO)       Omega-3 Fatty Acids (OMEGA-3 FISH OIL) 1000 MG CAPS Take by mouth      simvastatin (ZOCOR) 10 mg tablet Take 10 mg by mouth every evening      sotalol (BETAPACE) 120 mg tablet 2 (two) times a day      spironolactone (ALDACTONE) 50 mg tablet Take 25 mg by mouth 2 (two) times a day  11    tamsulosin (FLOMAX) 0.4 mg Take 1 capsule (0.4 mg total) by mouth daily with dinner Begin 5 days preoperatively. (Patient not taking: Reported on 8/2/2024) 10 capsule 0    XARELTO 20 MG tablet daily       No current facility-administered medications for this visit.     He is allergic to valsartan and oxybutynin..    Review of Systems   Constitutional: Negative.  Negative for activity change.   HENT: Negative.     Eyes: Negative.    Respiratory: Negative.     Cardiovascular: Negative.    Gastrointestinal:  Positive for abdominal pain.   Endocrine: Negative.    Genitourinary: Negative.   "  Musculoskeletal: Negative.    Skin: Negative.    Allergic/Immunologic: Negative.    Neurological: Negative.    Psychiatric/Behavioral:  Negative for agitation, behavioral problems and confusion. The patient is not nervous/anxious.          Objective:      /82   Pulse 80   Ht 5' 6\" (1.676 m)   Wt 83.5 kg (184 lb)   BMI 29.70 kg/m²          Physical Exam  Constitutional:       Appearance: He is well-developed.   HENT:      Head: Atraumatic.   Eyes:      Extraocular Movements: Extraocular movements intact.   Neck:      Thyroid: No thyromegaly.      Trachea: No tracheal deviation.   Cardiovascular:      Rate and Rhythm: Regular rhythm.      Heart sounds: Normal heart sounds.   Pulmonary:      Effort: Pulmonary effort is normal.      Breath sounds: Normal breath sounds.   Musculoskeletal:      Right lower leg: No edema.      Left lower leg: No edema.   Skin:     General: Skin is warm and dry.   Neurological:      Mental Status: He is alert and oriented to person, place, and time.   Psychiatric:         Behavior: Behavior normal.         "

## 2024-08-12 RX ORDER — OXYCODONE AND ACETAMINOPHEN 5; 325 MG/1; MG/1
1 TABLET ORAL EVERY 4 HOURS PRN
Qty: 10 TABLET | Refills: 0 | Status: SHIPPED | OUTPATIENT
Start: 2024-08-12

## 2024-08-12 RX ORDER — HYDROCODONE BITARTRATE AND ACETAMINOPHEN 5; 325 MG/1; MG/1
1 TABLET ORAL EVERY 6 HOURS PRN
Qty: 10 TABLET | Refills: 0 | Status: SHIPPED | OUTPATIENT
Start: 2024-08-12 | End: 2024-08-12

## 2024-08-12 NOTE — DISCHARGE INSTR - AVS FIRST PAGE
Please call the office when you leave to schedule an appointment for 2 weeks.              Please call [phone]. [address] [facility], [address], [place], [address]. [address] between [employer] and [employer].     Activity:    May lift 10 lb as many times as desired the 1st week,       20 lb in 2 weeks,       30 lb in 3 weeks.                Walking is encouraged  Normal daily activities including climbing steps are okay  Do not engage in strenuous activity ( sit-ups or crutches) or contact sports for 4-6 weeks post-operatively    Return to Work:   Okay to return to work when you feel well if you desire.        Diet:   Full liquids, and soft foods -   no salads , raw vegetables, steak, or pork chops for 1 month please    Wound Care:  Your wound is closed with staples.  It is okay to shower. Wash incision gently with soap and water and pat dry. Do not soak incisions in bath water or swim for two weeks. Do not apply any creams or ointments.    Pain Medication:   Please take as directed if needed. May use Advil or Motrin in addition.  Recall, the pain medicine and anesthesia is associated with constipation.    No driving while taking narcotic pain medications.      Other:  It is normal to developed a “healing ridge” / firm incision after surgery.  This is your body making scar tissue.  It is a good sign  Constipation is very common after general anesthesia.  Please use milk of magnesia as needed in order to help prevent constipation.  It is normal to get bruising after surgery.  If you have questions after discharge please call the office.    If you have increased pain, fever >101.5, increased drainage, redness or a bad smell at your surgery site, please call us immediately or come directly to the Emergency Room

## 2024-08-16 ENCOUNTER — ANESTHESIA (OUTPATIENT)
Dept: PERIOP | Facility: HOSPITAL | Age: 74
DRG: 331 | End: 2024-08-16
Payer: MEDICARE

## 2024-08-16 ENCOUNTER — HOSPITAL ENCOUNTER (INPATIENT)
Facility: HOSPITAL | Age: 74
LOS: 4 days | Discharge: HOME | DRG: 331 | End: 2024-08-20
Attending: SURGERY | Admitting: SURGERY
Payer: MEDICARE

## 2024-08-16 DIAGNOSIS — K57.32 SIGMOID DIVERTICULITIS: ICD-10-CM

## 2024-08-16 DIAGNOSIS — E11.9 TYPE 2 DIABETES MELLITUS, WITHOUT LONG-TERM CURRENT USE OF INSULIN (HCC): ICD-10-CM

## 2024-08-16 DIAGNOSIS — N18.31 STAGE 3A CHRONIC KIDNEY DISEASE (CKD) (HCC): Primary | ICD-10-CM

## 2024-08-16 DIAGNOSIS — K57.92 ACUTE DIVERTICULITIS: ICD-10-CM

## 2024-08-16 PROBLEM — Z90.49 S/P LAPAROSCOPIC-ASSISTED SIGMOIDECTOMY: Status: ACTIVE | Noted: 2024-08-16

## 2024-08-16 PROBLEM — N40.0 BPH (BENIGN PROSTATIC HYPERPLASIA): Status: ACTIVE | Noted: 2018-10-10

## 2024-08-16 PROBLEM — I10 HYPERTENSION: Status: ACTIVE | Noted: 2024-08-16

## 2024-08-16 LAB
ABO GROUP BLD: NORMAL
GLUCOSE SERPL-MCNC: 132 MG/DL (ref 65–140)
RH BLD: POSITIVE

## 2024-08-16 PROCEDURE — 0DJD8ZZ INSPECTION OF LOWER INTESTINAL TRACT, VIA NATURAL OR ARTIFICIAL OPENING ENDOSCOPIC: ICD-10-PCS | Performed by: SURGERY

## 2024-08-16 PROCEDURE — 82948 REAGENT STRIP/BLOOD GLUCOSE: CPT

## 2024-08-16 PROCEDURE — 88307 TISSUE EXAM BY PATHOLOGIST: CPT | Performed by: PATHOLOGY

## 2024-08-16 PROCEDURE — 0DTN4ZG RESECTION OF SIGMOID COLON, PERCUTANEOUS ENDOSCOPIC APPROACH, HAND-ASSISTED: ICD-10-PCS | Performed by: SURGERY

## 2024-08-16 PROCEDURE — 44204 LAPARO PARTIAL COLECTOMY: CPT | Performed by: SURGERY

## 2024-08-16 RX ORDER — HYDROMORPHONE HCL/PF 1 MG/ML
0.2 SYRINGE (ML) INJECTION
Status: DISCONTINUED | OUTPATIENT
Start: 2024-08-16 | End: 2024-08-16

## 2024-08-16 RX ORDER — AMLODIPINE BESYLATE 10 MG/1
10 TABLET ORAL 2 TIMES DAILY
Status: DISCONTINUED | OUTPATIENT
Start: 2024-08-16 | End: 2024-08-20 | Stop reason: HOSPADM

## 2024-08-16 RX ORDER — SOTALOL HYDROCHLORIDE 80 MG/1
120 TABLET ORAL 2 TIMES DAILY
Status: DISCONTINUED | OUTPATIENT
Start: 2024-08-16 | End: 2024-08-20 | Stop reason: HOSPADM

## 2024-08-16 RX ORDER — FENTANYL CITRATE/PF 50 MCG/ML
25 SYRINGE (ML) INJECTION
Status: COMPLETED | OUTPATIENT
Start: 2024-08-16 | End: 2024-08-16

## 2024-08-16 RX ORDER — HYDROMORPHONE HCL/PF 1 MG/ML
SYRINGE (ML) INJECTION AS NEEDED
Status: DISCONTINUED | OUTPATIENT
Start: 2024-08-16 | End: 2024-08-16

## 2024-08-16 RX ORDER — MIDAZOLAM HYDROCHLORIDE 2 MG/2ML
INJECTION, SOLUTION INTRAMUSCULAR; INTRAVENOUS AS NEEDED
Status: DISCONTINUED | OUTPATIENT
Start: 2024-08-16 | End: 2024-08-16

## 2024-08-16 RX ORDER — HEPARIN SODIUM 5000 [USP'U]/ML
5000 INJECTION, SOLUTION INTRAVENOUS; SUBCUTANEOUS EVERY 8 HOURS SCHEDULED
Status: DISCONTINUED | OUTPATIENT
Start: 2024-08-17 | End: 2024-08-18

## 2024-08-16 RX ORDER — ACETAMINOPHEN 325 MG/1
650 TABLET ORAL EVERY 6 HOURS PRN
Status: DISCONTINUED | OUTPATIENT
Start: 2024-08-16 | End: 2024-08-16

## 2024-08-16 RX ORDER — HYDROMORPHONE HCL/PF 1 MG/ML
1 SYRINGE (ML) INJECTION
Status: DISCONTINUED | OUTPATIENT
Start: 2024-08-16 | End: 2024-08-19

## 2024-08-16 RX ORDER — MAGNESIUM HYDROXIDE 1200 MG/15ML
LIQUID ORAL AS NEEDED
Status: DISCONTINUED | OUTPATIENT
Start: 2024-08-16 | End: 2024-08-16 | Stop reason: HOSPADM

## 2024-08-16 RX ORDER — MEPERIDINE HYDROCHLORIDE 25 MG/ML
12.5 INJECTION INTRAMUSCULAR; INTRAVENOUS; SUBCUTANEOUS
Status: DISCONTINUED | OUTPATIENT
Start: 2024-08-16 | End: 2024-08-16 | Stop reason: HOSPADM

## 2024-08-16 RX ORDER — PROMETHAZINE HYDROCHLORIDE 25 MG/ML
12.5 INJECTION, SOLUTION INTRAMUSCULAR; INTRAVENOUS ONCE AS NEEDED
Status: DISCONTINUED | OUTPATIENT
Start: 2024-08-16 | End: 2024-08-16 | Stop reason: HOSPADM

## 2024-08-16 RX ORDER — SODIUM CHLORIDE, SODIUM LACTATE, POTASSIUM CHLORIDE, CALCIUM CHLORIDE 600; 310; 30; 20 MG/100ML; MG/100ML; MG/100ML; MG/100ML
INJECTION, SOLUTION INTRAVENOUS CONTINUOUS PRN
Status: DISCONTINUED | OUTPATIENT
Start: 2024-08-16 | End: 2024-08-16

## 2024-08-16 RX ORDER — ACETAMINOPHEN 325 MG/1
650 TABLET ORAL EVERY 6 HOURS PRN
Status: DISCONTINUED | OUTPATIENT
Start: 2024-08-16 | End: 2024-08-19

## 2024-08-16 RX ORDER — OXYCODONE HYDROCHLORIDE 5 MG/1
5 TABLET ORAL EVERY 4 HOURS PRN
Status: DISCONTINUED | OUTPATIENT
Start: 2024-08-16 | End: 2024-08-16

## 2024-08-16 RX ORDER — OXYCODONE AND ACETAMINOPHEN 5; 325 MG/1; MG/1
1 TABLET ORAL EVERY 4 HOURS PRN
Status: DISCONTINUED | OUTPATIENT
Start: 2024-08-16 | End: 2024-08-20 | Stop reason: HOSPADM

## 2024-08-16 RX ORDER — METRONIDAZOLE 500 MG/100ML
500 INJECTION, SOLUTION INTRAVENOUS ONCE
Status: COMPLETED | OUTPATIENT
Start: 2024-08-16 | End: 2024-08-16

## 2024-08-16 RX ORDER — ALBUTEROL SULFATE 0.83 MG/ML
2.5 SOLUTION RESPIRATORY (INHALATION) ONCE AS NEEDED
Status: DISCONTINUED | OUTPATIENT
Start: 2024-08-16 | End: 2024-08-16 | Stop reason: HOSPADM

## 2024-08-16 RX ORDER — CEFAZOLIN SODIUM 2 G/50ML
2000 SOLUTION INTRAVENOUS ONCE
Status: COMPLETED | OUTPATIENT
Start: 2024-08-16 | End: 2024-08-16

## 2024-08-16 RX ORDER — ENOXAPARIN SODIUM 100 MG/ML
40 INJECTION SUBCUTANEOUS DAILY
Status: DISCONTINUED | OUTPATIENT
Start: 2024-08-17 | End: 2024-08-16

## 2024-08-16 RX ORDER — LIDOCAINE HYDROCHLORIDE 10 MG/ML
INJECTION, SOLUTION EPIDURAL; INFILTRATION; INTRACAUDAL; PERINEURAL AS NEEDED
Status: DISCONTINUED | OUTPATIENT
Start: 2024-08-16 | End: 2024-08-16

## 2024-08-16 RX ORDER — OXYCODONE AND ACETAMINOPHEN 5; 325 MG/1; MG/1
1 TABLET ORAL EVERY 4 HOURS PRN
Status: DISCONTINUED | OUTPATIENT
Start: 2024-08-16 | End: 2024-08-16 | Stop reason: SDUPTHER

## 2024-08-16 RX ORDER — FENTANYL CITRATE 50 UG/ML
INJECTION, SOLUTION INTRAMUSCULAR; INTRAVENOUS AS NEEDED
Status: DISCONTINUED | OUTPATIENT
Start: 2024-08-16 | End: 2024-08-16

## 2024-08-16 RX ORDER — PRAVASTATIN SODIUM 20 MG
20 TABLET ORAL
Status: DISCONTINUED | OUTPATIENT
Start: 2024-08-16 | End: 2024-08-20 | Stop reason: HOSPADM

## 2024-08-16 RX ORDER — ONDANSETRON 2 MG/ML
4 INJECTION INTRAMUSCULAR; INTRAVENOUS ONCE AS NEEDED
Status: DISCONTINUED | OUTPATIENT
Start: 2024-08-16 | End: 2024-08-16 | Stop reason: HOSPADM

## 2024-08-16 RX ORDER — BUPIVACAINE HYDROCHLORIDE AND EPINEPHRINE 2.5; 5 MG/ML; UG/ML
INJECTION, SOLUTION INFILTRATION; PERINEURAL AS NEEDED
Status: DISCONTINUED | OUTPATIENT
Start: 2024-08-16 | End: 2024-08-16 | Stop reason: HOSPADM

## 2024-08-16 RX ORDER — ACETAMINOPHEN 325 MG/1
650 TABLET ORAL EVERY 4 HOURS PRN
Status: DISCONTINUED | OUTPATIENT
Start: 2024-08-16 | End: 2024-08-16 | Stop reason: SDUPTHER

## 2024-08-16 RX ORDER — DEXTROSE MONOHYDRATE, SODIUM CHLORIDE, AND POTASSIUM CHLORIDE 50; 1.49; 4.5 G/1000ML; G/1000ML; G/1000ML
125 INJECTION, SOLUTION INTRAVENOUS CONTINUOUS
Status: DISCONTINUED | OUTPATIENT
Start: 2024-08-16 | End: 2024-08-16

## 2024-08-16 RX ORDER — SODIUM CHLORIDE, SODIUM LACTATE, POTASSIUM CHLORIDE, CALCIUM CHLORIDE 600; 310; 30; 20 MG/100ML; MG/100ML; MG/100ML; MG/100ML
100 INJECTION, SOLUTION INTRAVENOUS CONTINUOUS
Status: DISCONTINUED | OUTPATIENT
Start: 2024-08-16 | End: 2024-08-17

## 2024-08-16 RX ORDER — TAMSULOSIN HYDROCHLORIDE 0.4 MG/1
0.4 CAPSULE ORAL
Status: DISCONTINUED | OUTPATIENT
Start: 2024-08-16 | End: 2024-08-20 | Stop reason: HOSPADM

## 2024-08-16 RX ORDER — PROPOFOL 10 MG/ML
INJECTION, EMULSION INTRAVENOUS AS NEEDED
Status: DISCONTINUED | OUTPATIENT
Start: 2024-08-16 | End: 2024-08-16

## 2024-08-16 RX ORDER — DEXAMETHASONE SODIUM PHOSPHATE 10 MG/ML
INJECTION, SOLUTION INTRAMUSCULAR; INTRAVENOUS AS NEEDED
Status: DISCONTINUED | OUTPATIENT
Start: 2024-08-16 | End: 2024-08-16

## 2024-08-16 RX ORDER — ONDANSETRON 2 MG/ML
4 INJECTION INTRAMUSCULAR; INTRAVENOUS EVERY 6 HOURS PRN
Status: DISCONTINUED | OUTPATIENT
Start: 2024-08-16 | End: 2024-08-20 | Stop reason: HOSPADM

## 2024-08-16 RX ORDER — HYDROMORPHONE HCL/PF 1 MG/ML
0.5 SYRINGE (ML) INJECTION
Status: DISCONTINUED | OUTPATIENT
Start: 2024-08-16 | End: 2024-08-16 | Stop reason: HOSPADM

## 2024-08-16 RX ORDER — ONDANSETRON 2 MG/ML
INJECTION INTRAMUSCULAR; INTRAVENOUS AS NEEDED
Status: DISCONTINUED | OUTPATIENT
Start: 2024-08-16 | End: 2024-08-16

## 2024-08-16 RX ORDER — HYDROMORPHONE HCL/PF 1 MG/ML
1 SYRINGE (ML) INJECTION
Status: DISCONTINUED | OUTPATIENT
Start: 2024-08-16 | End: 2024-08-16

## 2024-08-16 RX ORDER — ROCURONIUM BROMIDE 10 MG/ML
INJECTION, SOLUTION INTRAVENOUS AS NEEDED
Status: DISCONTINUED | OUTPATIENT
Start: 2024-08-16 | End: 2024-08-16

## 2024-08-16 RX ADMIN — SUGAMMADEX 200 MG: 100 INJECTION, SOLUTION INTRAVENOUS at 16:00

## 2024-08-16 RX ADMIN — HYDROMORPHONE HYDROCHLORIDE 1 MG: 1 INJECTION, SOLUTION INTRAMUSCULAR; INTRAVENOUS; SUBCUTANEOUS at 19:58

## 2024-08-16 RX ADMIN — SODIUM CHLORIDE, SODIUM LACTATE, POTASSIUM CHLORIDE, AND CALCIUM CHLORIDE 100 ML/HR: .6; .31; .03; .02 INJECTION, SOLUTION INTRAVENOUS at 18:36

## 2024-08-16 RX ADMIN — SODIUM CHLORIDE, SODIUM LACTATE, POTASSIUM CHLORIDE, AND CALCIUM CHLORIDE: .6; .31; .03; .02 INJECTION, SOLUTION INTRAVENOUS at 15:24

## 2024-08-16 RX ADMIN — ONDANSETRON 4 MG: 2 INJECTION INTRAMUSCULAR; INTRAVENOUS at 15:27

## 2024-08-16 RX ADMIN — SOTALOL HYDROCHLORIDE 120 MG: 80 TABLET ORAL at 18:42

## 2024-08-16 RX ADMIN — SODIUM CHLORIDE, SODIUM LACTATE, POTASSIUM CHLORIDE, AND CALCIUM CHLORIDE: .6; .31; .03; .02 INJECTION, SOLUTION INTRAVENOUS at 12:17

## 2024-08-16 RX ADMIN — DEXAMETHASONE SODIUM PHOSPHATE 10 MG: 10 INJECTION INTRAMUSCULAR; INTRAVENOUS at 12:32

## 2024-08-16 RX ADMIN — METRONIDAZOLE: 500 INJECTION, SOLUTION INTRAVENOUS at 12:37

## 2024-08-16 RX ADMIN — LIDOCAINE HYDROCHLORIDE 30 MG: 10 INJECTION, SOLUTION EPIDURAL; INFILTRATION; INTRACAUDAL at 12:21

## 2024-08-16 RX ADMIN — PROPOFOL 100 MG: 10 INJECTION, EMULSION INTRAVENOUS at 12:21

## 2024-08-16 RX ADMIN — HYDROMORPHONE HYDROCHLORIDE 0.5 MG: 1 INJECTION, SOLUTION INTRAMUSCULAR; INTRAVENOUS; SUBCUTANEOUS at 16:10

## 2024-08-16 RX ADMIN — MIDAZOLAM 2 MG: 1 INJECTION INTRAMUSCULAR; INTRAVENOUS at 12:17

## 2024-08-16 RX ADMIN — FENTANYL CITRATE 25 MCG: 50 INJECTION INTRAMUSCULAR; INTRAVENOUS at 17:39

## 2024-08-16 RX ADMIN — ROCURONIUM BROMIDE 50 MG: 10 INJECTION INTRAVENOUS at 12:22

## 2024-08-16 RX ADMIN — AMLODIPINE BESYLATE 10 MG: 10 TABLET ORAL at 20:01

## 2024-08-16 RX ADMIN — ROCURONIUM BROMIDE 30 MG: 10 INJECTION INTRAVENOUS at 15:11

## 2024-08-16 RX ADMIN — ROCURONIUM BROMIDE 30 MG: 10 INJECTION INTRAVENOUS at 13:04

## 2024-08-16 RX ADMIN — HYDROMORPHONE HYDROCHLORIDE 0.5 MG: 1 INJECTION, SOLUTION INTRAMUSCULAR; INTRAVENOUS; SUBCUTANEOUS at 17:57

## 2024-08-16 RX ADMIN — SODIUM CHLORIDE, SODIUM LACTATE, POTASSIUM CHLORIDE, AND CALCIUM CHLORIDE 100 ML/HR: .6; .31; .03; .02 INJECTION, SOLUTION INTRAVENOUS at 23:32

## 2024-08-16 RX ADMIN — FENTANYL CITRATE 25 MCG: 50 INJECTION INTRAMUSCULAR; INTRAVENOUS at 17:44

## 2024-08-16 RX ADMIN — TAMSULOSIN HYDROCHLORIDE 0.4 MG: 0.4 CAPSULE ORAL at 18:43

## 2024-08-16 RX ADMIN — SODIUM CHLORIDE, SODIUM LACTATE, POTASSIUM CHLORIDE, CALCIUM CHLORIDE: 600; 310; 30; 20 INJECTION, SOLUTION INTRAVENOUS at 12:27

## 2024-08-16 RX ADMIN — PRAVASTATIN SODIUM 20 MG: 20 TABLET ORAL at 18:42

## 2024-08-16 RX ADMIN — FENTANYL CITRATE 25 MCG: 50 INJECTION INTRAMUSCULAR; INTRAVENOUS at 17:50

## 2024-08-16 RX ADMIN — HYDROMORPHONE HYDROCHLORIDE 1 MG: 1 INJECTION, SOLUTION INTRAMUSCULAR; INTRAVENOUS; SUBCUTANEOUS at 23:29

## 2024-08-16 RX ADMIN — CEFAZOLIN SODIUM 2000 MG: 2 SOLUTION INTRAVENOUS at 12:32

## 2024-08-16 RX ADMIN — ROCURONIUM BROMIDE 30 MG: 10 INJECTION INTRAVENOUS at 13:51

## 2024-08-16 RX ADMIN — HYDROMORPHONE HYDROCHLORIDE 0.5 MG: 1 INJECTION, SOLUTION INTRAMUSCULAR; INTRAVENOUS; SUBCUTANEOUS at 16:00

## 2024-08-16 RX ADMIN — FENTANYL CITRATE 100 MCG: 50 INJECTION INTRAMUSCULAR; INTRAVENOUS at 12:21

## 2024-08-16 NOTE — ANESTHESIA PREPROCEDURE EVALUATION
Procedure:  RESECTION COLON SIGMOID LAPAROSCOPIC HAND-ASSISTED (Abdomen)    Relevant Problems   CARDIO   (+) Persistent atrial fibrillation (HCC)      ENDO   (+) Type 2 diabetes mellitus, without long-term current use of insulin (HCC)      GI/HEPATIC   (+) Hepatic steatosis      /RENAL   (+) Benign localized prostatic hyperplasia with lower urinary tract symptoms (LUTS)   (+) Cyst of left kidney   (+) Stage 3a chronic kidney disease (CKD) (HCC)      PULMONARY   (+) TAMARA (obstructive sleep apnea)        Physical Exam    Airway    Mallampati score: I  TM Distance: >3 FB  Neck ROM: full     Dental       Cardiovascular  Cardiovascular exam normal    Pulmonary  Pulmonary exam normal     Other Findings        Anesthesia Plan  ASA Score- 3     Anesthesia Type- general with ASA Monitors.         Additional Monitors:     Airway Plan: ETT.           Plan Factors-Exercise tolerance (METS): >4 METS.    Chart reviewed. EKG reviewed. Imaging results reviewed. Existing labs reviewed. Patient summary reviewed.    Patient is not a current smoker.  Patient did not smoke on day of surgery.    Obstructive sleep apnea risk education given perioperatively.        Induction- intravenous.    Postoperative Plan- Plan for postoperative opioid use. Planned trial extubation        Informed Consent- Anesthetic plan and risks discussed with patient.  I personally reviewed this patient with the CRNA. Discussed and agreed on the Anesthesia Plan with the CRNA..

## 2024-08-16 NOTE — ANESTHESIA POSTPROCEDURE EVALUATION
Post-Op Assessment Note    CV Status:  Stable  Pain Score: 0    Pain management: adequate       Mental Status:  Alert and sleepy   Hydration Status:  Euvolemic   PONV Controlled:  Controlled   Airway Patency:  Patent     Post Op Vitals Reviewed: Yes      Staff: CRNA               BP      Temp      Pulse     Resp      SpO2

## 2024-08-16 NOTE — INTERVAL H&P NOTE
H&P reviewed. After examining the patient I find no changes in the patients condition since the H&P had been written.    Vitals:    08/16/24 1014   BP: 107/64   Pulse: 66   Resp: 18   Temp: (!) 97.4 °F (36.3 °C)   SpO2: 96%

## 2024-08-16 NOTE — PROGRESS NOTES
"General Surgery  Progress Note   Amrit Becerra 73 y.o. male MRN: [id number]  Unit/Bed#: S -01 Encounter: [id number]    Assessment:  72 yo male with Hx of recurrent sigmoid appetite diverticulitis, s/p lap hand-assisted sigmoidectomy .    Plan:  -Advance to clears toast crackers  -IV fluids  -DC patel  -Monitor GAURAV character and output  -Maintain abdominal binder  -Mepilex down POD 2  -PT/OT  -DVT ppx  -OOB/ ambulation  -Incentive Spirometry  -Please message the General surgery resident role with any concerns      Subjective/Objective     Subjective:   Patient seen and examined at bedside, in no acute distress. No acute events overnight.  Pain controlled.  No bowel movements or flatus.  Tolerating clear liquid diet.  Patient is yet to ambulate.  No nausea vomiting fevers chills chest pain or shortness of breath.    Objective:   Vitals:Blood pressure 114/67, pulse 68, temperature 98.4 °F (36.9 °C), temperature source Oral, resp. rate 16, height 5' 6\" (1.676 m), weight 83.5 kg (184 lb), SpO2 90%.  Temp (24hrs), Av °F (36.7 °C), Min:97.4 °F (36.3 °C), Max:98.6 °F (37 °C)      I/O          0701  15 0700 08/15 0701  16 0700  0701  / 0700    IV Piggyback   150    Total Intake(mL/kg)   150 (1.8)    Net   +150                   Invasive Devices       Peripheral Intravenous Line  Duration             Peripheral IV 24 Left;Dorsal (posterior) Hand <1 day    Peripheral IV 24 Right;Ventral (anterior) Hand <1 day              Drain  Duration             Closed/Suction Drain Inferior;Midline Bulb <1 day    Urethral Catheter Latex 16 Fr. <1 day                    Physical Exam:  GEN: NAD  HEENT: MMM  CV: well perfused RR  Lung: Normal effort  Ab: Soft, ND, NT, GAURAV with serosanguineous output  Extrem: No CCE   Neuro: A+Ox3     Lab Results   Component Value Date    WBC 11.34 (H) 2024    HGB 13.2 2024    HCT 40.7 2024    MCV 86 2024     2024      Lab " Results   Component Value Date    SODIUM 140 08/02/2024    K 4.7 08/02/2024     08/02/2024    CO2 24 08/02/2024    AGAP 11 08/02/2024    BUN 27 08/02/2024    CREATININE 1.48 (H) 08/02/2024    GLUC 133 (H) 08/02/2024    CALCIUM 9.7 08/02/2024    AST 14 07/29/2024    ALT 20 07/29/2024    ALKPHOS 60 07/29/2024    TP 8.2 07/29/2024    TBILI 1.49 (H) 07/29/2024    EGFR 49 (L) 08/02/2024       VTE Prophylaxis: Sequential compression device (Venodyne)         Cristhian Bonner MD  8/17/2024  7:02 AM

## 2024-08-16 NOTE — OP NOTE
OPERATIVE REPORT  PATIENT NAME: Amrit Becerra    :  1950  MRN: [id number]  Pt Location: AN OR ROOM 01    SURGERY DATE: 2024    Surgeons and Role:     * Mk Naqvi MD - Primary    Preop Diagnosis:  Sigmoid diverticulitis [K57.32]  Type 2 diabetes mellitus, without long-term current use of insulin (HCC) [E11.9]    Post-Op Diagnosis Codes:     * Sigmoid diverticulitis [K57.32]     * Type 2 diabetes mellitus, without long-term current use of insulin (HCC) [E11.9]    Procedure(s):  RESECTION COLON SIGMOID LAPAROSCOPIC HAND-ASSISTED  Flexible sigmoidoscopy      Specimen(s):  ID Type Source Tests Collected by Time Destination   1 :  Tissue Large Intestine, Sigmoid Colon TISSUE EXAM Mk Naqvi MD 2024  2:50 PM        Estimated Blood Loss:   100 mL    Drains:  Closed/Suction Drain Inferior;Midline Bulb (Active)   Number of days: 0       Urethral Catheter Latex 16 Fr. (Active)   Number of days: 0       Anesthesia Type:   General    Operative Indications:  Sigmoid diverticulitis [K57.32]  Type 2 diabetes mellitus, without long-term current use of insulin (HCC) [E11.9]      Operative Findings:        Complications:   None    Procedure and Technique:  Patient was identified visually and by armband.  Placed in the supine position.  After anesthesia the atient was placed in modified lithotomy.  Mcneal catheter was inserted.  Area was prepped and draped in sterile fashion, antibiotics up-to-date.  A thrombin pumps in place.  Timeout was then completed.    HandPort incision was made in the lower aspect of the abdomen.  Quarter percent Marcaine with epinephrine was utilized throughout the procedure.  Incision was made and carried down to skin subtenons tissue.  The abdomen was then entered.  Examination revealed distal sigmoid diverticular thickening and changes.  The colon was foreshortened and not redundant.    An open approach to the white line of Toldt was divided.  This was done to mobilize the  rectosigmoid region.  Once this was completed 3 additional 5 mm ports were then placed in the upper abdomen under direct vision after Marcaine instillation.  CO2 commenced.  The retroperitoneal attachments were divided up to the distal splenic flexure.  This provided some additional advancement, although the left colon is quite straightened and not redundant.    The laparoscopic component was then ended.  The top to the HandPort was opened.  Additional dissection ensued.  The segment of transection was identified in the distal left colon.  This was divided using SARAY stapling device.  LigaSure device was then utilized to divide the miso of the sigmoid colon down to the mid rectum.  Care was taken towards identification of retroperitoneal structures.  The rectum was then divided with the contour stapler.    The selected set of transection was adequate for anastomosis without undue tension.  At this point the staple line from the SARAY was removed and a 29 mm anvil was introduced.  A end-to-end anastomosis was then completed using a EEA device.  Flexible sigmoidoscopy and air leak test were negative.  2 donuts were present on the EEA.    There was irrigated aspirated dry.  Emesis was short.  GAURAV drain was placed into the pelvis.  The pericolonic tissues within his pexied into the pelvis.  Omentum was then pexied into the distal component of the incision or the peritoneal flap.  This covered the small bowel.    Fascia was closed with a running #1 PDS suture tied at the midline.  Staples were used for skin.  The patient tolerated this procedure well.  Sponge and instrument count correct x 2.   I was present for the entire procedure.    Patient Disposition:  PACU     This procedure was not performed to treat colon cancer through resection      SIGNATURE: Mk Naqvi MD  DATE: August 16, 2024  TIME: 4:33 PM

## 2024-08-17 LAB
ANION GAP SERPL CALCULATED.3IONS-SCNC: 9 MMOL/L (ref 4–13)
BUN SERPL-MCNC: 18 MG/DL (ref 5–25)
CALCIUM SERPL-MCNC: 8.2 MG/DL (ref 8.4–10.2)
CHLORIDE SERPL-SCNC: 106 MMOL/L (ref 96–108)
CO2 SERPL-SCNC: 20 MMOL/L (ref 21–32)
CREAT SERPL-MCNC: 1.17 MG/DL (ref 0.6–1.3)
ERYTHROCYTE [DISTWIDTH] IN BLOOD BY AUTOMATED COUNT: 13.8 % (ref 11.6–15.1)
GFR SERPL CREATININE-BSD FRML MDRD: 61 ML/MIN/1.73SQ M
GLUCOSE SERPL-MCNC: 138 MG/DL (ref 65–140)
HCT VFR BLD AUTO: 31.2 % (ref 36.5–49.3)
HGB BLD-MCNC: 10.3 G/DL (ref 12–17)
MCH RBC QN AUTO: 28.8 PG (ref 26.8–34.3)
MCHC RBC AUTO-ENTMCNC: 33 G/DL (ref 31.4–37.4)
MCV RBC AUTO: 87 FL (ref 82–98)
PLATELET # BLD AUTO: 227 THOUSANDS/UL (ref 149–390)
PMV BLD AUTO: 9.2 FL (ref 8.9–12.7)
POTASSIUM SERPL-SCNC: 4.6 MMOL/L (ref 3.5–5.3)
RBC # BLD AUTO: 3.58 MILLION/UL (ref 3.88–5.62)
SODIUM SERPL-SCNC: 135 MMOL/L (ref 135–147)
WBC # BLD AUTO: 13.21 THOUSAND/UL (ref 4.31–10.16)

## 2024-08-17 PROCEDURE — 80048 BASIC METABOLIC PNL TOTAL CA: CPT | Performed by: SURGERY

## 2024-08-17 PROCEDURE — 85027 COMPLETE CBC AUTOMATED: CPT | Performed by: SURGERY

## 2024-08-17 PROCEDURE — 97167 OT EVAL HIGH COMPLEX 60 MIN: CPT

## 2024-08-17 PROCEDURE — 99024 POSTOP FOLLOW-UP VISIT: CPT | Performed by: SURGERY

## 2024-08-17 RX ORDER — DEXTROSE MONOHYDRATE, SODIUM CHLORIDE, AND POTASSIUM CHLORIDE 50; 1.49; 4.5 G/1000ML; G/1000ML; G/1000ML
100 INJECTION, SOLUTION INTRAVENOUS CONTINUOUS
Status: DISCONTINUED | OUTPATIENT
Start: 2024-08-17 | End: 2024-08-18

## 2024-08-17 RX ADMIN — ACETAMINOPHEN 650 MG: 325 TABLET, FILM COATED ORAL at 10:13

## 2024-08-17 RX ADMIN — HYDROMORPHONE HYDROCHLORIDE 1 MG: 1 INJECTION, SOLUTION INTRAMUSCULAR; INTRAVENOUS; SUBCUTANEOUS at 03:27

## 2024-08-17 RX ADMIN — TAMSULOSIN HYDROCHLORIDE 0.4 MG: 0.4 CAPSULE ORAL at 16:28

## 2024-08-17 RX ADMIN — AMLODIPINE BESYLATE 10 MG: 10 TABLET ORAL at 09:35

## 2024-08-17 RX ADMIN — SOTALOL HYDROCHLORIDE 120 MG: 80 TABLET ORAL at 09:34

## 2024-08-17 RX ADMIN — OXYCODONE HYDROCHLORIDE AND ACETAMINOPHEN 1 TABLET: 5; 325 TABLET ORAL at 16:52

## 2024-08-17 RX ADMIN — HEPARIN SODIUM 5000 UNITS: 5000 INJECTION INTRAVENOUS; SUBCUTANEOUS at 21:37

## 2024-08-17 RX ADMIN — PRAVASTATIN SODIUM 20 MG: 20 TABLET ORAL at 16:28

## 2024-08-17 RX ADMIN — DEXTROSE, SODIUM CHLORIDE, AND POTASSIUM CHLORIDE 100 ML/HR: 5; .45; .15 INJECTION INTRAVENOUS at 20:52

## 2024-08-17 RX ADMIN — OXYCODONE HYDROCHLORIDE AND ACETAMINOPHEN 1 TABLET: 5; 325 TABLET ORAL at 12:36

## 2024-08-17 RX ADMIN — HEPARIN SODIUM 5000 UNITS: 5000 INJECTION INTRAVENOUS; SUBCUTANEOUS at 11:50

## 2024-08-17 RX ADMIN — DEXTROSE, SODIUM CHLORIDE, AND POTASSIUM CHLORIDE 100 ML/HR: 5; .45; .15 INJECTION INTRAVENOUS at 11:47

## 2024-08-17 RX ADMIN — B-COMPLEX W/ C & FOLIC ACID TAB 1 TABLET: TAB at 09:35

## 2024-08-17 RX ADMIN — HYDROMORPHONE HYDROCHLORIDE 1 MG: 1 INJECTION, SOLUTION INTRAMUSCULAR; INTRAVENOUS; SUBCUTANEOUS at 07:37

## 2024-08-17 RX ADMIN — OXYCODONE HYDROCHLORIDE AND ACETAMINOPHEN 1 TABLET: 5; 325 TABLET ORAL at 07:41

## 2024-08-17 RX ADMIN — AMLODIPINE BESYLATE 10 MG: 10 TABLET ORAL at 21:36

## 2024-08-17 RX ADMIN — HEPARIN SODIUM 5000 UNITS: 5000 INJECTION INTRAVENOUS; SUBCUTANEOUS at 16:28

## 2024-08-17 RX ADMIN — OXYCODONE HYDROCHLORIDE AND ACETAMINOPHEN 1 TABLET: 5; 325 TABLET ORAL at 21:37

## 2024-08-17 RX ADMIN — SODIUM CHLORIDE, SODIUM LACTATE, POTASSIUM CHLORIDE, AND CALCIUM CHLORIDE 100 ML/HR: .6; .31; .03; .02 INJECTION, SOLUTION INTRAVENOUS at 10:08

## 2024-08-17 RX ADMIN — SOTALOL HYDROCHLORIDE 120 MG: 80 TABLET ORAL at 18:04

## 2024-08-17 NOTE — QUICK NOTE
"Post Op Check:    Patient seen and examined at bedside, in no acute distress.   Patient reports his pain is well-controlled.  No nausea vomiting fevers chills chest pain or shortness of breath.  Patient is tolerating clear liquids.  Patient is yet to ambulate.  No bowel movements or flatus as of yet.      Vitals:Blood pressure 142/79, pulse 78, temperature 98.3 °F (36.8 °C), resp. rate 18, height 5' 6\" (1.676 m), weight 83.5 kg (184 lb), SpO2 97%.  Temp (24hrs), Av.8 °F (36.6 °C), Min:97.4 °F (36.3 °C), Max:98.3 °F (36.8 °C)      General: NAD  HENT: NCAT MMM  Neck: supple, no JVD  CV: nl rate  Lungs: nl wob. No resp distress  ABD: Soft, nontender, moderately distended, GAURAV drain sanguinous/serosanguineous output, dressings on incision clean dry intact, abdominal binder in place  Extrem: No CCE  Neuro: AAOx3       I/O         08/15 0701   0700  0701   0700    I.V. (mL/kg)  2000 (24)    IV Piggyback  150    Total Intake(mL/kg)  2150 (25.7)    Urine (mL/kg/hr)  300    Drains  65    Blood  100    Total Output  465    Net  +1685                  Invasive Devices       Peripheral Intravenous Line  Duration             Peripheral IV 24 Left;Dorsal (posterior) Hand <1 day    Peripheral IV 24 Right;Ventral (anterior) Hand <1 day              Drain  Duration             Closed/Suction Drain Inferior;Midline Bulb <1 day    Urethral Catheter Latex 16 Fr. <1 day                      Plan:  Diet Surgical; Clear Liquid; Gluten Free, No Carbonation  IV fluids  Continue to monitor abdominal exam  Monitor GAURAV drain output regular  Maintain abdominal binder  Mental exam POD 2  Maintain Mcneal  Pain and nausea control PRN    Cristhian Bonner MD  2024 10:32 PM   "

## 2024-08-17 NOTE — PLAN OF CARE
Problem: PAIN - ADULT  Goal: Verbalizes/displays adequate comfort level or baseline comfort level  Description: Interventions:  - Encourage patient to monitor pain and request assistance  - Assess pain using appropriate pain scale  - Administer analgesics based on type and severity of pain and evaluate response  - Implement non-pharmacological measures as appropriate and evaluate response  - Consider cultural and social influences on pain and pain management  - Notify physician/advanced practitioner if interventions unsuccessful or patient reports new pain  Outcome: Progressing     Problem: INFECTION - ADULT  Goal: Absence or prevention of progression during hospitalization  Description: INTERVENTIONS:  - Assess and monitor for signs and symptoms of infection  - Monitor lab/diagnostic results  - Monitor all insertion sites, i.e. indwelling lines, tubes, and drains  - Monitor endotracheal if appropriate and nasal secretions for changes in amount and color  - [place] appropriate cooling/warming therapies per order  - Administer medications as ordered  - Instruct and encourage patient and family to use good hand hygiene technique  - Identify and instruct in appropriate isolation precautions for identified infection/condition  Outcome: Progressing  Goal: Absence of fever/infection during neutropenic period  Description: INTERVENTIONS:  - Monitor WBC    Outcome: Progressing     Problem: SAFETY ADULT  Goal: Patient will remain free of falls  Description: INTERVENTIONS:  - Educate patient/family on patient safety including physical limitations  - Instruct patient to call for assistance with activity   - Consult OT/PT to assist with strengthening/mobility   - Keep Call bell within reach  - Keep bed low and locked with side rails adjusted as appropriate  - Keep care items and personal belongings within reach  - Initiate and maintain comfort rounds  - Make Fall Risk Sign visible to staff  - Apply yellow socks and bracelet  for high fall risk patients  - Consider moving patient to room near nurses station  Outcome: Progressing  Goal: Maintain or return to baseline ADL function  Description: INTERVENTIONS:  -  Assess patient's ability to carry out ADLs; assess patient's baseline for ADL function and identify physical deficits which impact ability to perform ADLs (bathing, care of mouth/teeth, toileting, grooming, dressing, etc.)  - Assess/evaluate cause of self-care deficits   - Assess range of motion  - Assess patient's mobility; develop plan if impaired  - Assess patient's need for assistive devices and provide as appropriate  - Encourage maximum independence but intervene and supervise when necessary  - Involve family in performance of ADLs  - Assess for home care needs following discharge   - Consider OT consult to assist with ADL evaluation and planning for discharge  - Provide patient education as appropriate  Outcome: Progressing  Goal: Maintains/Returns to pre admission functional level  Description: INTERVENTIONS:  - Perform AM-PAC 6 Click Basic Mobility/ Daily Activity assessment daily.  - Set and communicate daily mobility goal to care team and patient/family/caregiver.   - Collaborate with rehabilitation services on mobility goals if consulted  - Perform Range of Motion .  - Reposition patient   - Dangle patient   - Stand patient   - Ambulate patient   - Out of bed to chair   - Out of bed for meals   - Out of bed for toileting  - Record patient progress and toleration of activity level   Outcome: Progressing     Problem: DISCHARGE PLANNING  Goal: Discharge to home or other facility with appropriate resources  Description: INTERVENTIONS:  - Identify barriers to discharge w/patient and caregiver  - Arrange for needed discharge resources and transportation as appropriate  - Identify discharge learning needs (meds, wound care, etc.)  - Arrange for interpretive services to assist at discharge as needed  - Refer to Case Management  Department for coordinating discharge planning if the patient needs post-hospital services based on physician/advanced practitioner order or complex needs related to functional status, cognitive ability, or social support system  Outcome: Progressing     Problem: Knowledge Deficit  Goal: Patient/family/caregiver demonstrates understanding of disease process, treatment plan, medications, and discharge instructions  Description: Complete learning assessment and assess knowledge base.  Interventions:  - Provide teaching at level of understanding  - Provide teaching via preferred learning methods  Outcome: Progressing     Problem: Prexisting or High Potential for Compromised Skin Integrity  Goal: Skin integrity is maintained or improved  Description: INTERVENTIONS:  - Identify patients at risk for skin breakdown  - Assess and monitor skin integrity  - Assess and monitor nutrition and hydration status  - Monitor labs   - Assess for incontinence   - Turn and reposition patient  - Assist with mobility/ambulation  - Relieve pressure over bony prominences  - Avoid friction and shearing  - Provide appropriate hygiene as needed including keeping skin clean and dry  - Evaluate need for skin moisturizer/barrier cream  - Collaborate with interdisciplinary team   - Patient/family teaching  - Consider wound care consult   Outcome: Progressing

## 2024-08-17 NOTE — PLAN OF CARE
Problem: OCCUPATIONAL THERAPY ADULT  Goal: Performs self-care activities at highest level of function for planned discharge setting.  See evaluation for individualized goals.  Description: Treatment Interventions: ADL retraining, Functional transfer training, Endurance training, Equipment evaluation/education, Compensatory technique education, Continued evaluation, Energy conservation, Activityengagement          See flowsheet documentation for full assessment, interventions and recommendations.   Note: Limitation: Decreased ADL status, Decreased endurance, Decreased self-care trans, Decreased high-level ADLs  Prognosis: Good  Assessment: Pt is a 73 y.o. male seen for OT evaluation s/p admission to [facility] on 8/16/2024 due to diverticulitis. Diagnosed with S/P laparoscopic-assisted sigmoidectomy. Personal and env factors supporting pt at time of IE include age, (I) PLOF, supportive wife, and accessible home environment. Personal and env factors inhibiting engagement in occupations include difficulty completing ADLs and difficulty completing IADLs. Performance deficits that affect the pt’s occupational performance can be seen above. Due to pt's current functional limitations and medical complications pt is functioning below baseline. Pt would benefit from continued skilled OT treatment in order to maximize safety, independence and overall performance with ADLs, IADLs, functional mobility, and functional transfers in order to achieve highest level of function.     Rehab Resource Intensity Level, OT: III (Minimum Resource Intensity) (vs. no needs pending continued progress)

## 2024-08-17 NOTE — OCCUPATIONAL THERAPY NOTE
Occupational Therapy Evaluation     Patient Name: Amrit Becerra  Today's Date: 8/17/2024  Problem List  Principal Problem:    S/P laparoscopic-assisted sigmoidectomy  Active Problems:    BPH (benign prostatic hyperplasia)    Diverticulitis    Stage 3a chronic kidney disease (CKD) (HCC)    TAMARA (obstructive sleep apnea)    Type 2 diabetes mellitus, without long-term current use of insulin (HCC)    Hypertension    Past Medical History  Past Medical History:   Diagnosis Date    Atrial fibrillation (HCC)     Benign localized prostatic hyperplasia with lower urinary tract symptoms (LUTS)     Bilateral hydrocele     CPAP (continuous positive airway pressure) dependence     Diabetes mellitus (HCC)     Diverticulitis of colon     Hyperlipidemia     Hypertension     Other specified depressive episodes     Sleep apnea     Type 2 diabetes mellitus (HCC)      Past Surgical History  Past Surgical History:   Procedure Laterality Date    ATRIAL CARDIAC PACEMAKER INSERTION      CARDIAC SURGERY  10/2018    Ablation    CARPAL TUNNEL RELEASE Bilateral 1988    CATARACT EXTRACTION, BILATERAL Bilateral 2015    COLONOSCOPY      CYSTOSCOPY  07/2016    HERNIA REPAIR Bilateral 2003    HYDROCELE EXCISION / REPAIR Bilateral 07/2016    THROAT SURGERY  1990    THUMB ARTHROSCOPY Right 2016 08/17/24 0841   OT Last Visit   OT Visit Date 08/17/24   Note Type   Note type Evaluation   Pain Assessment   Pain Assessment Tool 0-10   Pain Score No Pain   Restrictions/Precautions   Weight Bearing Precautions Per Order No   Braces or Orthoses   (abdominal binder & GAURAV drain)   Other Precautions Chair Alarm;Bed Alarm;Fall Risk;Multiple lines   Home Living   Type of Home Mobile home   Home Layout One level;Stairs to enter with rails;Performs ADLs on one level  (4 + 1 BRANNON)   Bathroom Shower/Tub Tub/shower unit   Bathroom Toilet Standard   Bathroom Equipment   (Will put GBs in at some point)   Bathroom Accessibility Accessible   Home Equipment   (no  AD)   Prior Function   Level of [place] Independent with ADLs;Independent with functional mobility;Independent with IADLS   Lives With Spouse   Receives Help From Family   IADLs Independent with driving;Independent with meal prep;Independent with medication management   Falls in the last 6 months 0   Vocational Retired   Lifestyle   Autonomy PTA pt was (I) c ADLs and IADLs, (+) , denies falls, lives w spouse and enjoys golfing and traveling   Reciprocal Relationships spouse   Service to Others volunteers at [facility] as a    Intrinsic Gratification for; to (do) and volunteering   General   Additional Pertinent History BPH, type 2 diabetes, hypertension, CKD   Family/Caregiver Present Yes   Additional General Comments Pt was pleasant and agreeable to work with OT   ADL   Where Assessed Edge of bed   Eating Assistance 7  Independent   Grooming Assistance 5  Supervision/Setup   UB Bathing Assistance 5  Supervision/Setup   LB Bathing Assistance 4  Minimal Assistance   UB Dressing Assistance 4  Minimal Assistance   UB Dressing Deficit Thread LUE;Thread RUE;Pull around back;Increased time to complete   LB Dressing Assistance 4  Minimal Assistance   LB Dressing Deficit Don/doff R sock;Don/doff L sock   Toileting Assistance  4  Minimal Assistance  (Pt c patel placed at this time and denied use of bathroom during session)   Bed Mobility   Supine to Sit 5  Supervision   Additional items HOB elevated   Sit to Supine Unable to assess   Additional Comments Pt seated OOB in recliner chair at end of session   Transfers   Sit to Stand 5  Supervision   Additional items Assist x 1;Increased time required   Stand to Sit 5  Supervision   Additional items Assist x 1;Increased time required   Functional Mobility   Functional Mobility 4  Minimal assistance  (progressing to CGA throughout session)   Additional Comments short distance fnxl mobility, pt denied further functional mobility at this time   Additional items Rolling  walker  (explained that he could progress to use of SPC or no device however pt felt more comfortable with use of RW at this time)   Balance   Static Sitting Good   Dynamic Sitting Good   Static Standing Fair +   Dynamic Standing Fair +   Ambulatory Fair +   Activity Tolerance   Activity Tolerance Patient limited by fatigue   Nurse Made Aware RN aware   RUE Assessment   RUE Assessment WFL   LUE Assessment   LUE Assessment WFL   Vision-Basic Assessment   Current Vision Wears glasses only for reading   Cognition   Overall Cognitive Status WFL   Arousal/Participation Alert;Cooperative   Attention Within functional limits   Orientation Level Oriented X4   Memory Within functional limits   Following Commands Follows all commands and directions without difficulty   Assessment   Limitation Decreased ADL status;Decreased endurance;Decreased self-care trans;Decreased high-level ADLs   Prognosis Good   Assessment Pt is a 73 y.o. male seen for OT evaluation s/p admission to [facility] on 8/16/2024 due to diverticulitis. Diagnosed with S/P laparoscopic-assisted sigmoidectomy. Personal and env factors supporting pt at time of IE include age, (I) PLOF, supportive wife, and accessible home environment. Personal and env factors inhibiting engagement in occupations include difficulty completing ADLs and difficulty completing IADLs. Performance deficits that affect the pt’s occupational performance can be seen above. Due to pt's current functional limitations and medical complications pt is functioning below baseline. Pt would benefit from continued skilled OT treatment in order to maximize safety, independence and overall performance with ADLs, IADLs, functional mobility, and functional transfers in order to achieve highest level of function.   Goals   Patient Goals to go home   LTG Time Frame 3-7   Long Term Goal see below   Plan   Treatment Interventions ADL retraining;Functional transfer training;Endurance training;Equipment  evaluation/education;Compensatory technique education;Continued evaluation;Energy conservation;Activityengagement   Goal Expiration Date 08/24/24   OT Treatment Day 0   OT Frequency 2-3x/wk   Discharge Recommendation   Rehab Resource Intensity Level, OT III (Minimum Resource Intensity)  (vs. no needs pending continued progress)   AM-PAC Daily Activity Inpatient   Lower Body Dressing 3   Bathing 3   Toileting 3   Upper Body Dressing 3   Grooming 3   Eating 4   Daily Activity Raw Score 19   Daily Activity Standardized Score (Calc for Raw Score >=11) 40.22   AM-PAC Applied Cognition Inpatient   Following a Speech/Presentation 4   Understanding Ordinary Conversation 4   Taking Medications 4   Remembering Where Things Are Placed or Put Away 4   Remembering List of 4-5 Errands 4   Taking Care of Complicated Tasks 4   Applied Cognition Raw Score 24   Applied Cognition Standardized Score 62.21   End of Consult   Education Provided Yes;Family or social support of family present for education by provider   Patient Position at End of Consult Bedside chair;Bed/Chair alarm activated;All needs within reach   Nurse Communication Nurse aware of consult     GOALS    Pt will improve activity tolerance to G for min 30 min txment sessions for increase engagement in functional tasks    Pt will complete bed mobility at a Mod I level w/ G balance/safety demonstrated to decrease caregiver assistance required     Pt will complete UB dressing/self care w/ mod I using adaptive device and DME as needed     Pt will complete LB dressing/self care w/ mod I using adaptive device and DME as needed    Pt will complete toileting w/ mod I w/ G hygiene/thoroughness using DME as needed    Pt will improve functional transfers to Mod I on/off all surfaces using DME as needed w/ G balance/safety     Pt will improve functional mobility during ADL/IADL/leisure tasks to Mod I using DME as needed w/ G balance/safety     Pt will demonstrate G carryover of  pt/caregiver education and training as appropriate w/o cues w/ good tolerance to increase safety during functional tasks    Pt will demonstrate 100% carryover of energy conservation techniques t/o functional I/ADL/leisure tasks w/o cues s/p skilled education to increase endurance during functional tasks    Pt will participate in simulated IADL management task to increase independence to Mod I w/ G safety and endurance      The patient's raw score on the -PAC Daily Activity Inpatient Short Form is 19. A raw score of greater than or equal to 19 suggests the patient may benefit from discharge to home. Please refer to the recommendation of the Occupational Therapist for safe discharge planning.    Leonarda Dominguez, OTR/L

## 2024-08-17 NOTE — PROGRESS NOTES
Progress Note  Amrit Becerra 73 y.o. male MRN: [id number]  Unit/Bed#: S -01 Encounter: [id number]    Assessment:  73M with hx of recurrent sigmoid diverticulitis, s/p 8/16 lap hand-assisted sigmoidectomy.    Plan:  - advance to lo-res, d/c IVF  - maintain abdominal binder  - stop SQH, restart xarelto  - OT: III    Subjective/Objective   NAOE. Reports minimal lower abdominal pain. Tolerating diet, no n/v. No BM, passing flatus. Voiding. Walking.    Physical Exam:  General: NAD  CV: nl rate  Lungs: nl wob. No resp distress.  ABD: Soft, distended, lower tenderness though improving, binder in place, mepilex taken down, incisions CDI. GAURAV with 75cc ss/serous output.  Extrem: No CCE    Patient Vitals for the past 24 hrs:   BP Temp Pulse Resp SpO2   08/17/24 2257 115/57 99 °F (37.2 °C) 74 -- 93 %   08/17/24 2256 115/57 -- 70 -- 92 %   08/17/24 2135 118/67 98.3 °F (36.8 °C) 66 17 94 %   08/17/24 1916 111/64 98.5 °F (36.9 °C) 68 17 94 %   08/17/24 1515 120/60 98.4 °F (36.9 °C) 68 16 94 %   08/17/24 1137 133/74 97.9 °F (36.6 °C) 70 19 95 %   08/17/24 0807 128/72 97.6 °F (36.4 °C) 73 -- 90 %       I/O         08/15 0701  08/16 0700 08/16 0701  08/17 0700 08/17 0701  08/18 0700    P.O.  236 540    I.V. (mL/kg)  2493.3 (29.9)     IV Piggyback  150     Total Intake(mL/kg)  2879.3 (34.5) 540 (6.5)    Urine (mL/kg/hr)  850 1075 (2.2)    Drains  115     Blood  100     Total Output  1065 1075    Net  +1814.3 -535                   Recent Labs     08/17/24  0501 08/18/24  0426 08/18/24  0635   WBC 13.21* 11.52*  --    HGB 10.3* 10.5*  --     216  --    SODIUM 135  --  137   K 4.6  --  4.2     --  108   CO2 20*  --  24   BUN 18  --  14   CREATININE 1.17  --  1.17   GLUC 138  --  147*   CALCIUM 8.2*  --  8.5   MG  --  1.6*  --    PHOS  --  1.7*  --    EGFR 61  --  61     Lab Results   Component Value Date    BLOODCX No Growth After 5 Days. 05/04/2024    LEUKOCYTESUR SM 08/02/2024    LEUKOCYTESUR Negative 05/04/2024     LEUKOCYTESUR Trace 10/04/2017

## 2024-08-18 LAB
ANION GAP SERPL CALCULATED.3IONS-SCNC: 5 MMOL/L (ref 4–13)
BASOPHILS # BLD AUTO: 0.05 THOUSANDS/ÂΜL (ref 0–0.1)
BASOPHILS NFR BLD AUTO: 0 % (ref 0–1)
BUN SERPL-MCNC: 14 MG/DL (ref 5–25)
CALCIUM SERPL-MCNC: 8.5 MG/DL (ref 8.4–10.2)
CHLORIDE SERPL-SCNC: 108 MMOL/L (ref 96–108)
CO2 SERPL-SCNC: 24 MMOL/L (ref 21–32)
CREAT SERPL-MCNC: 1.17 MG/DL (ref 0.6–1.3)
EOSINOPHIL # BLD AUTO: 0.27 THOUSAND/ÂΜL (ref 0–0.61)
EOSINOPHIL NFR BLD AUTO: 2 % (ref 0–6)
ERYTHROCYTE [DISTWIDTH] IN BLOOD BY AUTOMATED COUNT: 14.1 % (ref 11.6–15.1)
GFR SERPL CREATININE-BSD FRML MDRD: 61 ML/MIN/1.73SQ M
GLUCOSE SERPL-MCNC: 147 MG/DL (ref 65–140)
HCT VFR BLD AUTO: 32.1 % (ref 36.5–49.3)
HGB BLD-MCNC: 10.5 G/DL (ref 12–17)
IMM GRANULOCYTES # BLD AUTO: 0.06 THOUSAND/UL (ref 0–0.2)
IMM GRANULOCYTES NFR BLD AUTO: 1 % (ref 0–2)
LYMPHOCYTES # BLD AUTO: 1.77 THOUSANDS/ÂΜL (ref 0.6–4.47)
LYMPHOCYTES NFR BLD AUTO: 15 % (ref 14–44)
MAGNESIUM SERPL-MCNC: 1.6 MG/DL (ref 1.9–2.7)
MCH RBC QN AUTO: 28.7 PG (ref 26.8–34.3)
MCHC RBC AUTO-ENTMCNC: 32.7 G/DL (ref 31.4–37.4)
MCV RBC AUTO: 88 FL (ref 82–98)
MONOCYTES # BLD AUTO: 0.94 THOUSAND/ÂΜL (ref 0.17–1.22)
MONOCYTES NFR BLD AUTO: 8 % (ref 4–12)
NEUTROPHILS # BLD AUTO: 8.43 THOUSANDS/ÂΜL (ref 1.85–7.62)
NEUTS SEG NFR BLD AUTO: 74 % (ref 43–75)
NRBC BLD AUTO-RTO: 0 /100 WBCS
PHOSPHATE SERPL-MCNC: 1.7 MG/DL (ref 2.3–4.1)
PLATELET # BLD AUTO: 216 THOUSANDS/UL (ref 149–390)
PMV BLD AUTO: 9.1 FL (ref 8.9–12.7)
POTASSIUM SERPL-SCNC: 4.2 MMOL/L (ref 3.5–5.3)
RBC # BLD AUTO: 3.66 MILLION/UL (ref 3.88–5.62)
SODIUM SERPL-SCNC: 137 MMOL/L (ref 135–147)
WBC # BLD AUTO: 11.52 THOUSAND/UL (ref 4.31–10.16)

## 2024-08-18 PROCEDURE — 99024 POSTOP FOLLOW-UP VISIT: CPT | Performed by: SURGERY

## 2024-08-18 PROCEDURE — 80048 BASIC METABOLIC PNL TOTAL CA: CPT

## 2024-08-18 PROCEDURE — 85025 COMPLETE CBC W/AUTO DIFF WBC: CPT

## 2024-08-18 PROCEDURE — 84100 ASSAY OF PHOSPHORUS: CPT

## 2024-08-18 PROCEDURE — 83735 ASSAY OF MAGNESIUM: CPT

## 2024-08-18 RX ORDER — HEPARIN SODIUM 5000 [USP'U]/ML
5000 INJECTION, SOLUTION INTRAVENOUS; SUBCUTANEOUS EVERY 8 HOURS SCHEDULED
Status: DISCONTINUED | OUTPATIENT
Start: 2024-08-18 | End: 2024-08-19

## 2024-08-18 RX ORDER — DEXTROSE MONOHYDRATE, SODIUM CHLORIDE, AND POTASSIUM CHLORIDE 50; 1.49; 4.5 G/1000ML; G/1000ML; G/1000ML
75 INJECTION, SOLUTION INTRAVENOUS CONTINUOUS
Status: DISCONTINUED | OUTPATIENT
Start: 2024-08-18 | End: 2024-08-18

## 2024-08-18 RX ORDER — MAGNESIUM SULFATE HEPTAHYDRATE 40 MG/ML
2 INJECTION, SOLUTION INTRAVENOUS ONCE
Status: COMPLETED | OUTPATIENT
Start: 2024-08-18 | End: 2024-08-18

## 2024-08-18 RX ORDER — DEXTROSE MONOHYDRATE, SODIUM CHLORIDE, AND POTASSIUM CHLORIDE 50; 1.49; 4.5 G/1000ML; G/1000ML; G/1000ML
75 INJECTION, SOLUTION INTRAVENOUS CONTINUOUS
Status: DISCONTINUED | OUTPATIENT
Start: 2024-08-18 | End: 2024-08-19

## 2024-08-18 RX ADMIN — HEPARIN SODIUM 5000 UNITS: 5000 INJECTION INTRAVENOUS; SUBCUTANEOUS at 15:38

## 2024-08-18 RX ADMIN — OXYCODONE HYDROCHLORIDE AND ACETAMINOPHEN 1 TABLET: 5; 325 TABLET ORAL at 05:33

## 2024-08-18 RX ADMIN — OXYCODONE HYDROCHLORIDE AND ACETAMINOPHEN 1 TABLET: 5; 325 TABLET ORAL at 19:30

## 2024-08-18 RX ADMIN — HEPARIN SODIUM 5000 UNITS: 5000 INJECTION INTRAVENOUS; SUBCUTANEOUS at 22:22

## 2024-08-18 RX ADMIN — TAMSULOSIN HYDROCHLORIDE 0.4 MG: 0.4 CAPSULE ORAL at 15:38

## 2024-08-18 RX ADMIN — DEXTROSE, SODIUM CHLORIDE, AND POTASSIUM CHLORIDE 100 ML/HR: 5; .45; .15 INJECTION INTRAVENOUS at 06:12

## 2024-08-18 RX ADMIN — SOTALOL HYDROCHLORIDE 120 MG: 80 TABLET ORAL at 09:28

## 2024-08-18 RX ADMIN — OXYCODONE HYDROCHLORIDE AND ACETAMINOPHEN 1 TABLET: 5; 325 TABLET ORAL at 11:57

## 2024-08-18 RX ADMIN — MAGNESIUM SULFATE HEPTAHYDRATE 2 G: 40 INJECTION, SOLUTION INTRAVENOUS at 11:09

## 2024-08-18 RX ADMIN — SODIUM PHOSPHATE, MONOBASIC, MONOHYDRATE AND SODIUM PHOSPHATE, DIBASIC, ANHYDROUS 21 MMOL: 276; 142 INJECTION, SOLUTION INTRAVENOUS at 11:08

## 2024-08-18 RX ADMIN — HYDROMORPHONE HYDROCHLORIDE 1 MG: 1 INJECTION, SOLUTION INTRAMUSCULAR; INTRAVENOUS; SUBCUTANEOUS at 22:23

## 2024-08-18 RX ADMIN — PRAVASTATIN SODIUM 20 MG: 20 TABLET ORAL at 15:38

## 2024-08-18 RX ADMIN — OXYCODONE HYDROCHLORIDE AND ACETAMINOPHEN 1 TABLET: 5; 325 TABLET ORAL at 01:31

## 2024-08-18 RX ADMIN — DEXTROSE, SODIUM CHLORIDE, AND POTASSIUM CHLORIDE 75 ML/HR: 5; .45; .15 INJECTION INTRAVENOUS at 10:36

## 2024-08-18 RX ADMIN — AMLODIPINE BESYLATE 10 MG: 10 TABLET ORAL at 09:28

## 2024-08-18 RX ADMIN — HEPARIN SODIUM 5000 UNITS: 5000 INJECTION INTRAVENOUS; SUBCUTANEOUS at 05:31

## 2024-08-18 RX ADMIN — SOTALOL HYDROCHLORIDE 120 MG: 80 TABLET ORAL at 17:15

## 2024-08-18 RX ADMIN — AMLODIPINE BESYLATE 10 MG: 10 TABLET ORAL at 22:22

## 2024-08-18 RX ADMIN — B-COMPLEX W/ C & FOLIC ACID TAB 1 TABLET: TAB at 09:28

## 2024-08-18 NOTE — PROGRESS NOTES
"General Surgery  Progress Note   Amrit Becerra 73 y.o. male MRN: [id number]  Unit/Bed#: S -01 Encounter: [id number]    Assessment:  73M with hx of recurrent sigmoid diverticulitis, s/p  lap hand-assisted sigmoidectomy.       Plan:  -Advance to lo-res  -DC IV fluids  -Restart Xarelto, DC SQH  -Pain/ nausea control PRN  -OOB/ ambulation  -Incentive Spirometry  -Please message the General surgery resident role with any concerns      Subjective/Objective     Subjective:   Patient seen and examined at bedside, in no acute distress. No acute events overnight.  Patient reports pain is improving.  Patient is passing flatus no bowel movements.  Patient is tolerating diet.  Patient is ambulating.  Patient is voiding.  No nausea vomiting fevers chills chest pain or shortness of breath.    Objective:   Vitals:Blood pressure 122/65, pulse 70, temperature 97.8 °F (36.6 °C), resp. rate 16, height 5' 6\" (1.676 m), weight 83.5 kg (184 lb), SpO2 93%.  Temp (24hrs), Av.5 °F (36.9 °C), Min:97.8 °F (36.6 °C), Max:98.7 °F (37.1 °C)      I/O          0701   0700  0701   0700  0701  / 0700    P.O. 236 660 240    I.V. (mL/kg) 2493.3 (29.9) 1841.7 (22.1)     IV Piggyback 150      Total Intake(mL/kg) 2879.3 (34.5) 2501.7 (30) 240 (2.9)    Urine (mL/kg/hr) 850 2850 (1.4) 1950 (2.2)    Drains 115 75     Blood 100      Total Output 1065 2925 1950    Net +1814.3 -423.3 -1710                   Invasive Devices       Peripheral Intravenous Line  Duration             Peripheral IV 24 Right;Ventral (anterior) Hand 2 days              Drain  Duration             Closed/Suction Drain Inferior;Midline Bulb 2 days                    Physical Exam:  GEN: NAD  HEENT: MMM  CV: well perfused RR  Lung: Normal effort  Ab: Soft, mild diffuse tenderness, mild distention, GAURAV with light serosanguineous output  Extrem: No CCE   Neuro: A+Ox3     Lab Results   Component Value Date    WBC 11.52 (H) 2024    HGB " 10.5 (L) 08/18/2024    HCT 32.1 (L) 08/18/2024    MCV 88 08/18/2024     08/18/2024      Lab Results   Component Value Date    SODIUM 137 08/18/2024    K 4.2 08/18/2024     08/18/2024    CO2 24 08/18/2024    AGAP 5 08/18/2024    BUN 14 08/18/2024    CREATININE 1.17 08/18/2024    GLUC 147 (H) 08/18/2024    CALCIUM 8.5 08/18/2024    AST 14 07/29/2024    ALT 20 07/29/2024    ALKPHOS 60 07/29/2024    TP 8.2 07/29/2024    TBILI 1.49 (H) 07/29/2024    EGFR 61 08/18/2024       VTE Prophylaxis: Heparin        Cristhian Bonner MD  8/19/2024  6:11 AM

## 2024-08-18 NOTE — CASE MANAGEMENT
Case Management Discharge Planning Note    Patient name Amrit LEON /S -01 MRN [id number]  : 1950 Date 2024       Current Admission Date: 2024  Current Admission Diagnosis:S/P laparoscopic-assisted sigmoidectomy   Patient Active Problem List    Diagnosis Date Noted Date Diagnosed    S/P laparoscopic-assisted sigmoidectomy 2024     Hypertension 2024     Diverticulitis 2024     Cyst of left kidney 2024     Hepatic steatosis 2024     Stage 3a chronic kidney disease (CKD) (HCC) 2024     TAMARA (obstructive sleep apnea) 2024     Persistent atrial fibrillation (HCC) 2024     Type 2 diabetes mellitus, without long-term current use of insulin (HCC) 2024     Overactive bladder 10/29/2018     BPH (benign prostatic hyperplasia) 10/10/2018       LOS (days): 2  Geometric Mean LOS (GMLOS) (days):   Days to GMLOS:     OBJECTIVE:  Risk of Unplanned Readmission Score: 13.36         Current admission status: Inpatient   Preferred Pharmacy:   [facility] [address] [facility] [address] [facility]  [address] [address] [facility] [facility] [address]  Phone: [phone] Fax: [phone]    [id number] [place], [place] - [address]  [address] [facility] [address]  Phone: [phone] Fax: [phone]    [facility] [address]  [address] [place] [address]  Phone: [phone] Fax: [phone]    Primary Care Provider: Sarah Dukes MD    Primary Insurance: MEDICARE  Secondary Insurance: [facility]    DISCHARGE DETAILS:    Discharge planning discussed with:: Patient & Ceclia-spouse  Freedom of Choice: Yes  Comments - Freedom of Choice: CM net with Pt and spouse with an introduction and explanation of role. Pt reported he would prefer outpatient therapy and denied any d/c needs at this time, as spoue will transport upond/c.  CM  contacted family/caregiver?: Yes  Were Treatment Team discharge recommendations reviewed with patient/caregiver?: Yes  Did patient/caregiver verbalize understanding of patient care needs?: Yes  Were patient/caregiver advised of the risks associated with not following Treatment Team discharge recommendations?: Yes    Contacts  Patient Contacts: Crow  Relationship to Patient:: Family  Contact Method: In Person  Reason/Outcome: Discharge Planning    Requested Home Health Care         Is the patient interested in [facility] at discharge?: No         Other Referral/Resources/Interventions Provided:  Interventions: Outpatient PT    Treatment Team Recommendation: Home  Discharge Destination Plan:: Home  Transport at Discharge : Family

## 2024-08-19 LAB
ANION GAP SERPL CALCULATED.3IONS-SCNC: 6 MMOL/L (ref 4–13)
BUN SERPL-MCNC: 10 MG/DL (ref 5–25)
CALCIUM SERPL-MCNC: 9 MG/DL (ref 8.4–10.2)
CHLORIDE SERPL-SCNC: 105 MMOL/L (ref 96–108)
CO2 SERPL-SCNC: 27 MMOL/L (ref 21–32)
CREAT SERPL-MCNC: 1.05 MG/DL (ref 0.6–1.3)
GFR SERPL CREATININE-BSD FRML MDRD: 70 ML/MIN/1.73SQ M
GLUCOSE SERPL-MCNC: 131 MG/DL (ref 65–140)
MAGNESIUM SERPL-MCNC: 2.2 MG/DL (ref 1.9–2.7)
PHOSPHATE SERPL-MCNC: 2.3 MG/DL (ref 2.3–4.1)
POTASSIUM SERPL-SCNC: 4.2 MMOL/L (ref 3.5–5.3)
SODIUM SERPL-SCNC: 138 MMOL/L (ref 135–147)

## 2024-08-19 PROCEDURE — 99024 POSTOP FOLLOW-UP VISIT: CPT | Performed by: SURGERY

## 2024-08-19 PROCEDURE — 84100 ASSAY OF PHOSPHORUS: CPT

## 2024-08-19 PROCEDURE — 80048 BASIC METABOLIC PNL TOTAL CA: CPT

## 2024-08-19 PROCEDURE — 83735 ASSAY OF MAGNESIUM: CPT

## 2024-08-19 RX ORDER — ACETAMINOPHEN 325 MG/1
650 TABLET ORAL EVERY 6 HOURS SCHEDULED
Status: DISCONTINUED | OUTPATIENT
Start: 2024-08-19 | End: 2024-08-20 | Stop reason: HOSPADM

## 2024-08-19 RX ADMIN — ACETAMINOPHEN 650 MG: 325 TABLET, FILM COATED ORAL at 08:49

## 2024-08-19 RX ADMIN — OXYCODONE HYDROCHLORIDE AND ACETAMINOPHEN 1 TABLET: 5; 325 TABLET ORAL at 14:17

## 2024-08-19 RX ADMIN — B-COMPLEX W/ C & FOLIC ACID TAB 1 TABLET: TAB at 08:49

## 2024-08-19 RX ADMIN — SOTALOL HYDROCHLORIDE 120 MG: 80 TABLET ORAL at 17:23

## 2024-08-19 RX ADMIN — RIVAROXABAN 20 MG: 20 TABLET, FILM COATED ORAL at 17:23

## 2024-08-19 RX ADMIN — TAMSULOSIN HYDROCHLORIDE 0.4 MG: 0.4 CAPSULE ORAL at 17:23

## 2024-08-19 RX ADMIN — AMLODIPINE BESYLATE 10 MG: 10 TABLET ORAL at 08:49

## 2024-08-19 RX ADMIN — OXYCODONE HYDROCHLORIDE AND ACETAMINOPHEN 1 TABLET: 5; 325 TABLET ORAL at 06:18

## 2024-08-19 RX ADMIN — OXYCODONE HYDROCHLORIDE AND ACETAMINOPHEN 1 TABLET: 5; 325 TABLET ORAL at 22:43

## 2024-08-19 RX ADMIN — SOTALOL HYDROCHLORIDE 120 MG: 80 TABLET ORAL at 08:49

## 2024-08-19 RX ADMIN — DEXTROSE, SODIUM CHLORIDE, AND POTASSIUM CHLORIDE 75 ML/HR: 5; .45; .15 INJECTION INTRAVENOUS at 00:29

## 2024-08-19 RX ADMIN — PRAVASTATIN SODIUM 20 MG: 20 TABLET ORAL at 17:23

## 2024-08-19 RX ADMIN — AMLODIPINE BESYLATE 10 MG: 10 TABLET ORAL at 22:43

## 2024-08-19 RX ADMIN — HEPARIN SODIUM 5000 UNITS: 5000 INJECTION INTRAVENOUS; SUBCUTANEOUS at 06:18

## 2024-08-19 RX ADMIN — ACETAMINOPHEN 650 MG: 325 TABLET, FILM COATED ORAL at 17:23

## 2024-08-19 NOTE — PROGRESS NOTES
Progress Note  Amrit Becerra 73 y.o. male MRN: [id number]  Unit/Bed#: S -01 Encounter: [id number]    Assessment:  73M with hx of recurrent sigmoid diverticulitis, s/p 8/16 lap hand-assisted sigmoidectomy.    Plan:  - d/c GAURAV, d/c home today  - lo-res diet  - home xarelto    Subjective/Objective   NAOE. Reports continued lower abdominal pain. Tolerating diet, no n/v. No BM, passing flatus. Voiding. Walking.    Physical Exam:  General: NAD  CV: nl rate  Lungs: nl wob. No resp distress.  ABD: Soft, distended, lower tenderness, CDI, GAURAV with 20cc ss output.  Extrem: No CCE    Patient Vitals for the past 24 hrs:   BP Temp Pulse Resp SpO2   08/19/24 2302 129/66 98.5 °F (36.9 °C) 68 13 94 %   08/19/24 2240 125/68 -- 68 -- 94 %   08/19/24 1459 132/70 98.4 °F (36.9 °C) 79 17 93 %   08/19/24 1058 116/64 98.3 °F (36.8 °C) 66 17 93 %   08/19/24 0704 148/67 98.3 °F (36.8 °C) 75 17 95 %       I/O         08/17 0701  08/18 0700 08/18 0701  08/19 0700 08/19 0701  08/20 0700    P.O. 660 240 480    I.V. (mL/kg) 1841.7 (22.1) 980 (11.7)     IV Piggyback       Total Intake(mL/kg) 2501.7 (30) 1220 (14.6) 480 (5.7)    Urine (mL/kg/hr) 2850 (1.4) 2900 (1.4) 100 (0.5)    Drains 75 30     Blood       Total Output 2925 2930 100    Net -423.3 -1710 +380                   Recent Labs     08/18/24  0426 08/18/24  0635 08/19/24  0435 08/20/24  0503   WBC 11.52*  --   --  8.67   HGB 10.5*  --   --  10.4*     --   --  222   SODIUM  --  137 138  --    K  --  4.2 4.2  --    CL  --  108 105  --    CO2  --  24 27  --    BUN  --  14 10  --    CREATININE  --  1.17 1.05  --    GLUC  --  147* 131  --    CALCIUM  --  8.5 9.0  --    MG 1.6*  --  2.2  --    PHOS 1.7*  --  2.3  --    EGFR  --  61 70  --      Lab Results   Component Value Date    BLOODCX No Growth After 5 Days. 05/04/2024    LEUKOCYTESUR SM 08/02/2024    LEUKOCYTESUR Negative 05/04/2024    LEUKOCYTESUR Trace 10/04/2017

## 2024-08-19 NOTE — PHYSICAL THERAPY NOTE
Physical Therapy Screen Note       08/19/24 1023   Note Type   Note type Screen   Additional Comments referral received for PT eval and txShiva Stanford NSG states pt is ambulating independently. Pt reports walking w/o difficulty and has no concerns regarding discharge from the hospital from mobility perspective. inpatient PT is not indicated due to pt's independnet mobility status. discontinue PT.     Farooq Colbert, PT

## 2024-08-20 VITALS
OXYGEN SATURATION: 95 % | DIASTOLIC BLOOD PRESSURE: 62 MMHG | TEMPERATURE: 98.3 F | HEART RATE: 67 BPM | SYSTOLIC BLOOD PRESSURE: 125 MMHG | RESPIRATION RATE: 19 BRPM | BODY MASS INDEX: 29.57 KG/M2 | HEIGHT: 66 IN | WEIGHT: 184 LBS

## 2024-08-20 LAB
BASOPHILS # BLD AUTO: 0.06 THOUSANDS/ÂΜL (ref 0–0.1)
BASOPHILS NFR BLD AUTO: 1 % (ref 0–1)
EOSINOPHIL # BLD AUTO: 0.83 THOUSAND/ÂΜL (ref 0–0.61)
EOSINOPHIL NFR BLD AUTO: 10 % (ref 0–6)
ERYTHROCYTE [DISTWIDTH] IN BLOOD BY AUTOMATED COUNT: 14.1 % (ref 11.6–15.1)
HCT VFR BLD AUTO: 31.6 % (ref 36.5–49.3)
HGB BLD-MCNC: 10.4 G/DL (ref 12–17)
IMM GRANULOCYTES # BLD AUTO: 0.12 THOUSAND/UL (ref 0–0.2)
IMM GRANULOCYTES NFR BLD AUTO: 1 % (ref 0–2)
LYMPHOCYTES # BLD AUTO: 2 THOUSANDS/ÂΜL (ref 0.6–4.47)
LYMPHOCYTES NFR BLD AUTO: 23 % (ref 14–44)
MCH RBC QN AUTO: 28.9 PG (ref 26.8–34.3)
MCHC RBC AUTO-ENTMCNC: 32.9 G/DL (ref 31.4–37.4)
MCV RBC AUTO: 88 FL (ref 82–98)
MONOCYTES # BLD AUTO: 0.86 THOUSAND/ÂΜL (ref 0.17–1.22)
MONOCYTES NFR BLD AUTO: 10 % (ref 4–12)
NEUTROPHILS # BLD AUTO: 4.8 THOUSANDS/ÂΜL (ref 1.85–7.62)
NEUTS SEG NFR BLD AUTO: 55 % (ref 43–75)
NRBC BLD AUTO-RTO: 0 /100 WBCS
PLATELET # BLD AUTO: 222 THOUSANDS/UL (ref 149–390)
PMV BLD AUTO: 9 FL (ref 8.9–12.7)
RBC # BLD AUTO: 3.6 MILLION/UL (ref 3.88–5.62)
WBC # BLD AUTO: 8.67 THOUSAND/UL (ref 4.31–10.16)

## 2024-08-20 PROCEDURE — 85025 COMPLETE CBC W/AUTO DIFF WBC: CPT | Performed by: PHYSICIAN ASSISTANT

## 2024-08-20 PROCEDURE — 99024 POSTOP FOLLOW-UP VISIT: CPT | Performed by: PHYSICIAN ASSISTANT

## 2024-08-20 PROCEDURE — 99024 POSTOP FOLLOW-UP VISIT: CPT | Performed by: SURGERY

## 2024-08-20 RX ADMIN — ACETAMINOPHEN 650 MG: 325 TABLET, FILM COATED ORAL at 06:15

## 2024-08-20 RX ADMIN — AMLODIPINE BESYLATE 10 MG: 10 TABLET ORAL at 08:43

## 2024-08-20 RX ADMIN — SOTALOL HYDROCHLORIDE 120 MG: 80 TABLET ORAL at 08:41

## 2024-08-20 RX ADMIN — B-COMPLEX W/ C & FOLIC ACID TAB 1 TABLET: TAB at 08:41

## 2024-08-20 NOTE — CASE MANAGEMENT
Case Management Discharge Planning Note    Patient name Amrit Green S /S -01 MRN [id number]  : 1950 Date 2024       Current Admission Date: 2024  Current Admission Diagnosis:S/P laparoscopic-assisted sigmoidectomy   Patient Active Problem List    Diagnosis Date Noted Date Diagnosed    S/P laparoscopic-assisted sigmoidectomy 2024     Hypertension 2024     Diverticulitis 2024     Cyst of left kidney 2024     Hepatic steatosis 2024     Stage 3a chronic kidney disease (CKD) (HCC) 2024     TAMARA (obstructive sleep apnea) 2024     Persistent atrial fibrillation (HCC) 2024     Type 2 diabetes mellitus, without long-term current use of insulin (HCC) 2024     Overactive bladder 10/29/2018     BPH (benign prostatic hyperplasia) 10/10/2018       LOS (days): 4  Geometric Mean LOS (GMLOS) (days): 2.9  Days to GMLOS:-0.9     OBJECTIVE:  Risk of Unplanned Readmission Score: 13.16         Current admission status: Inpatient   Preferred Pharmacy:   [email or web address] [address] [place], [place] [address] [facility] [address]  Phone: [phone] Fax: [phone]    [id number] [place], [place] - [address]  [address] [facility] [address]  Phone: [phone] Fax: [phone]    [facility] [place], [place] - [address] [facility]  [address] [facility] [address]  Phone: [phone] Fax: [phone]    Primary Care Provider: Sarah Dukes MD    Primary Insurance: MEDICARE  Secondary Insurance: [facility]    DISCHARGE DETAILS:                                                                                        IMM Given (Date):: 24  IMM Given to:: Patient        IMM reviewed with patient, patient agrees with discharge determination.          No CM needs identified during patient's hospitalization. Patient to discharge  home to self. CM will remain available.

## 2024-08-20 NOTE — QUICK NOTE
GAURAV Drain Removal    Area was cleaned with alcohol swab. Suture x 1 was removed from skin. GAURAV bulb was taken off suction prior to removal. GAURAV x 1 was removed from right abdomen without complications. Patient tolerated procedure well. 4x4 dressing was applied overtop.     Patient educated and understands if increased pain, swelling, bleeding from area to call office or come to ED.    Mar Patterson PA-C  8/20/2024 11:35 AM

## 2024-08-20 NOTE — DISCHARGE SUMMARY
Discharge Summary - Amrit Becerra 73 y.o. male MRN: [id number]    Unit/Bed#: S -01 Encounter: [id number]    Admission Date: 8/16/2024   Discharge Date: 08/20/24    Admitting Diagnosis:   Sigmoid diverticulitis [K57.32]  Type 2 diabetes mellitus, without long-term current use of insulin (HCC) [E11.9]    Discharge Diagnoses: Principal Problem:    S/P laparoscopic-assisted sigmoidectomy  Active Problems:    BPH (benign prostatic hyperplasia)    Diverticulitis    Stage 3a chronic kidney disease (CKD) (HCC)    TAMARA (obstructive sleep apnea)    Type 2 diabetes mellitus, without long-term current use of insulin (HCC)    Hypertension      Consultations: none    Procedures Performed: 08/16/2024 - laparoscopic hand-assisted sigmoid resection    Hospital Course: Amrit Becerra is a 73 y.o. male admitted for elective laparoscopic hand-assisted sigmoid resection.  Patient was admitted post-operatively for pain control and post-operative management.  Patient was given clear liquid diet and slowly advanced to tolerance.  Patient was ambulatory and pain controlled.  Patient's xarelto was resumed POD2.  Patient continued to progress with his diet and was stable for discharge 8/20.    Condition at Discharge: good     Discharge instructions/Information to patient and family:   See after visit summary for information provided to patient and family.      Provisions for Follow-Up Care:  See after visit summary for information related to follow-up care and any pertinent home health orders.      Disposition: Home    Planned Readmission: No    Discharge Statement   I spent 12 minutes discharging the patient. This time was spent on the day of discharge. I had direct contact with the patient on the day of discharge.    Discharge Medications:  See after visit summary for reconciled discharge medications provided to patient and family.

## 2024-08-21 PROCEDURE — 88307 TISSUE EXAM BY PATHOLOGIST: CPT | Performed by: PATHOLOGY

## 2024-08-28 ENCOUNTER — TELEPHONE (OUTPATIENT)
Age: 74
End: 2024-08-28

## 2024-08-28 NOTE — TELEPHONE ENCOUNTER
Pt of Dr. Naqvi s/p colon resection 8/16-  Calling in to ask if he is allowed to take the abdominal binder off to shower or if it needs to stay on.   Advised pt that he can take the binder off to shower. He asked if he has to wear it all the time; advised him that it is helpful for comfort with bending or getting up from sitting position, coughing. Pt states he has zero pain and was hoping to not wear it any more. Advised him if he does not want to wear it that is okay but he should not be doing any lifting or strenuous activities. Pt verbalized understanding. Thankful for the information.   Pt praised [facility], very happy with his care.

## 2024-09-03 ENCOUNTER — OFFICE VISIT (OUTPATIENT)
Dept: SURGERY | Facility: CLINIC | Age: 74
End: 2024-09-03

## 2024-09-03 DIAGNOSIS — Z90.49 S/P LAPAROSCOPIC-ASSISTED SIGMOIDECTOMY: Primary | ICD-10-CM

## 2024-09-03 PROCEDURE — 99024 POSTOP FOLLOW-UP VISIT: CPT | Performed by: SURGERY

## 2024-09-03 NOTE — PROGRESS NOTES
"Assessment/Plan: Patient is status post sigmoid resection for diverticulitis.  He presents in follow-up.  Overall he feels well.  He is advance activities nicely.  He is advance his diet also.    On examination the wound is healing well.  Abdomen is soft and nontender.    I encouraged him to take a bran supplement such as Metamucil or a bowl of a dedicated bran cereal with at least 3 glasses of water or juice daily in order to help prevent recurrence.  He is in agreement.  All questions answered.  He is up-to-date on colonoscopy.  No further visits are anticipated.    There are no diagnoses linked to this encounter.    Pathology: Reviewed with patient, all questions answered.       Postoperative restrictions reviewed. All questions answered.       ______________________________________________________  HPI: Patient presents post operatively.  Resection colon sigmoid laparoscopic hand assisted 8/16/2024  Final Diagnosis:  A. Colon, \"Sigmoid colon.\" Resection:  - Portion of sigmoid colon with multiple diverticula  - Five reactive lymph nodes  - Negative for dysplasia or carcinoma                       ROS:  General ROS: negative for - chills, fatigue, fever or night sweats, weight loss  Respiratory ROS: no cough, shortness of breath, or wheezing  Cardiovascular ROS: no chest pain or dyspnea on exertion  Genito-Urinary ROS: no dysuria, trouble voiding, or hematuria  Musculoskeletal ROS: negative for - gait disturbance, joint pain or muscle pain  Neurological ROS: no TIA or stroke symptoms  GI ROS: see HPI  Skin ROS: no new rashes or lesions   Lymphatic ROS: no new adenopathy noted by pt.   GYN ROS: see HPI, no new GYN history or bleeding noted  Psy ROS: no new mental or behavioral disturbances         Patient Active Problem List   Diagnosis    BPH (benign prostatic hyperplasia)    Overactive bladder    Diverticulitis    Cyst of left kidney    Hepatic steatosis    Stage 3a chronic kidney disease (CKD) (HCC)    TAMARA " (obstructive sleep apnea)    Persistent atrial fibrillation (HCC)    Type 2 diabetes mellitus, without long-term current use of insulin (HCC)    S/P laparoscopic-assisted sigmoidectomy    Hypertension       Allergies:  Valsartan and Oxybutynin      Current Outpatient Medications:     amLODIPine (NORVASC) 10 mg tablet, 10 mg 2 (two) times a day, Disp: , Rfl:     Coenzyme Q10 (COQ-10 PO), , Disp: , Rfl:     metFORMIN (GLUCOPHAGE) 500 mg tablet, Take 1,000 mg by mouth 2 (two) times a day, Disp: , Rfl:     Multiple Vitamin (MULTIVITAMIN ADULT PO), , Disp: , Rfl:     Omega-3 Fatty Acids (OMEGA-3 FISH OIL) 1000 MG CAPS, Take by mouth, Disp: , Rfl:     oxyCODONE-acetaminophen (PERCOCET) 5-325 mg per tablet, Take 1 tablet by mouth every 4 (four) hours as needed for moderate pain for up to 10 doses Max Daily Amount: 6 tablets, Disp: 10 tablet, Rfl: 0    simvastatin (ZOCOR) 10 mg tablet, Take 10 mg by mouth every evening, Disp: , Rfl:     sotalol (BETAPACE) 120 mg tablet, 2 (two) times a day, Disp: , Rfl:     spironolactone (ALDACTONE) 50 mg tablet, Take 25 mg by mouth 2 (two) times a day, Disp: , Rfl: 11    tamsulosin (FLOMAX) 0.4 mg, Take 1 capsule (0.4 mg total) by mouth daily with dinner Begin 5 days preoperatively. (Patient not taking: Reported on 8/2/2024), Disp: 10 capsule, Rfl: 0    XARELTO 20 MG tablet, daily, Disp: , Rfl:     Past Medical History:   Diagnosis Date    Atrial fibrillation (HCC)     Benign localized prostatic hyperplasia with lower urinary tract symptoms (LUTS)     Bilateral hydrocele     CPAP (continuous positive airway pressure) dependence     Diabetes mellitus (HCC)     Diverticulitis of colon     Hyperlipidemia     Hypertension     Other specified depressive episodes     Sleep apnea     Type 2 diabetes mellitus (HCC)        Past Surgical History:   Procedure Laterality Date    ATRIAL CARDIAC PACEMAKER INSERTION      CARDIAC SURGERY  10/2018    Ablation    CARPAL TUNNEL RELEASE Bilateral 1988     CATARACT EXTRACTION, BILATERAL Bilateral 2015    COLONOSCOPY      CYSTOSCOPY  07/2016    HERNIA REPAIR Bilateral 2003    HYDROCELE EXCISION / REPAIR Bilateral 07/2016    [place] LAPAROSCOPY COLECTOMY PARTIAL W/ANASTOMOSIS N/A 8/16/2024    Procedure: RESECTION COLON SIGMOID LAPAROSCOPIC HAND-ASSISTED;  Surgeon: Mk Naqvi MD;  Location: [facility];  Service: General    THROAT SURGERY  1990    THUMB ARTHROSCOPY Right 2016       Family History   Problem Relation Age of Onset    Heart disease Mother     Diabetes Sister         reports that he has never smoked. He has never used smokeless tobacco. He reports that he does not drink alcohol and does not use drugs.    PHYSICAL EXAM    There were no vitals taken for this visit.    General: normal, cooperative, no distress  Abdominal: soft, nondistended, or nontender  Incision: clean, dry, and intact and healing well      Mk Naqvi MD    Date: 9/3/2024 Time: 9:47 AM

## 2025-05-02 ENCOUNTER — OFFICE VISIT (OUTPATIENT)
Dept: URGENT CARE | Facility: CLINIC | Age: 75
End: 2025-05-02
Payer: MEDICARE

## 2025-05-02 VITALS
HEIGHT: 66 IN | RESPIRATION RATE: 18 BRPM | WEIGHT: 184 LBS | BODY MASS INDEX: 29.57 KG/M2 | HEART RATE: 66 BPM | SYSTOLIC BLOOD PRESSURE: 136 MMHG | DIASTOLIC BLOOD PRESSURE: 80 MMHG | OXYGEN SATURATION: 95 % | TEMPERATURE: 98.2 F

## 2025-05-02 DIAGNOSIS — J01.10 ACUTE NON-RECURRENT FRONTAL SINUSITIS: Primary | ICD-10-CM

## 2025-05-02 PROCEDURE — 99213 OFFICE O/P EST LOW 20 MIN: CPT

## 2025-05-02 PROCEDURE — G0463 HOSPITAL OUTPT CLINIC VISIT: HCPCS

## 2025-05-02 RX ORDER — FLUTICASONE PROPIONATE 50 MCG
1 SPRAY, SUSPENSION (ML) NASAL DAILY
Qty: 9.9 ML | Refills: 0 | Status: SHIPPED | OUTPATIENT
Start: 2025-05-02

## 2025-05-02 NOTE — PATIENT INSTRUCTIONS
"Patient Education     Sinusitis in adults   The Basics   Written by the doctors and editors at [facility]   What is sinusitis? -- Sinusitis is a condition that can cause a stuffy nose, pain in the face, and discharge or \"mucus\" from the nose.  The sinuses are hollow areas in the bones of the face (figure 1). They have a thin lining that normally makes a small amount of mucus. When this lining gets irritated or infected, it swells and makes extra mucus. This causes symptoms.  Sinusitis usually happens after a person gets sick with a cold. The germs causing the cold can infect the sinuses, too. Sometimes, other germs can be the cause of the infection. Often, a person feels like their cold is getting better. But then, they get sinusitis and begin to feel sick again.  What are the symptoms of sinusitis? -- Common symptoms of sinusitis include:   Stuffy or blocked nose   Thick white, yellow, or green discharge from the nose   Pain in the teeth   Pain or pressure in the face - This often feels worse when a person bends forward.  People with sinusitis can also have other symptoms, such as:   Fever   Cough   Trouble smelling   Ear pressure or fullness   Headache   Bad breath   Feeling tired  Most of the time, symptoms start to improve in 7 to 10 days.  Should I see a doctor or nurse? -- See your doctor or nurse if your symptoms last more than 10 days, or if your symptoms first get better but then get worse.  Rarely, sinusitis can lead to serious problems. See your doctor or nurse right away (do not wait 10 days) if you have:   Fever higher than 102°F (38.9°C)   Sudden and severe pain in the face and head   Trouble seeing, or seeing double   Trouble thinking clearly   Swelling or redness around 1 or both eyes   Stiff neck  Is there anything I can do on my own to feel better? -- Yes. To help with your symptoms, you can:   Take an over-the-counter pain reliever to reduce the pain.   Rinse your nose and sinuses with salt water a " "few times a day - Ask your doctor or nurse about the best way to do this.   Drink plenty of fluids - Staying hydrated might help to thin the mucus and make it drain more easily.  Your doctor might also recommend a steroid nose spray to reduce the swelling in your nose, especially if you have allergies. Talk to your doctor if you are thinking of using a steroid spray.  How is sinusitis treated? -- Most of the time, sinusitis does not need to be treated with antibiotic medicines. This is because most sinusitis is caused by viruses, not bacteria, and antibiotics do not kill viruses. In fact, even sinusitis caused by bacteria will usually get better on its own without antibiotics.  Some people with sinusitis do need treatment with antibiotics. If your symptoms have not improved after 10 days, ask your doctor if you should take antibiotics. They might recommend that you wait 1 more week to see if your symptoms improve. But if you have symptoms such as a fever or a lot of pain, they might prescribe antibiotics. If you do get antibiotics, follow all of your doctor's instructions about taking them.  What if my symptoms do not get better? -- If your symptoms do not get better, talk with your doctor or nurse. They might order tests to figure out why you still have symptoms. These can include:   CT scan or other imaging tests - Imaging tests create pictures of the inside of the body.   A test to look inside the sinuses - For this test, a doctor puts a thin tube with a camera on the end into the nose and up into the sinuses.  Some people get a lot of sinus infections or have symptoms that last at least 3 months. These people can have a different type of sinusitis called \"chronic sinusitis.\" Chronic sinusitis can be caused by different things. For example, some people have growths inside their nose or sinuses that are called \"polyps.\" Other people have allergies that cause their symptoms.  Chronic sinusitis can be treated in " different ways. If you have chronic sinusitis, talk with your doctor about which treatments are right for you.  All topics are updated as new evidence becomes available and our peer review process is complete.  This topic retrieved from [employer] on: Feb 28, 2024.  Topic 42640 Version 21.0  Release: 32.2.4 - C32.58  © 2024 UpToDate, Inc. and/or its affiliates. All rights reserved.  figure 1: Sinuses of the face     The sinuses are hollow areas in the bones of the face. This drawing shows where the sinuses are, from the side and front views. There are 4 pairs of sinuses, named for the bones around them: sphenoid, frontal, ethmoid, and maxillary.  Graphic 639195 Version 3.0  Consumer Information Use and Disclaimer   Disclaimer: This generalized information is a limited summary of diagnosis, treatment, and/or medication information. It is not meant to be comprehensive and should be used as a tool to help the user understand and/or assess potential diagnostic and treatment options. It does NOT include all information about conditions, treatments, medications, side effects, or risks that may apply to a specific patient. It is not intended to be medical advice or a substitute for the medical advice, diagnosis, or treatment of a health care provider based on the health care provider's examination and assessment of a patient's specific and unique circumstances. Patients must speak with a health care provider for complete information about their health, medical questions, and treatment options, including any risks or benefits regarding use of medications. This information does not endorse any treatments or medications as safe, effective, or approved for treating a specific patient. UpToDate, Inc. and its affiliates disclaim any warranty or liability relating to this information or the use thereof.The use of this information is governed by the Terms of Use, available at  https://www.woltersMedaforuwer.com/en/know/clinical-effectiveness-terms. 2024© [employer] and its affiliates and/or licensors. All rights reserved.  Copyright   © 2024 [employer] and/or its affiliates. All rights reserved.

## 2025-05-02 NOTE — PROGRESS NOTES
[facility]        NAME: Amrit Becerra is a 74 y.o. male  : 1950    MRN: [id number]  DATE: May 2, 2025  TIME: 1:56 PM    Assessment and Plan   Acute non-recurrent frontal sinusitis [J01.10]  1. Acute non-recurrent frontal sinusitis  amoxicillin-clavulanate (AUGMENTIN) 875-125 mg per tablet    fluticasone (FLONASE) 50 mcg/act nasal spray        Antibiotics given for sinusitis. Recommend OTC decongestants. Discussed if no improvement, symptoms may be due to seasonal allergies, Claritin recommended.  Follow up with PCP in 3-5 days if no improvement. Proceed to ER if symptoms worsen.    Medical Decision Making     PROBLEM: 1 acute, uncomplicated illness or injury    DATA: Note(s) Reviewed: yes, chart review    RISK: Prescription drug management    TIME: 20 min     Chief Complaint     Chief Complaint   Patient presents with    Sinus Problem     Started 10 days ago. Nasal congestion. Denies cough, ear pressure and sinus pressure. Denies fever.      History of Present Illness     Amrit Becerra is a 74 y.o. male presenting to the office today for facial pain and congestion.  Symptoms have been present for 10 days, and include nasal congestion, blockage, head pressure.  He has tried nothing for his symptoms, no relief.    Review of Systems     Review of Systems   Constitutional:  Negative for chills and fever.   HENT:  Positive for congestion, sinus pressure and sinus pain. Negative for rhinorrhea, sore throat and trouble swallowing.    Respiratory:  Negative for cough, shortness of breath and wheezing.    Gastrointestinal:  Negative for nausea and vomiting.   Genitourinary: Negative.    Musculoskeletal: Negative.    Skin: Negative.      Current Medications       Current Outpatient Medications:     amoxicillin-clavulanate (AUGMENTIN) 875-125 mg per tablet, Take 1 tablet by mouth every 12 (twelve) hours for 7 days, Disp: 14 tablet, Rfl: 0    fluticasone (FLONASE) 50 mcg/act nasal spray, 1 spray into each  nostril daily, Disp: 9.9 mL, Rfl: 0    amLODIPine (NORVASC) 10 mg tablet, 10 mg 2 (two) times a day, Disp: , Rfl:     Coenzyme Q10 (COQ-10 PO), , Disp: , Rfl:     metFORMIN (GLUCOPHAGE) 500 mg tablet, Take 1,000 mg by mouth 2 (two) times a day, Disp: , Rfl:     Multiple Vitamin (MULTIVITAMIN ADULT PO), , Disp: , Rfl:     Omega-3 Fatty Acids (OMEGA-3 FISH OIL) 1000 MG CAPS, Take by mouth, Disp: , Rfl:     oxyCODONE-acetaminophen (PERCOCET) 5-325 mg per tablet, Take 1 tablet by mouth every 4 (four) hours as needed for moderate pain for up to 10 doses Max Daily Amount: 6 tablets, Disp: 10 tablet, Rfl: 0    simvastatin (ZOCOR) 10 mg tablet, Take 10 mg by mouth every evening, Disp: , Rfl:     sotalol (BETAPACE) 120 mg tablet, 2 (two) times a day, Disp: , Rfl:     spironolactone (ALDACTONE) 50 mg tablet, Take 25 mg by mouth 2 (two) times a day, Disp: , Rfl: 11    tamsulosin (FLOMAX) 0.4 mg, Take 1 capsule (0.4 mg total) by mouth daily with dinner Begin 5 days preoperatively. (Patient not taking: Reported on 8/2/2024), Disp: 10 capsule, Rfl: 0    XARELTO 20 MG tablet, daily, Disp: , Rfl:     Current Allergies     Allergies as of 05/02/2025 - Reviewed 05/02/2025   Allergen Reaction Noted    Valsartan Other (See Comments) 09/07/2023    Oxybutynin Palpitations 09/07/2023            The following portions of the patient's history were reviewed and updated as appropriate: allergies, current medications, past family history, past medical history, past social history, past surgical history and problem list.     Past Medical History:   Diagnosis Date    Atrial fibrillation (HCC)     Benign localized prostatic hyperplasia with lower urinary tract symptoms (LUTS)     Bilateral hydrocele     CPAP (continuous positive airway pressure) dependence     Diabetes mellitus (HCC)     Diverticulitis of colon     Hyperlipidemia     Hypertension     Other specified depressive episodes     Sleep apnea     Type 2 diabetes mellitus (HCC)   "      Past Surgical History:   Procedure Laterality Date    ATRIAL CARDIAC PACEMAKER INSERTION      CARDIAC SURGERY  10/2018    Ablation    CARPAL TUNNEL RELEASE Bilateral 1988    CATARACT EXTRACTION, BILATERAL Bilateral 2015    COLONOSCOPY      CYSTOSCOPY  07/2016    HERNIA REPAIR Bilateral 2003    HYDROCELE EXCISION / REPAIR Bilateral 07/2016    [place] LAPAROSCOPY COLECTOMY PARTIAL W/ANASTOMOSIS N/A 8/16/2024    Procedure: RESECTION COLON SIGMOID LAPAROSCOPIC HAND-ASSISTED;  Surgeon: Mk Naqvi MD;  Location: [facility];  Service: General    THROAT SURGERY  1990    THUMB ARTHROSCOPY Right 2016       Family History   Problem Relation Age of Onset    Heart disease Mother     Diabetes Sister        Medications have been verified.    Objective     /80   Pulse 66   Temp 98.2 °F (36.8 °C) (Tympanic)   Resp 18   Ht 5' 6\" (1.676 m)   Wt 83.5 kg (184 lb)   SpO2 95%   BMI 29.70 kg/m²   No LMP for male patient.     Physical Exam     Physical Exam  Vitals and nursing note reviewed.   Constitutional:       General: He is not in acute distress.     Appearance: Normal appearance. He is well-developed and normal weight. He is not ill-appearing, toxic-appearing or diaphoretic.   HENT:      Head: Normocephalic and atraumatic.      Right Ear: Tympanic membrane, ear canal and external ear normal. No drainage, swelling or tenderness. No middle ear effusion. There is no impacted cerumen. Tympanic membrane is not erythematous.      Left Ear: Tympanic membrane, ear canal and external ear normal. No drainage, swelling or tenderness.  No middle ear effusion. There is no impacted cerumen. Tympanic membrane is not erythematous.      Nose: Congestion and rhinorrhea present.      Right Sinus: Frontal sinus tenderness present. No maxillary sinus tenderness.      Left Sinus: Frontal sinus tenderness present. No maxillary sinus tenderness.      Mouth/Throat:      Mouth: Mucous membranes are moist. No oral lesions.      Pharynx: " Uvula midline. No pharyngeal swelling, oropharyngeal exudate, posterior oropharyngeal erythema or uvula swelling.      Tonsils: No tonsillar exudate or tonsillar abscesses.   Eyes:      General: No scleral icterus.        Right eye: No discharge.         Left eye: No discharge.      Conjunctiva/sclera: Conjunctivae normal.   Neck:      Thyroid: No thyromegaly.   Cardiovascular:      Rate and Rhythm: Normal rate and regular rhythm.      Heart sounds: Normal heart sounds. No murmur heard.     No friction rub. No gallop.   Pulmonary:      Effort: Pulmonary effort is normal. No respiratory distress.      Breath sounds: Normal breath sounds. No stridor. No wheezing, rhonchi or rales.   Chest:      Chest wall: No tenderness.   Musculoskeletal:         General: Normal range of motion.      Cervical back: Normal range of motion and neck supple.   Lymphadenopathy:      Cervical: No cervical adenopathy.   Skin:     General: Skin is warm and dry.      Capillary Refill: Capillary refill takes less than 2 seconds.   Neurological:      General: No focal deficit present.      Mental Status: He is alert and oriented to person, place, and time.   Psychiatric:         Mood and Affect: Mood normal.         Behavior: Behavior normal.

## (undated) DEVICE — SOLUTION BOWL: Brand: KENDALL

## (undated) DEVICE — SUT PDS II 1 CTX 36 IN Z371T

## (undated) DEVICE — HARMONIC 1100 SHEARS, 36CM SHAFT LENGTH: Brand: HARMONIC

## (undated) DEVICE — GAUZE SPONGES,16 PLY: Brand: CURITY

## (undated) DEVICE — ECHELON CIRCULAR POWERED STAPLER: Brand: ECHELON CIRCULAR

## (undated) DEVICE — INSUFLATION TUBING INSUFLOW (LEXION)

## (undated) DEVICE — DRAIN SPONGES,6 PLY: Brand: EXCILON

## (undated) DEVICE — PROXIMATE RELOADABLE LINEAR CUTTER WITH SAFETY LOCK-OUT, 75MM: Brand: PROXIMATE

## (undated) DEVICE — ABDOMINAL PAD: Brand: DERMACEA

## (undated) DEVICE — POOLE SUCTION HANDLE: Brand: CARDINAL HEALTH

## (undated) DEVICE — NEEDLE 20 G X 1 1/2

## (undated) DEVICE — 2, DISPOSABLE SUCTION/IRRIGATOR WITHOUT DISPOSABLE TIP: Brand: STRYKEFLOW

## (undated) DEVICE — SYRINGE 10ML LL

## (undated) DEVICE — INTENDED FOR TISSUE SEPARATION, AND OTHER PROCEDURES THAT REQUIRE A SHARP SURGICAL BLADE TO PUNCTURE OR CUT.: Brand: BARD-PARKER SAFETY BLADES SIZE 11, STERILE

## (undated) DEVICE — TUBING SUCTION 5MM X 12 FT

## (undated) DEVICE — TOWEL SURG XR DETECT GREEN STRL RFD

## (undated) DEVICE — TROCAR: Brand: KII FIOS FIRST ENTRY

## (undated) DEVICE — MEDI-VAC YANK SUCT HNDL W/TPRD BULBOUS TIP: Brand: CARDINAL HEALTH

## (undated) DEVICE — TRAY FOLEY 16FR URIMETER SURESTEP

## (undated) DEVICE — VIOLET BRAIDED (POLYGLACTIN 910), SYNTHETIC ABSORBABLE SUTURE: Brand: COATED VICRYL

## (undated) DEVICE — SUT MONOCRYL 4-0 PS-2 27 IN Y426H

## (undated) DEVICE — CHLORAPREP HI-LITE 26ML ORANGE

## (undated) DEVICE — 1820 FOAM BLOCK NEEDLE COUNTER: Brand: DEVON

## (undated) DEVICE — SUT VICRYL PLUS 0 54IN VCP287G

## (undated) DEVICE — GLOVE SRG BIOGEL ECLIPSE 7

## (undated) DEVICE — PACK PBDS STERILE LAP LITHOTOMY RF

## (undated) DEVICE — PLUMEPEN PRO 10FT

## (undated) DEVICE — SUT VICRYL PLUS 2-0 54IN VCP286G

## (undated) DEVICE — DECANTER: Brand: UNBRANDED

## (undated) DEVICE — UNDYED BRAIDED (POLYGLACTIN 910), SYNTHETIC ABSORBABLE SUTURE: Brand: COATED VICRYL

## (undated) DEVICE — EXOFIN PRECISION PEN HIGH VISCOSITY TOPICAL SKIN ADHESIVE: Brand: EXOFIN PRECISION PEN, 1G

## (undated) DEVICE — ACCESS PLATFORM FOR MINIMALLY INVASIVE SURGERY.: Brand: GELPORT® LAPAROSCOPIC  SYSTEM

## (undated) DEVICE — ENSEAL 20 CM SHAFT, LARGE JAW: Brand: ENSEAL X1

## (undated) DEVICE — DRESSING MEPILEX AG BORDER POST-OP 4 X 8 IN

## (undated) DEVICE — SUT PROLENE 2-0 KS 30 IN 8623H

## (undated) DEVICE — ECHELON CONTOUR W/ BLUE RELOAD: Brand: ECHELON

## (undated) DEVICE — BULB SYRINGE,IRRIGATION WITH PROTECTIVE CAP: Brand: DOVER

## (undated) DEVICE — TROCAR: Brand: KII® SLEEVE

## (undated) DEVICE — THE ECHELON FLEX POWERED PLUS ARTICULATING ENDOSCOPIC LINEAR CUTTERS ARE STERILE, SINGLE PATIENT USE INSTRUMENTS THAT SIMULTANEOUSLYCUT AND STAPLE TISSUE. THERE ARE SIX STAGGERED ROWS OF STAPLES, THREE ON EITHER SIDE OF THE CUT LINE. THE ECHELON FLEX 45 POWERED PLUSINSTRUMENTS HAVE A STAPLE LINE THAT IS APPROXIMATELY 45 MM LONG AND A CUT LINE THAT IS APPROXIMATELY 42 MM LONG. THE SHAFT CAN ROTATE FREELYIN BOTH DIRECTIONS AND AN ARTICULATION MECHANISM ENABLES THE DISTAL PORTION OF THE SHAFT TO PIVOT TO FACILITATE LATERAL ACCESS TO THE OPERATIVESITE.THE INSTRUMENTS ARE PACKAGED WITH A PRIMARY LITHIUM BATTERY PACK THAT MUST BE INSTALLED PRIOR TO USE. THERE ARE SPECIFIC REQUIREMENTS FORDISPOSING OF THE BATTERY PACK. REFER TO THE BATTERY PACK DISPOSAL SECTION.THE INSTRUMENTS ARE PACKAGED WITHOUT A RELOAD AND MUST BE LOADED PRIOR TO USE. A STAPLE RETAINING CAP ON THE RELOAD PROTECTS THE STAPLE LEGPOINTS DURING SHIPPING AND TRANSPORTATION. THE INSTRUMENTS’ LOCK-OUT FEATURE IS DESIGNED TO PREVENT A USED OR IMPROPERLY INSTALLED RELOADFROM BEING REFIRED OR AN INSTRUMENT FROM BEING FIRED WITHOUT A RELOAD.: Brand: ECHELON FLEX

## (undated) DEVICE — ANTI-FOG SOLUTION WITH FOAM PAD: Brand: DEVON